# Patient Record
Sex: FEMALE | Race: BLACK OR AFRICAN AMERICAN | Employment: FULL TIME | URBAN - METROPOLITAN AREA
[De-identification: names, ages, dates, MRNs, and addresses within clinical notes are randomized per-mention and may not be internally consistent; named-entity substitution may affect disease eponyms.]

---

## 2018-09-10 ENCOUNTER — OFFICE VISIT (OUTPATIENT)
Dept: FAMILY MEDICINE CLINIC | Age: 63
End: 2018-09-10
Payer: COMMERCIAL

## 2018-09-10 VITALS
HEIGHT: 66 IN | DIASTOLIC BLOOD PRESSURE: 75 MMHG | TEMPERATURE: 97.4 F | HEART RATE: 76 BPM | BODY MASS INDEX: 28.77 KG/M2 | SYSTOLIC BLOOD PRESSURE: 133 MMHG | WEIGHT: 179 LBS

## 2018-09-10 DIAGNOSIS — Z12.31 SCREENING MAMMOGRAM, ENCOUNTER FOR: ICD-10-CM

## 2018-09-10 DIAGNOSIS — Z00.00 HEALTHCARE MAINTENANCE: ICD-10-CM

## 2018-09-10 DIAGNOSIS — M79.671 ACUTE FOOT PAIN, RIGHT: Primary | ICD-10-CM

## 2018-09-10 LAB — HBA1C MFR BLD: 6 %

## 2018-09-10 PROCEDURE — 83036 HEMOGLOBIN GLYCOSYLATED A1C: CPT | Performed by: FAMILY MEDICINE

## 2018-09-10 PROCEDURE — 99213 OFFICE O/P EST LOW 20 MIN: CPT | Performed by: FAMILY MEDICINE

## 2018-09-10 ASSESSMENT — ENCOUNTER SYMPTOMS
CHEST TIGHTNESS: 0
SHORTNESS OF BREATH: 0
ABDOMINAL PAIN: 0
SORE THROAT: 0

## 2018-09-10 ASSESSMENT — PATIENT HEALTH QUESTIONNAIRE - PHQ9
SUM OF ALL RESPONSES TO PHQ9 QUESTIONS 1 & 2: 0
2. FEELING DOWN, DEPRESSED OR HOPELESS: 0
1. LITTLE INTEREST OR PLEASURE IN DOING THINGS: 0
SUM OF ALL RESPONSES TO PHQ QUESTIONS 1-9: 0
SUM OF ALL RESPONSES TO PHQ QUESTIONS 1-9: 0

## 2018-09-10 NOTE — PATIENT INSTRUCTIONS
Thank you for letting us take care of you today. We hope all your questions were addressed. If a question was overlooked or something else comes to mind after you return home, please contact a member of your Care Team listed below. Please make sure you have a routine office visit set up to follow-up on [address]. Your Care Team at [place] [address] is Team #2  Sandra Sanchez DO (Faculty)  Mitzi Wilkins MD (Resident)  Rose Dubois MD (Resident)  Cyn Carlisle MD (Resident)  Herbert Edmonds MD (Resident)  Lg Lyons MD (Resident)  FAITH Coleman., Dony Martino, [address] ([facility])  Sabrina Ricks RN, ([address] )  Mj Bright, Ph.D., (Behavioral Services)  [facility], [address] [place] [address] (Clinical Pharmacist)     Office phone number: [phone]    If you need to get in right away due to illness, please be advised we have \"Same Day\" appointments available Monday-Friday. Please call us at [phone] option #3 to schedule your \"Same Day\" appointment.

## 2018-09-15 ENCOUNTER — HOSPITAL ENCOUNTER (OUTPATIENT)
Dept: MAMMOGRAPHY | Age: 63
Discharge: HOME | End: 2018-09-17
Payer: COMMERCIAL

## 2018-09-15 DIAGNOSIS — Z12.31 SCREENING MAMMOGRAM, ENCOUNTER FOR: ICD-10-CM

## 2018-09-15 DIAGNOSIS — Z00.00 HEALTHCARE MAINTENANCE: ICD-10-CM

## 2018-09-15 PROCEDURE — 77067 SCR MAMMO BI INCL CAD: CPT

## 2018-09-20 ENCOUNTER — TELEPHONE (OUTPATIENT)
Dept: FAMILY MEDICINE CLINIC | Age: 63
End: 2018-09-20

## 2018-09-20 NOTE — TELEPHONE ENCOUNTER
----- Message from Steven Sidhu DO sent at 9/18/2018 11:27 AM EDT -----  Results reviewed are normal.    Please advise patient.

## 2019-01-28 ENCOUNTER — OFFICE VISIT (OUTPATIENT)
Dept: FAMILY MEDICINE CLINIC | Age: 64
End: 2019-01-28
Payer: COMMERCIAL

## 2019-01-28 VITALS
HEART RATE: 83 BPM | WEIGHT: 186.8 LBS | BODY MASS INDEX: 30.02 KG/M2 | TEMPERATURE: 97.4 F | DIASTOLIC BLOOD PRESSURE: 74 MMHG | SYSTOLIC BLOOD PRESSURE: 130 MMHG | HEIGHT: 66 IN

## 2019-01-28 DIAGNOSIS — M75.41 IMPINGEMENT SYNDROME OF RIGHT SHOULDER: Primary | ICD-10-CM

## 2019-01-28 DIAGNOSIS — R07.81 RIB PAIN ON RIGHT SIDE: ICD-10-CM

## 2019-01-28 PROCEDURE — 99213 OFFICE O/P EST LOW 20 MIN: CPT | Performed by: FAMILY MEDICINE

## 2019-01-28 RX ORDER — METHYLPREDNISOLONE 4 MG/1
TABLET ORAL
Qty: 1 KIT | Refills: 0 | Status: SHIPPED | OUTPATIENT
Start: 2019-01-28 | End: 2019-02-03

## 2019-01-28 ASSESSMENT — ENCOUNTER SYMPTOMS
CHEST TIGHTNESS: 0
COUGH: 0
ABDOMINAL PAIN: 0
SHORTNESS OF BREATH: 0

## 2019-01-30 ENCOUNTER — HOSPITAL ENCOUNTER (OUTPATIENT)
Age: 64
Discharge: HOME | End: 2019-02-01
Payer: COMMERCIAL

## 2019-01-30 ENCOUNTER — HOSPITAL ENCOUNTER (OUTPATIENT)
Dept: GENERAL RADIOLOGY | Age: 64
Discharge: HOME | End: 2019-02-01
Payer: COMMERCIAL

## 2019-01-30 DIAGNOSIS — R07.81 RIB PAIN ON RIGHT SIDE: ICD-10-CM

## 2019-01-30 PROCEDURE — 71101 X-RAY EXAM UNILAT RIBS/CHEST: CPT

## 2019-07-05 ENCOUNTER — OFFICE VISIT (OUTPATIENT)
Dept: PRIMARY CARE CLINIC | Age: 64
End: 2019-07-05
Payer: COMMERCIAL

## 2019-07-05 VITALS
DIASTOLIC BLOOD PRESSURE: 72 MMHG | WEIGHT: 187 LBS | SYSTOLIC BLOOD PRESSURE: 127 MMHG | HEART RATE: 67 BPM | TEMPERATURE: 98.2 F | BODY MASS INDEX: 30.05 KG/M2 | RESPIRATION RATE: 20 BRPM | OXYGEN SATURATION: 100 % | HEIGHT: 66 IN

## 2019-07-05 DIAGNOSIS — R23.8 PAPULE OF SKIN: Primary | ICD-10-CM

## 2019-07-05 PROCEDURE — 99202 OFFICE O/P NEW SF 15 MIN: CPT | Performed by: NURSE PRACTITIONER

## 2019-07-05 RX ORDER — AMOXICILLIN AND CLAVULANATE POTASSIUM 875; 125 MG/1; MG/1
1 TABLET, FILM COATED ORAL 2 TIMES DAILY
Qty: 14 TABLET | Refills: 0 | Status: SHIPPED | OUTPATIENT
Start: 2019-07-05 | End: 2019-07-12

## 2019-07-05 RX ORDER — GABAPENTIN 300 MG/1
CAPSULE ORAL
Refills: 0 | COMMUNITY
Start: 2019-05-15 | End: 2022-06-08 | Stop reason: SDUPTHER

## 2019-07-05 ASSESSMENT — ENCOUNTER SYMPTOMS
ABDOMINAL PAIN: 0
SINUS PAIN: 0
VOMITING: 0
COUGH: 0
DIARRHEA: 0
NAUSEA: 0
SORE THROAT: 0
SHORTNESS OF BREATH: 0

## 2019-07-05 ASSESSMENT — PATIENT HEALTH QUESTIONNAIRE - PHQ9
1. LITTLE INTEREST OR PLEASURE IN DOING THINGS: 0
SUM OF ALL RESPONSES TO PHQ QUESTIONS 1-9: 0
SUM OF ALL RESPONSES TO PHQ QUESTIONS 1-9: 0
SUM OF ALL RESPONSES TO PHQ9 QUESTIONS 1 & 2: 0
2. FEELING DOWN, DEPRESSED OR HOPELESS: 0

## 2019-07-16 ENCOUNTER — EMPLOYEE WELLNESS (OUTPATIENT)
Dept: OTHER | Age: 64
End: 2019-07-16

## 2019-07-16 LAB
CHOLESTEROL/HDL RATIO: 2.6
CHOLESTEROL: 206 MG/DL
GLUCOSE BLD-MCNC: 97 MG/DL (ref 70–99)
HDLC SERPL-MCNC: 80 MG/DL
LDL CHOLESTEROL: 111 MG/DL (ref 0–130)
PATIENT FASTING?: YES
TRIGL SERPL-MCNC: 74 MG/DL
VLDLC SERPL CALC-MCNC: ABNORMAL MG/DL (ref 1–30)

## 2019-07-22 VITALS — WEIGHT: 186 LBS | BODY MASS INDEX: 30.04 KG/M2

## 2019-08-08 ENCOUNTER — TELEPHONE (OUTPATIENT)
Dept: FAMILY MEDICINE CLINIC | Age: 64
End: 2019-08-08

## 2019-08-08 ENCOUNTER — OFFICE VISIT (OUTPATIENT)
Dept: PRIMARY CARE CLINIC | Age: 64
End: 2019-08-08
Payer: COMMERCIAL

## 2019-08-08 VITALS
TEMPERATURE: 98.4 F | WEIGHT: 190.4 LBS | RESPIRATION RATE: 20 BRPM | SYSTOLIC BLOOD PRESSURE: 135 MMHG | BODY MASS INDEX: 30.6 KG/M2 | OXYGEN SATURATION: 100 % | DIASTOLIC BLOOD PRESSURE: 73 MMHG | HEART RATE: 76 BPM | HEIGHT: 66 IN

## 2019-08-08 DIAGNOSIS — J01.90 ACUTE NON-RECURRENT SINUSITIS, UNSPECIFIED LOCATION: Primary | ICD-10-CM

## 2019-08-08 PROCEDURE — 99202 OFFICE O/P NEW SF 15 MIN: CPT | Performed by: INTERNAL MEDICINE

## 2019-08-08 RX ORDER — AZITHROMYCIN 250 MG/1
TABLET, FILM COATED ORAL
Qty: 1 PACKET | Refills: 0 | Status: SHIPPED | OUTPATIENT
Start: 2019-08-08 | End: 2020-07-06 | Stop reason: ALTCHOICE

## 2019-08-08 ASSESSMENT — ENCOUNTER SYMPTOMS
COUGH: 1
SORE THROAT: 1

## 2019-08-08 NOTE — PROGRESS NOTES
800 MG tablet Take 1 tablet by mouth every 8 hours as needed for Pain 90 tablet 2    ranitidine (ZANTAC) 300 MG capsule Take 300 mg by mouth every evening.  cetirizine (ZYRTEC) 10 MG tablet Take 10 mg by mouth daily.  Multiple Vitamin (MULTI-VITAMIN DAILY PO) Take  by mouth.  Mometasone Furoate (NASONEX NA) by Nasal route.  albuterol (PROVENTIL HFA;VENTOLIN HFA) 108 (90 BASE) MCG/ACT inhaler Inhale 2 puffs into the lungs every 6 hours as needed for Wheezing.  LUMIGAN 0.01 % SOLN Place 1 drop into both eyes nightly. No current facility-administered medications for this visit. Allergies   Allergen Reactions    Codeine     Eggs Or Egg-Derived Products [Eggs Or Egg-Derived Products] Diarrhea       Health Maintenance   Topic Date Due    Hepatitis C screen  1955    HIV screen  09/25/1970    DTaP/Tdap/Td vaccine (1 - Tdap) 09/25/1974    Shingles Vaccine (1 of 2) 09/25/2005    Cervical cancer screen  09/05/2015    Flu vaccine (1) 09/01/2019    A1C test (Diabetic or Prediabetic)  09/10/2019    Breast cancer screen  09/15/2019    Colon cancer screen colonoscopy  04/08/2023    Lipid screen  07/16/2024    Pneumococcal 0-64 years Vaccine  Aged Out       Controlled Substances Monitoring:      HPI:     Pharyngitis   This is a new problem. The current episode started yesterday. Associated symptoms include coughing (sputum) and a sore throat. Associated symptoms comments: Right ear worse than left. . The symptoms are aggravated by coughing. She has tried nothing for the symptoms. The treatment provided no relief. ROS:     Review of Systems   HENT: Positive for sore throat. Respiratory: Positive for cough (sputum). All other systems reviewed and are negative. Objective:     Physical Exam   Constitutional: She appears well-developed and well-nourished. HENT:   Head: Normocephalic and atraumatic.    Right Ear: External ear normal.   Left Ear: External ear normal. Mouth/Throat: Posterior oropharyngeal edema (PND drainage) present. Neurological: She is alert. Skin: Skin is warm and dry. Psychiatric: She has a normal mood and affect. Her behavior is normal.   Vitals reviewed.

## 2019-08-08 NOTE — PROGRESS NOTES
Visit Information    Have you changed or started any medications since your last visit including any over-the-counter medicines, vitamins, or herbal medicines? no   Have you stopped taking any of your medications? Is so, why? -  no  Are you having any side effects from any of your medications? - no    Have you seen any other physician or provider since your last visit?  no   Have you had any other diagnostic tests since your last visit?  no   Have you been seen in the emergency room and/or had an admission in a hospital since we last saw you?  no   Have you had your routine dental cleaning in the past 6 months?  no     Do you have an active MyChart account? If no, what is the barrier?   Yes    Patient Care Team:  Taylor Durand,  as PCP - [address] [place] [address],  as PCP - [facility] Provider    Medical History Review  Past Medical, Family, and Social History reviewed and does not contribute to the patient presenting condition    Health Maintenance   Topic Date Due    Hepatitis C screen  1955    HIV screen  09/25/1970    DTaP/Tdap/Td vaccine (1 - Tdap) 09/25/1974    Shingles Vaccine (1 of 2) 09/25/2005    Cervical cancer screen  09/05/2015    Flu vaccine (1) 09/01/2019    A1C test (Diabetic or Prediabetic)  09/10/2019    Breast cancer screen  09/15/2019    Colon cancer screen colonoscopy  04/08/2023    Lipid screen  07/16/2024    Pneumococcal 0-64 years Vaccine  Aged Out

## 2020-03-27 ENCOUNTER — OFFICE VISIT (OUTPATIENT)
Dept: FAMILY MEDICINE CLINIC | Age: 65
End: 2020-03-27
Payer: COMMERCIAL

## 2020-03-27 VITALS
DIASTOLIC BLOOD PRESSURE: 77 MMHG | WEIGHT: 186.2 LBS | BODY MASS INDEX: 29.92 KG/M2 | TEMPERATURE: 98.9 F | HEIGHT: 66 IN | HEART RATE: 80 BPM | SYSTOLIC BLOOD PRESSURE: 123 MMHG

## 2020-03-27 PROCEDURE — 99213 OFFICE O/P EST LOW 20 MIN: CPT | Performed by: FAMILY MEDICINE

## 2020-03-27 ASSESSMENT — ENCOUNTER SYMPTOMS
SHORTNESS OF BREATH: 0
SINUS PRESSURE: 1
COUGH: 0
ABDOMINAL PAIN: 0

## 2020-03-27 ASSESSMENT — PATIENT HEALTH QUESTIONNAIRE - PHQ9
SUM OF ALL RESPONSES TO PHQ QUESTIONS 1-9: 0
2. FEELING DOWN, DEPRESSED OR HOPELESS: 0
SUM OF ALL RESPONSES TO PHQ QUESTIONS 1-9: 0
1. LITTLE INTEREST OR PLEASURE IN DOING THINGS: 0
SUM OF ALL RESPONSES TO PHQ9 QUESTIONS 1 & 2: 0

## 2020-03-27 NOTE — PROGRESS NOTES
through 04-. RTW 04-. Rib study - right lower chest wall - patient high fear factor at this time. Isaac 0-2 weeks if not better.         Renay Mcintosh, DO

## 2020-03-27 NOTE — PATIENT INSTRUCTIONS
Thank you for letting us take care of you today. We hope all your questions were addressed. If a question was overlooked or something else comes to mind after you return home, please contact a member of your Care Team listed below. Please make sure you have a routine office visit set up to follow-up on [address]. Your Care Team at [place] [address] is Team #2  Sheryl Urena DO (Faculty)  Kimberly Live MD (Faculty)  Shay Roberto MD (Resident)  Radha Beach MD (Resident)  Paz Roberts MD (Resident)  Nurys Ray MD (Resident)  Brianna Martell, FAITH Grullon ,DARELL GARCIA, [facility] office)  [facility])  Chenchobentley Jitendra ([facility])  Neema Bean, [facility] ([address] [facility])  Simón Man [facility] (Clinical Pharmacist)     Office phone number: [phone]    If you need to get in right away due to illness, please be advised we have \"Same Day\" appointments available Monday-Friday. Please call us at [phone] option #3 to schedule your \"Same Day\" appointment.

## 2020-03-27 NOTE — LETTER
[facility]  [facility] [address]  Phone: [phone]  Fax: [phone]    Amy Rogers        March 27, 2020     Patient: Francoise Liang   YOB: 1955   Date of Visit: 3/27/2020       To Whom It May Concern: It is my medical opinion that Kassandra Braxton should remain out of work until 04-. If you have any questions or concerns, please don't hesitate to call.     Sincerely,        Braxton Gaming, DO

## 2020-03-30 ENCOUNTER — HOSPITAL ENCOUNTER (OUTPATIENT)
Dept: GENERAL RADIOLOGY | Age: 65
Discharge: HOME | End: 2020-04-01
Payer: COMMERCIAL

## 2020-03-30 ENCOUNTER — HOSPITAL ENCOUNTER (OUTPATIENT)
Age: 65
Discharge: HOME | End: 2020-04-01
Payer: COMMERCIAL

## 2020-03-30 PROCEDURE — 71101 X-RAY EXAM UNILAT RIBS/CHEST: CPT

## 2020-03-31 ENCOUNTER — TELEPHONE (OUTPATIENT)
Dept: FAMILY MEDICINE CLINIC | Age: 65
End: 2020-03-31

## 2020-03-31 NOTE — TELEPHONE ENCOUNTER
----- Message from Ramesh Button, DO sent at 3/30/2020  3:44 PM EDT -----  Results reviewed are normal.    Please advise patient.

## 2020-04-06 ENCOUNTER — E-VISIT (OUTPATIENT)
Dept: FAMILY MEDICINE CLINIC | Age: 65
End: 2020-04-06

## 2020-04-06 ENCOUNTER — TELEPHONE (OUTPATIENT)
Dept: FAMILY MEDICINE CLINIC | Age: 65
End: 2020-04-06

## 2020-04-29 ENCOUNTER — TELEPHONE (OUTPATIENT)
Dept: FAMILY MEDICINE CLINIC | Age: 65
End: 2020-04-29

## 2020-05-01 ENCOUNTER — HOSPITAL ENCOUNTER (OUTPATIENT)
Age: 65
Discharge: HOME | End: 2020-05-01
Payer: COMMERCIAL

## 2020-05-01 LAB
BILIRUBIN URINE: NEGATIVE
COLOR: YELLOW
COMMENT UA: NORMAL
GLUCOSE URINE: NEGATIVE
KETONES, URINE: NEGATIVE
LEUKOCYTE ESTERASE, URINE: NEGATIVE
NITRITE, URINE: NEGATIVE
PH UA: 7.5 (ref 5–8)
PROTEIN UA: NEGATIVE
SPECIFIC GRAVITY UA: 1.01 (ref 1–1.03)
TURBIDITY: CLEAR
URINE HGB: NEGATIVE
UROBILINOGEN, URINE: NORMAL

## 2020-05-01 PROCEDURE — 81003 URINALYSIS AUTO W/O SCOPE: CPT

## 2020-05-12 ENCOUNTER — OFFICE VISIT (OUTPATIENT)
Dept: OBGYN CLINIC | Age: 65
End: 2020-05-12
Payer: COMMERCIAL

## 2020-05-12 ENCOUNTER — HOSPITAL ENCOUNTER (OUTPATIENT)
Age: 65
Setting detail: SPECIMEN
Discharge: HOME | End: 2020-05-12
Payer: COMMERCIAL

## 2020-05-12 VITALS
TEMPERATURE: 98.1 F | BODY MASS INDEX: 31.41 KG/M2 | DIASTOLIC BLOOD PRESSURE: 78 MMHG | HEART RATE: 72 BPM | WEIGHT: 184 LBS | HEIGHT: 64 IN | SYSTOLIC BLOOD PRESSURE: 122 MMHG

## 2020-05-12 PROCEDURE — 99386 PREV VISIT NEW AGE 40-64: CPT | Performed by: OBSTETRICS & GYNECOLOGY

## 2020-05-12 PROCEDURE — G0145 SCR C/V CYTO,THINLAYER,RESCR: HCPCS

## 2020-05-12 PROCEDURE — 87624 HPV HI-RISK TYP POOLED RSLT: CPT

## 2020-05-12 NOTE — PROGRESS NOTES
Discharge  Musculoskeletal ROS: No Arthralgia, Arthritis,Gout,Osteoporosis or Rheumatism  Neurological ROS: No CVA, Migraines, Epilepsy, Seizure Hx, or Limb Weakness  Dermatological ROS: No Rash, Itching, Hives, Mole Changes or Cancer                                                                                                                                                                                                                                  PHYSICAL Exam:     Constitutional:  Vitals:    05/12/20 1116   BP: 122/78   Site: Right Upper Arm   Position: Sitting   Cuff Size: Medium Adult   Pulse: 72   Temp: 98.1 °F (36.7 °C)   Weight: 184 lb (83.5 kg)   Height: 5' 4\" (1.626 m)         General Appearance: This  is a well Developed, well Nourished, well groomed female. Her BMI was reviewed. Nutritional decision making was discussed. Skin:  There was a Normal Inspection of the skin without rashes or lesions. There were no rashes. (Papular, Maculopapular, Hives, Pustular, Macular)     There were no lesions (Ulcers, Erythema, Abn. Appearing Nevi)            Lymphatic:  No Lymph Nodes were Palpable in the neck , axilla or groin. # Of Lymph Nodes; Location ; Character [Normal]  [Shotty] [Tender] [Enlarged]     Neck and EENT:  The neck was supple. There were no masses   The thyroid was not enlarged and had no masses. Perrla, EOMI B/L, TMI B/L No Abnormalities. Throat inspected-No exudates or Masses, Nares Patent No Masses        Respiratory: The lungs were auscultated and found to be clear. There were no rales, rhonchi or wheezes. There was a good respiratory effort. Cardiovascular: The heart was in a regular rate and rhythm. . No S3 or S4. There was no murmur appreciated. Location, grade, and radiation are not applicable. Extremities: The patients extremities were without calf tenderness, edema, or varicosities. There was full range of motion in all four extremities.  Pulses in all four extremities were appreciated and are 2/4. Abdomen: The abdomen was soft and non-tender. There were good bowel sounds in all quadrants and there was no guarding, rebound or rigidity. On evaluation there was no evidence of hepatosplenomegaly and there was no costal vertebral shavon tenderness bilaterally. No hernias were appreciated. Abdominal Scars: midline c/w prior surgery    Psych: The patient had a normal Orientation to: Time, Place, Person, and Situation  There is no Mood / Affect changes    Breast:  (Chest)  normal appearance, no masses or tenderness. Tenderness 10 oclock  Self breast exams were reviewed in detail. Literature was given. Pelvic Exam:  Vulva and vagina appear normal. Bimanual exam reveals normal cuff without masses. Non-tender to palpation    Rectal Exam:  exam declined by patient          Musculosk:  Normal Gait and station was noted. Digits were evaluated without abnormal findings. Range of motion, stability and strength were evaluated and found to be appropriate for the patients age. OMM Structural Component:  The patient did not complain of a Chief complaint requiring OMM. Chief Complaint:none    Structural Exam: Agreeable      Persistent right sided pain similar to when she had her mass removed. Right groin tender to deep palpation            ASSESSMENT:      59 y.o. Annual   Diagnosis Orders   1. Encounter for screening mammogram for malignant neoplasm of breast  [facility] DIGITAL SCREEN W CAD BILATERAL   2. Family history of breast cancer in first degree relative  [facility] DIGITAL SCREEN W CAD BILATERAL   3. Pelvic pain in female  US Pelvis Complete   4. Well female exam with routine gynecological exam  PAP SMEAR   5.  Screening for HPV (human papillomavirus)  PAP SMEAR          Chief Complaint   Patient presents with   Adela Alfredo Doctor    Annual Exam          Past Medical History:   Diagnosis Date    Diverticulosis 2009    Diverticulosis     Fibroids     Glaucoma

## 2020-05-14 LAB
HPV SAMPLE: NORMAL
HPV, GENOTYPE 16: NOT DETECTED
HPV, GENOTYPE 18: NOT DETECTED
HPV, HIGH RISK OTHER: NOT DETECTED
HPV, INTERPRETATION: NORMAL
SPECIMEN DESCRIPTION: NORMAL

## 2020-05-15 LAB — CYTOLOGY REPORT: NORMAL

## 2020-05-20 ENCOUNTER — OFFICE VISIT (OUTPATIENT)
Dept: OBGYN CLINIC | Age: 65
End: 2020-05-20
Payer: COMMERCIAL

## 2020-05-20 PROCEDURE — 76830 TRANSVAGINAL US NON-OB: CPT | Performed by: OBSTETRICS & GYNECOLOGY

## 2020-05-28 ENCOUNTER — TELEPHONE (OUTPATIENT)
Dept: FAMILY MEDICINE CLINIC | Age: 65
End: 2020-05-28

## 2020-05-28 NOTE — TELEPHONE ENCOUNTER
Patient called because she was seen in March for side pain. She states at the time she was told if the pain and urine frequency doesn't go away to call back. She is still having the same issues. She also has questions about foot nerve block.

## 2020-06-03 ENCOUNTER — VIRTUAL VISIT (OUTPATIENT)
Dept: FAMILY MEDICINE CLINIC | Age: 65
End: 2020-06-03
Payer: COMMERCIAL

## 2020-06-03 VITALS — WEIGHT: 183 LBS | HEIGHT: 64 IN | BODY MASS INDEX: 31.24 KG/M2

## 2020-06-03 PROCEDURE — 99213 OFFICE O/P EST LOW 20 MIN: CPT | Performed by: FAMILY MEDICINE

## 2020-06-03 ASSESSMENT — PATIENT HEALTH QUESTIONNAIRE - PHQ9
SUM OF ALL RESPONSES TO PHQ QUESTIONS 1-9: 0
SUM OF ALL RESPONSES TO PHQ9 QUESTIONS 1 & 2: 0
1. LITTLE INTEREST OR PLEASURE IN DOING THINGS: 0
SUM OF ALL RESPONSES TO PHQ QUESTIONS 1-9: 0
2. FEELING DOWN, DEPRESSED OR HOPELESS: 0

## 2020-06-09 ENCOUNTER — HOSPITAL ENCOUNTER (OUTPATIENT)
Dept: WOMENS IMAGING | Age: 65
Discharge: HOME | End: 2020-06-11
Payer: COMMERCIAL

## 2020-06-09 PROCEDURE — 77063 BREAST TOMOSYNTHESIS BI: CPT

## 2020-06-11 ENCOUNTER — TELEPHONE (OUTPATIENT)
Dept: FAMILY MEDICINE CLINIC | Age: 65
End: 2020-06-11

## 2020-06-13 ENCOUNTER — APPOINTMENT (OUTPATIENT)
Dept: CT IMAGING | Age: 65
End: 2020-06-13
Payer: COMMERCIAL

## 2020-06-13 ENCOUNTER — HOSPITAL ENCOUNTER (OUTPATIENT)
Dept: CT IMAGING | Age: 65
Discharge: HOME | End: 2020-06-15
Payer: COMMERCIAL

## 2020-06-13 PROCEDURE — 74176 CT ABD & PELVIS W/O CONTRAST: CPT

## 2020-06-15 ENCOUNTER — TELEPHONE (OUTPATIENT)
Dept: OBGYN CLINIC | Age: 65
End: 2020-06-15

## 2020-06-17 ENCOUNTER — TELEPHONE (OUTPATIENT)
Dept: FAMILY MEDICINE CLINIC | Age: 65
End: 2020-06-17

## 2020-06-18 ENCOUNTER — TELEPHONE (OUTPATIENT)
Dept: FAMILY MEDICINE CLINIC | Age: 65
End: 2020-06-18

## 2020-06-18 ENCOUNTER — HOSPITAL ENCOUNTER (EMERGENCY)
Age: 65
Discharge: HOME | End: 2020-06-18
Attending: EMERGENCY MEDICINE
Payer: COMMERCIAL

## 2020-06-18 VITALS
SYSTOLIC BLOOD PRESSURE: 148 MMHG | RESPIRATION RATE: 18 BRPM | OXYGEN SATURATION: 99 % | HEART RATE: 77 BPM | TEMPERATURE: 98.2 F | DIASTOLIC BLOOD PRESSURE: 89 MMHG

## 2020-06-18 PROCEDURE — 6370000000 HC RX 637 (ALT 250 FOR IP): Performed by: STUDENT IN AN ORGANIZED HEALTH CARE EDUCATION/TRAINING PROGRAM

## 2020-06-18 PROCEDURE — 99284 EMERGENCY DEPT VISIT MOD MDM: CPT

## 2020-06-18 PROCEDURE — 93971 EXTREMITY STUDY: CPT

## 2020-06-18 RX ORDER — KETOROLAC TROMETHAMINE 10 MG/1
10 TABLET, FILM COATED ORAL EVERY 6 HOURS PRN
Qty: 20 TABLET | Refills: 0 | Status: SHIPPED | OUTPATIENT
Start: 2020-06-18 | End: 2020-07-06

## 2020-06-18 RX ORDER — IBUPROFEN 400 MG/1
400 TABLET ORAL ONCE
Status: COMPLETED | OUTPATIENT
Start: 2020-06-18 | End: 2020-06-18

## 2020-06-18 RX ADMIN — IBUPROFEN 400 MG: 400 TABLET, FILM COATED ORAL at 18:32

## 2020-06-18 ASSESSMENT — PAIN SCALES - GENERAL
PAINLEVEL_OUTOF10: 5
PAINLEVEL_OUTOF10: 5

## 2020-06-18 ASSESSMENT — PAIN DESCRIPTION - LOCATION: LOCATION: GROIN

## 2020-06-18 ASSESSMENT — ENCOUNTER SYMPTOMS
ABDOMINAL PAIN: 0
VOMITING: 0
COUGH: 0
BACK PAIN: 0
NAUSEA: 0
RHINORRHEA: 0
SHORTNESS OF BREATH: 0

## 2020-06-18 ASSESSMENT — PAIN DESCRIPTION - PAIN TYPE: TYPE: ACUTE PAIN;CHRONIC PAIN

## 2020-06-18 ASSESSMENT — PAIN DESCRIPTION - ORIENTATION: ORIENTATION: RIGHT

## 2020-06-18 NOTE — ED NOTES
Pt resting on stretcher. NAD noted. RR even and nonlabored. Call light within reach. Will continue to monitor.        Devan Stein RN  06/18/20 1925

## 2020-06-18 NOTE — ED PROVIDER NOTES
MEDICATIONS:  Discharge Medication List as of 6/18/2020  8:30 PM      START taking these medications    Details   ketorolac (TORADOL) 10 MG tablet Take 1 tablet by mouth every 6 hours as needed for Pain, Disp-20 tablet, R-0Print             Irvin Finnegan MD  Emergency Medicine Resident    (Please note that portions of this note were completed with a voicerecognition program.  Efforts were made to edit the dictations but occasionally words are mis-transcribed.)        Irvin Finnegan MD  06/19/20 Deb Galaviz MD  06/19/20 0008

## 2020-06-18 NOTE — TELEPHONE ENCOUNTER
Patient called again, wanting to know if she should be seen at ED. Complains of being in so much pain and not being able to hardly walk.

## 2020-06-18 NOTE — TELEPHONE ENCOUNTER
----- Message from Sana Walter DO sent at 6/18/2020  3:01 PM EDT -----  Please call [facility] - advise her that the CT findings don't show a kidney stone or any acute reason for her pain. If she still has symptoms I would like an office visit with her - it is still unclear where the pain is coming from.     Kj Graham, '

## 2020-06-18 NOTE — ED NOTES
Pt sent to ED by PCP for abnormal CT. Pt stated she has calcified blood clot in right leg. Pt stated she has had groin pain on right leg for a few months. Pt reports pain started after having surgery on right foot. Pt stated pain is increased with movement. Pt able to walk on leg but has pain. Pt has pain 5/10 at this time. Pt has take ibuprofen and tylenol for pain. Pt denies any numbness or tingling in leg.           Alfredo Varner RN  06/18/20 2027

## 2020-06-22 ENCOUNTER — OFFICE VISIT (OUTPATIENT)
Dept: FAMILY MEDICINE CLINIC | Age: 65
End: 2020-06-22
Payer: COMMERCIAL

## 2020-06-22 VITALS
BODY MASS INDEX: 30.71 KG/M2 | WEIGHT: 179 LBS | DIASTOLIC BLOOD PRESSURE: 69 MMHG | HEART RATE: 81 BPM | TEMPERATURE: 98.3 F | SYSTOLIC BLOOD PRESSURE: 117 MMHG

## 2020-06-22 PROBLEM — N39.41 URGE INCONTINENCE: Status: ACTIVE | Noted: 2020-06-22

## 2020-06-22 PROBLEM — R35.0 FREQUENCY OF MICTURITION: Status: ACTIVE | Noted: 2020-06-22

## 2020-06-22 PROBLEM — R31.29 MICROHEMATURIA: Status: ACTIVE | Noted: 2020-06-22

## 2020-06-22 PROBLEM — R35.1 NOCTURIA: Status: ACTIVE | Noted: 2020-06-22

## 2020-06-22 PROCEDURE — 99213 OFFICE O/P EST LOW 20 MIN: CPT | Performed by: STUDENT IN AN ORGANIZED HEALTH CARE EDUCATION/TRAINING PROGRAM

## 2020-06-22 ASSESSMENT — PATIENT HEALTH QUESTIONNAIRE - PHQ9
SUM OF ALL RESPONSES TO PHQ9 QUESTIONS 1 & 2: 0
1. LITTLE INTEREST OR PLEASURE IN DOING THINGS: 0
SUM OF ALL RESPONSES TO PHQ QUESTIONS 1-9: 0
2. FEELING DOWN, DEPRESSED OR HOPELESS: 0
SUM OF ALL RESPONSES TO PHQ QUESTIONS 1-9: 0

## 2020-06-22 ASSESSMENT — ENCOUNTER SYMPTOMS
VOMITING: 0
COUGH: 0
DIARRHEA: 0
CHEST TIGHTNESS: 0
SHORTNESS OF BREATH: 0
ABDOMINAL PAIN: 0
CONSTIPATION: 0
SORE THROAT: 0
NAUSEA: 0

## 2020-06-22 NOTE — PROGRESS NOTES
Visit Information    Have you changed or started any medications since your last visit including any over-the-counter medicines, vitamins, or herbal medicines? no   Have you stopped taking any of your medications? Is so, why? -  no  Are you having any side effects from any of your medications? - no    Have you seen any other physician or provider since your last visit?  no   Have you had any other diagnostic tests since your last visit?  no   Have you been seen in the emergency room and/or had an admission in a hospital since we last saw you?  no   Have you had your routine dental cleaning in the past 6 months?  no     Do you have an active MyChart account? If no, what is the barrier?   No:     Patient Care Team:  Bhavya Reynoso DO as PCP - [address] [place] [address],  as PCP - [facility] Provider    Medical History Review  Past Medical, Family, and Social History reviewed and does not contribute to the patient presenting condition    Health Maintenance   Topic Date Due    Hepatitis C screen  1955    HIV screen  09/25/1970    DTaP/Tdap/Td vaccine (1 - Tdap) 09/25/1974    Shingles Vaccine (1 of 2) 09/25/2005    A1C test (Diabetic or Prediabetic)  09/10/2019    Flu vaccine (Season Ended) 09/01/2020    Breast cancer screen  06/09/2021    Colon cancer screen colonoscopy  04/08/2023    Lipid screen  07/16/2024    Hepatitis A vaccine  Aged Out    Hepatitis B vaccine  Aged Out    Hib vaccine  Aged Out    Meningococcal (ACWY) vaccine  Aged Out    Pneumococcal 0-64 years Vaccine  Aged Out

## 2020-06-22 NOTE — PROGRESS NOTES
Subjective:    Meyer Claude is a 59 y.o. female with  has a past medical history of Constipation, Diverticulitis, Diverticulosis, Diverticulosis, Fibroids, GERD (gastroesophageal reflux disease), Glaucoma, Hay fever, Hiatal hernia, Irritable bowel disease, Kidney infection, Lumbar stenosis, and MVP (mitral valve prolapse). Family History   Problem Relation Age of Onset    Breast Cancer Sister     Diabetes Sister     Diabetes Mother     Hypertension Mother     Tuberculosis Mother     Obesity Mother     Heart Disease Father     Arthritis Father     Diabetes Sister     Diabetes Sister     Colon Cancer Maternal Grandmother     Stroke Other     Kidney Disease Other        Presented tothe office today for:  Chief Complaint   Patient presents with    Groin Pain     patient states she is still having pain in her right side down to her groin area       HPI    Patient presents today complaining of right groin pain x1 year. Patient states that she had surgery to her foot and developed right groin pain and later progressing to intense pain requiring CT abdomen which was remarkable for left phlebolith. Patient denies having any shortness of breath or coughing up blood. Review of Systems   Constitutional: Negative for chills, fatigue, fever and unexpected weight change. HENT: Negative for congestion, mouth sores and sore throat. Eyes: Negative for visual disturbance. Respiratory: Negative for cough, chest tightness and shortness of breath. Cardiovascular: Negative for chest pain and leg swelling. Gastrointestinal: Negative for abdominal pain, constipation, diarrhea, nausea and vomiting. Genitourinary: Negative for difficulty urinating. Musculoskeletal: Negative for joint swelling. Right groin pain   Skin: Negative for rash. Neurological: Negative for dizziness, weakness and headaches.        Objective:    /69 (Site: Left Upper Arm, Position: Sitting, Cuff Size: Large

## 2020-06-22 NOTE — PROGRESS NOTES
I have reviewed and discussed key elements of [address] [place] [address] with the resident including plan of care and follow up and agree with the care autumn plan. Patient with groin discomfort. Imaging reveals phlebolith, inguinal. Will refer to vascular. BP elevated. Will recheck at  3 month  follow up.

## 2020-06-24 ENCOUNTER — TELEPHONE (OUTPATIENT)
Dept: FAMILY MEDICINE CLINIC | Age: 65
End: 2020-06-24

## 2020-06-24 NOTE — TELEPHONE ENCOUNTER
Received a call from Vascular in regards to the referral placed for patient. Was told referral needs to be just for vascular not Neuro-Endovascular. On the new referral, could you put the referral to Dr. Emery Anderson @ [facility]. Patient has appointment scheduled there on 08/03/2020, but referral needs fixed.

## 2020-07-01 NOTE — TELEPHONE ENCOUNTER
Please do a referral to vascular for andrew downing.   Electronically signed by Tommie Sanchez MD on 7/1/2020 at 3:45 PM

## 2020-07-16 ENCOUNTER — TELEPHONE (OUTPATIENT)
Dept: FAMILY MEDICINE CLINIC | Age: 65
End: 2020-07-16

## 2020-07-20 ENCOUNTER — TELEPHONE (OUTPATIENT)
Dept: FAMILY MEDICINE CLINIC | Age: 65
End: 2020-07-20

## 2020-07-21 ENCOUNTER — OFFICE VISIT (OUTPATIENT)
Dept: FAMILY MEDICINE CLINIC | Age: 65
End: 2020-07-21
Payer: COMMERCIAL

## 2020-07-21 VITALS
WEIGHT: 172 LBS | SYSTOLIC BLOOD PRESSURE: 132 MMHG | HEART RATE: 76 BPM | HEIGHT: 64 IN | TEMPERATURE: 97 F | BODY MASS INDEX: 29.37 KG/M2 | DIASTOLIC BLOOD PRESSURE: 77 MMHG

## 2020-07-21 PROCEDURE — 99213 OFFICE O/P EST LOW 20 MIN: CPT | Performed by: STUDENT IN AN ORGANIZED HEALTH CARE EDUCATION/TRAINING PROGRAM

## 2020-07-21 RX ORDER — CYCLOBENZAPRINE HCL 10 MG
10 TABLET ORAL NIGHTLY PRN
Qty: 20 TABLET | Refills: 0 | Status: SHIPPED | OUTPATIENT
Start: 2020-07-21 | End: 2020-08-10

## 2020-07-21 RX ORDER — KETOROLAC TROMETHAMINE 10 MG/1
10 TABLET, FILM COATED ORAL EVERY 6 HOURS PRN
Qty: 20 TABLET | Refills: 0 | Status: SHIPPED | OUTPATIENT
Start: 2020-07-21 | End: 2021-02-05

## 2020-07-21 ASSESSMENT — ENCOUNTER SYMPTOMS
SHORTNESS OF BREATH: 0
WHEEZING: 0
CHEST TIGHTNESS: 0
CHOKING: 0
BACK PAIN: 1
GASTROINTESTINAL NEGATIVE: 1

## 2020-07-21 NOTE — PROGRESS NOTES
Visit Information    Have you changed or started any medications since your last visit including any over-the-counter medicines, vitamins, or herbal medicines? no   Have you stopped taking any of your medications? Is so, why? -  no  Are you having any side effects from any of your medications? - no    Have you seen any other physician or provider since your last visit?  no   Have you had any other diagnostic tests since your last visit? yes - [facility]   Have you been seen in the emergency room and/or had an admission in a hospital since we last saw you?  yes - [facility]   Have you had your routine dental cleaning in the past 6 months?  no     Do you have an active MyChart account? If no, what is the barrier?   Yes    Patient Care Team:  Ana García,  as PCP - [address] [place] [address],  as PCP - [facility] Provider    Medical History Review  Past Medical, Family, and Social History reviewed and does not contribute to the patient presenting condition    Health Maintenance   Topic Date Due    Hepatitis C screen  1955    HIV screen  09/25/1970    DTaP/Tdap/Td vaccine (1 - Tdap) 09/25/1974    Shingles Vaccine (1 of 2) 09/25/2005    A1C test (Diabetic or Prediabetic)  09/10/2019    Flu vaccine (1) 09/01/2020    Breast cancer screen  06/09/2021    Colon cancer screen colonoscopy  04/08/2023    Lipid screen  07/16/2024    Hepatitis A vaccine  Aged Out    Hepatitis B vaccine  Aged Out    Hib vaccine  Aged Out    Meningococcal (ACWY) vaccine  Aged Out    Pneumococcal 0-64 years Vaccine  Aged Out

## 2020-07-21 NOTE — PROGRESS NOTES
I have reviewed and discussed key elements of [address] [place] [address] with the resident including plan of care and follow up and agree with the care autumn plan.
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
## 2020-07-21 NOTE — LETTER
[facility]  [address]  [address] [place] [address]  Phone: [phone]  Fax: [phone]    Mt Marte MD        July 21, 2020     Patient: Néstor Soares   YOB: 1955   Date of Visit: 7/21/2020       To Whom it May Concern:    Florencia Fernando was seen in my clinic on 7/21/2020. Please excuse her from work for 2 weeks due to underlying condition. She may return to work on  August 5, 2020. If you have any questions or concerns, please don't hesitate to call.     Sincerely,         Mt Marte MD

## 2020-07-30 ENCOUNTER — OFFICE VISIT (OUTPATIENT)
Dept: FAMILY MEDICINE CLINIC | Age: 65
End: 2020-07-30
Payer: COMMERCIAL

## 2020-07-30 VITALS
TEMPERATURE: 97.5 F | BODY MASS INDEX: 29.53 KG/M2 | SYSTOLIC BLOOD PRESSURE: 128 MMHG | DIASTOLIC BLOOD PRESSURE: 70 MMHG | WEIGHT: 173 LBS | HEIGHT: 64 IN | HEART RATE: 68 BPM

## 2020-07-30 PROCEDURE — 99213 OFFICE O/P EST LOW 20 MIN: CPT | Performed by: FAMILY MEDICINE

## 2020-07-30 PROCEDURE — 99211 OFF/OP EST MAY X REQ PHY/QHP: CPT | Performed by: FAMILY MEDICINE

## 2020-07-30 NOTE — PATIENT INSTRUCTIONS
Thank you for letting us take care of you today. We hope all your questions were addressed. If a question was overlooked or something else comes to mind after you return home, please contact a member of your Care Team listed below. Your Care Team at [place] [address] is Team #2  Adan Navarro DO (Faculty)  Joshua Fitzgerald (Faculty)  Arabella Barreto MD (Resident)  Lam Morrison MD (Resident)  Jitendra Mar MD (Resident)  Adan Zambrano MD (Resident)  Rosio Yeung MD (Resident)  FAITH Panchal. ,MISA GARCIA [facility] ([address] office)  Pat Degroot, [address] [facility] [address] (Clinical Practice Manager)  Timothy Lyle [facility] (Clinical Pharmacist)     Office phone number: [phone]    If you need to get in right away due to illness, please be advised we have \"Same Day\" appointments available Monday-Friday. Please call us at [phone] option #3 to schedule your \"Same Day\" appointment.
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
## 2020-07-30 NOTE — PROGRESS NOTES
Visit Information    Have you changed or started any medications since your last visit including any over-the-counter medicines, vitamins, or herbal medicines? no   Have you stopped taking any of your medications? Is so, why? -  no  Are you having any side effects from any of your medications? - no    Have you seen any other physician or provider since your last visit?  no   Have you had any other diagnostic tests since your last visit?  no   Have you been seen in the emergency room and/or had an admission in a hospital since we last saw you?  no   Have you had your routine dental cleaning in the past 6 months?  no     Do you have an active MyChart account? If no, what is the barrier?   Yes    Patient Care Team:  Nikita Islas, DO as PCP - [address] [place] [address], DO as PCP - [facility] Provider    Medical History Review  Past Medical, Family, and Social History reviewed and does not contribute to the patient presenting condition    Health Maintenance   Topic Date Due    Hepatitis C screen  1955    HIV screen  09/25/1970    DTaP/Tdap/Td vaccine (1 - Tdap) 09/25/1974    Shingles Vaccine (1 of 2) 09/25/2005    A1C test (Diabetic or Prediabetic)  09/10/2019    Flu vaccine (1) 09/01/2020    Breast cancer screen  06/09/2021    Colon cancer screen colonoscopy  04/08/2023    Lipid screen  07/16/2024    Hepatitis A vaccine  Aged Out    Hepatitis B vaccine  Aged Out    Hib vaccine  Aged Out    Meningococcal (ACWY) vaccine  Aged Out    Pneumococcal 0-64 years Vaccine  Aged Out

## 2020-08-03 ENCOUNTER — INITIAL CONSULT (OUTPATIENT)
Dept: VASCULAR SURGERY | Age: 65
End: 2020-08-03
Payer: COMMERCIAL

## 2020-08-03 VITALS
HEART RATE: 69 BPM | BODY MASS INDEX: 29.19 KG/M2 | WEIGHT: 171 LBS | SYSTOLIC BLOOD PRESSURE: 120 MMHG | TEMPERATURE: 98.8 F | HEIGHT: 64 IN | DIASTOLIC BLOOD PRESSURE: 72 MMHG | RESPIRATION RATE: 16 BRPM | OXYGEN SATURATION: 100 %

## 2020-08-03 PROCEDURE — 99244 OFF/OP CNSLTJ NEW/EST MOD 40: CPT | Performed by: SURGERY

## 2020-08-03 NOTE — PROGRESS NOTES
Division of Vascular Surgery        New Consult      Physician Requesting Consult:  Bisi Camacho MD    Reason for Consult:   Calcified gonadal vein    Chief Complaint:      Pelvic/groin pain    History of Present Illness:      Nitesh Benson is a 59 y.o. woman who presents with chronic pelvic/groin pain, worse with ambulation or prolong standing. Pain is sharp and excruciating, to the point that it makes it difficult to walk. She can point directly to the pain in her groin area. Not worsened with sex. Has been going on for about 6 months, the last two months the pain has been quite severe. She denies symptoms suggestive of lower extremity claudication or ischemic rest pain. She does not have any open wounds or sores on her feet. The pain started after she underwent foot and ankle surgery after breaking it when she slipped on black ice in the winter. Since then there was concern for DVT and duplex did not reveal any thrombus. She has undergone several CT scans, and most recent one revealed \"phlebolith\" in her gonadal vein. She denies any prior DVT/PE, but does have a family history of DVT? PE (sister with multiple DVT/PE, uncle  of a fatal PE). She has undergone multiple pelvic surgeries for an ovarian mass over 10 years ago. Had to have a second surgery after her left ovary and fallopian tube was removed due to recurrence on other side which was causing compression symptoms to her colon and her sciatic nerve which lead to intermittent numbness in her right leg.     Medical History:     Past Medical History:   Diagnosis Date    Constipation     Diverticulitis     Diverticulosis 2009    Diverticulosis     Fibroids     GERD (gastroesophageal reflux disease)     Glaucoma 2011    Hay fever     Hiatal hernia     Irritable bowel disease     Kidney infection     Lumbar stenosis     MVP (mitral valve prolapse)        Surgical History:     Past Surgical History:   Procedure Laterality Date    COLONOSCOPY      FOOT SURGERY Left     HYSTERECTOMY      RSO,LS, previously had tubal with LO    LYMPH NODE BIOPSY Right 11/17/2010    axilla    OVARY SURGERY      mass and ovary removed    ABDIEL AND BSO  04/02/2009    Extended abdominal hyst w/rt salpingo-ooph, lt salpingectomy. lysis of extensive small and large intestinal adhesions. right and left ureterolysis. lysis of pelvic adhesions with retroperitoneal exploration. procurement peritoneal cytology.  TONSILLECTOMY AND ADENOIDECTOMY  1973    TUMOR REMOVAL      left foot       Family History:     Family History   Problem Relation Age of Onset    Breast Cancer Sister     Diabetes Sister     Diabetes Mother    Roderick Olp Hypertension Mother     Tuberculosis Mother     Obesity Mother     Heart Disease Father     Arthritis Father     Diabetes Sister     Diabetes Sister     Colon Cancer Maternal Grandmother     Stroke Other     Kidney Disease Other        Allergies:       Cat hair extract; Codeine; and Eggs or egg-derived products [eggs or egg-derived products]    Medications:      Current Outpatient Medications   Medication Sig Dispense Refill    ketorolac (TORADOL) 10 MG tablet Take 1 tablet by mouth every 6 hours as needed for Pain 20 tablet 0    cyclobenzaprine (FLEXERIL) 10 MG tablet Take 1 tablet by mouth nightly as needed for Muscle spasms 20 tablet 0    gabapentin (NEURONTIN) 300 MG capsule TAKE 1 OR 2 CAPSULES BY MOUTH AT BEDTIME  0    EPINEPHrine (EPIPEN 2-ANSON) 0.3 MG/0.3ML SOAJ injection Inject 0.3 mLs into the muscle once for 1 dose Use as directed for allergic reaction 0.3 mL 0    ibuprofen (ADVIL;MOTRIN) 800 MG tablet Take 1 tablet by mouth every 8 hours as needed for Pain 90 tablet 2    ranitidine (ZANTAC) 300 MG capsule Take 300 mg by mouth every evening.  cetirizine (ZYRTEC) 10 MG tablet Take 10 mg by mouth daily.  Multiple Vitamin (MULTI-VITAMIN DAILY PO) Take  by mouth.       Mometasone Furoate (NASONEX NA) by Nasal route.  albuterol (PROVENTIL HFA;VENTOLIN HFA) 108 (90 BASE) MCG/ACT inhaler Inhale 2 puffs into the lungs every 6 hours as needed for Wheezing.  LUMIGAN 0.01 % SOLN Place 1 drop into both eyes nightly.  MYRBETRIQ 50 MG TB24 Take 50 mg by mouth daily (Patient not taking: Reported on 8/3/2020) 30 tablet 11    mirabegron (MYRBETRIQ) 50 MG TB24 Take 50 mg by mouth daily       No current facility-administered medications for this visit. Social History:     Tobacco:    reports that she has never smoked. She has never used smokeless tobacco.  Alcohol:      reports previous alcohol use. Drug Use:  reports no history of drug use. 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 HENT: Negative for congestion. Eyes: Negative for visual disturbance. Respiratory: Negative for chest tightness and shortness of breath. Cardiovascular: Negative for chest pain and leg swelling. Gastrointestinal: Negative for abdominal pain. Endocrine: Negative. Genitourinary: Positive for pelvic pain. Musculoskeletal: Positive for arthralgias. Skin: Negative for color change and wound. Allergic/Immunologic: Negative. Neurological: Negative for facial asymmetry, speech difficulty, weakness and numbness. Hematological: Negative. Psychiatric/Behavioral: Negative. Physical Exam:     Vitals:  /72 (Site: Right Upper Arm, Position: Sitting, Cuff Size: Medium Adult)   Pulse 69   Temp 98.8 °F (37.1 °C) (Temporal)   Resp 16   Ht 5' 4\" (1.626 m)   Wt 171 lb (77.6 kg)   LMP 04/05/2009   SpO2 100%   BMI 29.35 kg/m²     Physical Exam  Constitutional:       Appearance: She is well-developed and well-groomed. Eyes:      Extraocular Movements: Extraocular movements intact.       Conjunctiva/sclera: Conjunctivae normal.   Neck:      Musculoskeletal: Full passive range of motion without pain. Vascular: No carotid bruit. Cardiovascular:      Rate and Rhythm: Normal rate and regular rhythm. Pulses:           Dorsalis pedis pulses are 1+ on the right side and 1+ on the left side. Posterior tibial pulses are 2+ on the right side and 2+ on the left side. Pulmonary:      Effort: Pulmonary effort is normal. No respiratory distress. Abdominal:      Palpations: Abdomen is soft. Tenderness: There is no abdominal tenderness. Hernia: There is no hernia in the right inguinal area. Genitourinary:     Comments: Tenderness over right groin crease over pubic bone. No venous congestion or varicosities noted near her groin  Musculoskeletal:      Right lower leg: She exhibits no tenderness and no swelling. Left lower leg: She exhibits no tenderness and no swelling. Right foot: Normal capillary refill. No tenderness or swelling. Left foot: Normal capillary refill. No tenderness or swelling. Feet:      Right foot:      Skin integrity: No ulcer or skin breakdown. Left foot:      Skin integrity: No ulcer or skin breakdown. Lymphadenopathy:      Lower Body: No right inguinal adenopathy. Skin:     General: Skin is warm. Capillary Refill: Capillary refill takes less than 2 seconds. Neurological:      Mental Status: She is alert and oriented to person, place, and time. GCS: GCS eye subscore is 4. GCS verbal subscore is 5. GCS motor subscore is 6. Sensory: Sensation is intact. Motor: Motor function is intact. Psychiatric:         Mood and Affect: Mood normal.         Speech: Speech normal.         Behavior: Behavior normal.         Thought Content:  Thought content normal.       Imaging/Labs:     \"Phlebolith\" is likely a calcified short segment of her right gonadal vein  No evidence of venous congestion  Similar finding seen on CT from 2016 but not CT from 2014        Assessment and Plan:     Calcified gonadal vein  · Unlikely source of her groin/pelvic pain, since she has had this radiographic finding dating back to 2016  · Also no pelvic venous congestion noted on imaging to suggest pelvic congestion syndrome as the source of her pain  · Calcified short segment of her gonadal vein could be due to her previous pelvic surgeries in which the small vein has developed scarring and ultimately calcification. Does not need any specific treatment for this vein. · Given history of multiple pelvic surgeries with pinpoint pain over her pubis can consider diagnosis of osteitis pubis  · NSAIDs have helped a little bit of her pain which is fist line treatment  · Can consider steroid injection or systemic steroids  · She will reach out to her orthopedic surgeon who has performed previous steroid injections for her hip for bursitis to see if this will help    Electronically signed by Ninfa Simmons MD on 8/3/20 at 2:01 PM EDT      [id number] [facility]  Office: [phone]  Cell: [phone]  Email: [email or web address]

## 2020-08-04 ENCOUNTER — PATIENT MESSAGE (OUTPATIENT)
Dept: FAMILY MEDICINE CLINIC | Age: 65
End: 2020-08-04

## 2020-08-04 ASSESSMENT — ENCOUNTER SYMPTOMS
SHORTNESS OF BREATH: 0
CHEST TIGHTNESS: 0
ABDOMINAL PAIN: 0
ALLERGIC/IMMUNOLOGIC NEGATIVE: 1
COLOR CHANGE: 0

## 2020-08-04 NOTE — TELEPHONE ENCOUNTER
From: Néstor Soares  To: Moses Puntam DO  Sent: 8/4/2020 2:07 PM EDT  Subject: Visit Follow-Up Question    appointment with physical therapy at [facility] location need an order for therapy for lower back and neck please.

## 2020-08-05 ENCOUNTER — VIRTUAL VISIT (OUTPATIENT)
Dept: OBGYN CLINIC | Age: 65
End: 2020-08-05
Payer: COMMERCIAL

## 2020-08-05 PROCEDURE — 99214 OFFICE O/P EST MOD 30 MIN: CPT | Performed by: OBSTETRICS & GYNECOLOGY

## 2020-08-05 NOTE — PROGRESS NOTES
[employer]  2020    Mal Laureano (:  1955) has requested an audio/video evaluation for the following concern(s):    1. Pelvic pain in female          TELEHEALTH EVALUATION -- Audio/Visual (During LFVCV-63 public health emergency)      Mal Laureano is a 59 y.o. female       She was here to follow-up regarding her labs and diagnostics ordered at her last visit for the diagnosis of:    ICD-10-CM    1. Pelvic pain in female  R10.2        She has any specific chief complaint today. Her bowels are regular and she is voiding without difficulty. Pt states her pain is still present in same place her groin area on right side worse with ambulation and changing positions. Pt was seen by vascular surgeon for possible clot workup negative. Pt is also seeing urologist. Pt had a sono/ CT scan which for negative for gynecologic pathology. Pt has had multiple surgeries and had extensive adhesions as documented in op report by Dr. Jennifer Fabian for pelvic mass. Pts pathology report was benign. Pt uterus/ cervix / bilateral tubes and ovaries removed. Pt states pain similar to prior to her surgery for pelvic mass on right. Pain most likely related to scar tissue. Recommended to pt to follow up with general surgeon for colonoscopy as well as evaluation for possible need for surgery for adhesive disease or further imaging studies.        Past Medical History:   Diagnosis Date    Constipation     Diverticulitis     Diverticulosis 2009    Diverticulosis     Fibroids     GERD (gastroesophageal reflux disease)     Glaucoma     Hay fever     Hiatal hernia     Irritable bowel disease     Kidney infection     Lumbar stenosis     MVP (mitral valve prolapse)          Past Surgical History:   Procedure Laterality Date    COLONOSCOPY      FOOT SURGERY Left     HYSTERECTOMY      RSO,LS, previously had tubal with LO    LYMPH NODE BIOPSY Right 2010    axilla    OVARY SURGERY      mass and ovary removed    ABDIEL AND BSO  04/02/2009    Extended abdominal hyst w/rt salpingo-ooph, lt salpingectomy. lysis of extensive small and large intestinal adhesions. right and left ureterolysis. lysis of pelvic adhesions with retroperitoneal exploration. procurement peritoneal cytology.  TONSILLECTOMY AND ADENOIDECTOMY  1973    TUMOR REMOVAL      left foot         Family History   Problem Relation Age of Onset    Breast Cancer Sister     Diabetes Sister     Diabetes Mother     Hypertension Mother     Tuberculosis Mother     Obesity Mother     Heart Disease Father     Arthritis Father     Diabetes Sister     Diabetes Sister     Colon Cancer Maternal Grandmother     Stroke Other     Kidney Disease Other          Social History     Tobacco Use    Smoking status: Never Smoker    Smokeless tobacco: Never Used   Substance Use Topics    Alcohol use: Not Currently     Comment: socially    Drug use: No         MEDICATIONS:  Current Outpatient Medications   Medication Sig Dispense Refill    ketorolac (TORADOL) 10 MG tablet Take 1 tablet by mouth every 6 hours as needed for Pain 20 tablet 0    cyclobenzaprine (FLEXERIL) 10 MG tablet Take 1 tablet by mouth nightly as needed for Muscle spasms 20 tablet 0    MYRBETRIQ 50 MG TB24 Take 50 mg by mouth daily (Patient not taking: Reported on 8/3/2020) 30 tablet 11    mirabegron (MYRBETRIQ) 50 MG TB24 Take 50 mg by mouth daily      gabapentin (NEURONTIN) 300 MG capsule TAKE 1 OR 2 CAPSULES BY MOUTH AT BEDTIME  0    EPINEPHrine (EPIPEN 2-ANSON) 0.3 MG/0.3ML SOAJ injection Inject 0.3 mLs into the muscle once for 1 dose Use as directed for allergic reaction 0.3 mL 0    ibuprofen (ADVIL;MOTRIN) 800 MG tablet Take 1 tablet by mouth every 8 hours as needed for Pain 90 tablet 2    ranitidine (ZANTAC) 300 MG capsule Take 300 mg by mouth every evening.  cetirizine (ZYRTEC) 10 MG tablet Take 10 mg by mouth daily.       Multiple Vitamin (MULTI-VITAMIN DAILY PO) Take  by mouth.      Mometasone Furoate (NASONEX NA) by Nasal route.  albuterol (PROVENTIL HFA;VENTOLIN HFA) 108 (90 BASE) MCG/ACT inhaler Inhale 2 puffs into the lungs every 6 hours as needed for Wheezing.  LUMIGAN 0.01 % SOLN Place 1 drop into both eyes nightly. No current facility-administered medications for this visit. ALLERGIES:  Allergies as of 08/05/2020 - Review Complete 08/05/2020   Allergen Reaction Noted    Cat hair extract Anaphylaxis and Swelling 08/03/2020    Codeine  05/09/2014    Eggs or egg-derived products [eggs or egg-derived products] Diarrhea 01/10/2014         Last menstrual period 04/05/2009, not currently breastfeeding. PE is limited due to the virtual visit    Abdomen: Soft non-tender; good bowel sounds. No guarding, rebound or rigidity. No CVA tenderness bilaterally reported when questioned. (Viewed Virtually)    Extremities: No calf tenderness, DTR 2/4, and No edema bilaterally as inspected by video and palpation by the patient (Viewed Virtually)    Pelvic: (Virtual Visit-Not Completed)    Diagnostics:  No results found. Lab Results:  Results for orders placed or performed in visit on 07/06/20   POCT Urinalysis No Micro (Auto)   Result Value Ref Range    Color, UA yellow     Clarity, UA clear     Glucose, UA POC negative     Bilirubin, UA      Ketones, UA      Spec Grav, UA      Blood, UA POC trace-lysed     pH, UA      Protein, UA POC negative     Urobilinogen, UA      Leukocytes, UA negative     Nitrite, UA negative            Assessment:   Diagnosis Orders   1.  Pelvic pain in female       Chief Complaint   Patient presents with    Follow-up         Patient Active Problem List    Diagnosis Date Noted    Frequency of micturition 06/22/2020    Nocturia 06/22/2020    Microhematuria 06/22/2020    Urge incontinence 06/22/2020    Lumbar stenosis     Diverticulosis     Fibroids     MVP (mitral valve prolapse)     Acute bronchitis 01/10/2014    Allergic

## 2020-08-07 ENCOUNTER — HOSPITAL ENCOUNTER (OUTPATIENT)
Dept: PHYSICAL THERAPY | Facility: CLINIC | Age: 65
Setting detail: THERAPIES SERIES
Discharge: HOME | End: 2020-08-07
Payer: COMMERCIAL

## 2020-08-07 PROCEDURE — 97161 PT EVAL LOW COMPLEX 20 MIN: CPT

## 2020-08-07 PROCEDURE — 97140 MANUAL THERAPY 1/> REGIONS: CPT

## 2020-08-07 PROCEDURE — 97110 THERAPEUTIC EXERCISES: CPT
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
## 2020-08-10 ENCOUNTER — HOSPITAL ENCOUNTER (OUTPATIENT)
Dept: PHYSICAL THERAPY | Facility: CLINIC | Age: 65
Setting detail: THERAPIES SERIES
Discharge: HOME | End: 2020-08-10
Payer: COMMERCIAL

## 2020-08-10 PROCEDURE — 97113 AQUATIC THERAPY/EXERCISES: CPT

## 2020-08-10 NOTE — FLOWSHEET NOTE
[] [facility]       Physical Therapy M[facility]       [place] [address]        [address]       Phone: [phone]       Fax: [phone] [] [facility]       Promotion at [address] [facility]       Phone: [phone]       Fax: [phone] [x] Janeen. [address] [place] [address] Health Promotion  [address] [facility]   Phone: [phone]   Fax:  [phone]     Physical Therapy Daily  Aquatic Treatment Note    Date:  8/10/2020  Patient Name:  Nitesh Benson    :  1955  MRN: [id number]  Physician: Dr. Tripathi Loss: Alondra Farnsworth (unlimited visit)  Medical Diagnosis: Cervical and Lumbar pain, arm contusion                   Rehab Codes: M54.2, M54.5, S40.029A  Onset Date: 20               Next 's appt.: TBA    Visit# / total visits:   Cancels/No Shows: 0    Subjective:    Pain:  [x] Yes  [] No Location: LB    Pain Rating: (0-10 scale) 7/10  Pain altered Tx:  [x] No  [] Yes  Action:  Comments: Pt mentioned that her pain in middle of her LB, also standing for a while makes her pain radiate up her back and she needs to sit when that happens.      Objective:     KEY  B = Belt G = Gloves N = Noodle   C = Cuffs K = Kickboard P = Paddles   CC = Cervical Collar L = Laps T = Theratube   DB = Dumbells M = Minutes W = Weights     Exercises/Activities/  Warm-up/Amb 8/10  Dynamic Exercises 8/10    Forward 3L  March     Sideways 3L  Squat     Backwards 3L  Retro HS curls        Retro SLR     Stretches   Braiding     Gastroc/Soleus   Heel to Toe amb     Hamstring   Toe amb     Hip flexor   Heel amb     Piriformis        SKTC        Pec Stretch        Post Deltoid   Static Exercises UE        Shoulder flex/ext     Static Exercises LE   Shoulder abd/add     Heel/toe raises 15  Shoulder H.  abd/add     Marches 15  Shoulder IR/ER     Mini-squats 15  Rowing     4-way hip  15  Arm Circles     Hamstring curls 15  UT shrugs/rolls     Hip Circles/Fig 8   Scap squeezes     Ankle ROM   Diagonals 1/2     Lunges    Elbow flex/ext        Pron/Sup     Functional Exercise   Wrist AROM     Step        Wall Push-ups   Deep H20/     SLS   Bike 2m    Breast Stroke on Noodle   Hip abd/add 2m    Noodle Twist   Hip flex/ext     Noodle Push down   Hip IR/ER     Kickboard push/pull   Knee flex/ext        Push/pull on [facility] 5m    Other:    Specific Instructions for next treatment:    Treatment Charges: Mins Units   []  Modalities     []  Ther Exercise     []  Manual Therapy     []  Ther Activities     [x]  Aquatics 30 2   []  Other       Assessment: [x] Progressing toward goals. [] No change. [x] Other: Initiated aquatics today with focus on decreasing pain and increasing strengthen and core stability. Pt having low tolerance to movement so began with simple movements focusing in a pain free range. STG: (to be met in 10 treatments)  1. ? Pain: Pt to have decreased low back pain to 4/10  2. ? ROM:Pt to have improved lumbar extension to only 25% impaired  3. ? Strength: Pt to have BLE strength globally to 5/5  4. ? Function: Pt to report increased ability to walk without an increase in pain   5. Patient to be independent with home exercise program as demonstrated by performance with correct form without cues.     LTG: (to be met in 20 treatments)  1. Pt to have decreased low back pain to 1-2/10  2. Pt to have full painfree AROM lumbar spine        Patient goals: To have no back pain     Pt. Education:  [x] Yes  [] No  [x] Reviewed Prior HEP/Ed  Method of Education: [x] Verbal  [] Demo  [] Written  Comprehension of Education:  [x] Verbalizes understanding. [] Demonstrates understanding. [] Needs review. [] Demonstrates/verbalizes HEP/Ed previously given. Plan: [x] Continue per plan of care.    [] Other:      Time In:8:30am            Time Out: 9:04am    Electronically signed by:  John Amin PTA

## 2020-08-14 ENCOUNTER — TELEPHONE (OUTPATIENT)
Dept: FAMILY MEDICINE CLINIC | Age: 65
End: 2020-08-14

## 2020-08-14 ENCOUNTER — HOSPITAL ENCOUNTER (OUTPATIENT)
Dept: PHYSICAL THERAPY | Facility: CLINIC | Age: 65
Setting detail: THERAPIES SERIES
Discharge: HOME | End: 2020-08-14
Payer: COMMERCIAL

## 2020-08-14 PROCEDURE — 97113 AQUATIC THERAPY/EXERCISES: CPT

## 2020-08-14 RX ORDER — METHOCARBAMOL 500 MG/1
500 TABLET, FILM COATED ORAL 4 TIMES DAILY
Qty: 40 TABLET | Refills: 0 | Status: SHIPPED | OUTPATIENT
Start: 2020-08-14 | End: 2020-10-30 | Stop reason: SDUPTHER
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
## 2020-08-17 ENCOUNTER — HOSPITAL ENCOUNTER (OUTPATIENT)
Dept: PHYSICAL THERAPY | Facility: CLINIC | Age: 65
Setting detail: THERAPIES SERIES
Discharge: HOME | End: 2020-08-17
Payer: COMMERCIAL

## 2020-08-17 PROCEDURE — 97113 AQUATIC THERAPY/EXERCISES: CPT

## 2020-08-17 NOTE — FLOWSHEET NOTE
[] PR[facility] Out[facility]       Physical Therapy MOB2       [place] [address]        [address]       Phone: [phone]       Fax: [phone] [] [facility]       Promotion at [address] [facility]       Phone: [phone]       Fax: [phone] [x] Janeen. [facility] Promotion  [address] [facility]   Phone: [phone]   Fax:  [phone]     Physical Therapy Daily  Aquatic Treatment Note    Date:  2020  Patient Name:  Nazia Hernandez    :  1955  MRN: [id number]  Physician: Dr. Guadalupe Kori: Jeet Romo (unlimited visit)  Medical Diagnosis: Cervical and Lumbar pain, arm contusion                   Rehab Codes: M54.2, M54.5, S40.029A  Onset Date: 20               Next 's appt.: TBA    Visit# / total visits:   Cancels/No Shows: 0    Subjective:    Pain:  [x] Yes  [] No Location: LB    Pain Rating: (0-10 scale) 8/10  Pain altered Tx:  [x] No  [] Yes  Action:  Comments: Pt mentioned having a lot of pain over the weekend with house hold chores. Pt stated that her doctor has ordered her some more pain meds.        Objective:     KEY  B = Belt G = Gloves N = Noodle   C = Cuffs K = Kickboard P = Paddles   CC = Cervical Collar L = Laps T = Theratube   DB = Dumbells M = Minutes W = Weights     Exercises/Activities/  Warm-up/Amb  Dynamic Exercises    Forward 3L 3L March     Sideways 3L 3L Squat     Backwards 3L 3L Retro HS curls        Retro SLR     Stretches   Braiding     Gastroc/Soleus   Heel to Toe amb     Hamstring   Toe amb     Hip flexor   Heel amb     Piriformis        SKTC        Pec Stretch        Post Deltoid   Static Exercises UE        Shoulder flex/ext     Static Exercises LE   Shoulder abd/add     Heel/toe raises 10 10 Shoulder H.  abd/add     Marches 10 10 Shoulder IR/ER     Mini-squats 10 10 Rowing     4-way hip  10 10 Arm Circles Hamstring curls 10 10 UT shrugs/rolls     Hip Circles/Fig 8   Scap squeezes     Ankle ROM   Diagonals 1/2     Lunges    Elbow flex/ext        Pron/Sup     Functional Exercise   Wrist AROM     Step        Wall Push-ups   Deep H20/     SLS   Bike Held     Breast Stroke on Noodle   Hip abd/add Held     Noodle Twist   Hip flex/ext     Noodle Push down   Hip IR/ER     Kickboard push/pull   Knee flex/ext        Push/pull on [employer] 5m   Other:    Specific Instructions for next treatment:    Treatment Charges: Mins Units   []  Modalities     []  Ther Exercise     []  Manual Therapy     []  Ther Activities     [x]  Aquatics 30 2   []  Other       Assessment: [x] Progressing toward goals. [] No change. [x] Other: Pt continues to have increased symptoms with movement. Unable to progress d/t increased symptoms. Focused on slow and controlled movements along with making sure pt is keeping core mm tight with proper posturing. STG: (to be met in 10 treatments)  1. ? Pain: Pt to have decreased low back pain to 4/10  2. ? ROM:Pt to have improved lumbar extension to only 25% impaired  3. ? Strength: Pt to have BLE strength globally to 5/5  4. ? Function: Pt to report increased ability to walk without an increase in pain   5. Patient to be independent with home exercise program as demonstrated by performance with correct form without cues.     LTG: (to be met in 20 treatments)  1. Pt to have decreased low back pain to 1-2/10  2. Pt to have full painfree AROM lumbar spine        Patient goals: To have no back pain     Pt. Education:  [x] Yes  [] No  [x] Reviewed Prior HEP/Ed  Method of Education: [x] Verbal  [] Demo  [] Written  Comprehension of Education:  [x] Verbalizes understanding. [] Demonstrates understanding. [] Needs review. [] Demonstrates/verbalizes HEP/Ed previously given. Plan: [x] Continue per plan of care.    [] Other:      Time In:8:30am            Time Out: 9:23am    Electronically signed by:  Reji Boyle, PTA

## 2020-08-19 ENCOUNTER — HOSPITAL ENCOUNTER (OUTPATIENT)
Dept: PHYSICAL THERAPY | Facility: CLINIC | Age: 65
Setting detail: THERAPIES SERIES
Discharge: HOME | End: 2020-08-19
Payer: COMMERCIAL

## 2020-08-19 PROCEDURE — 97113 AQUATIC THERAPY/EXERCISES: CPT

## 2020-08-19 NOTE — FLOWSHEET NOTE
[] [facility]       Physical Therapy MOB2       [facility] [address]        [address]       Phone: [phone]       Fax: [phone] [] [facility]       Promotion at [address] [facility]       Phone: [phone]       Fax: [phone] [x] Janeen. [facility]5 [facility] Promotion  [address]   Phone: [phone]   Fax:  [phone]     Physical Therapy Daily  Aquatic Treatment Note    Date:  2020  Patient Name:  Naun Lyons    :  1955  MRN: [id number]  Physician: Dr. Dawson Stage: Devan Goldstein (unlimited visit)  Medical Diagnosis: Cervical and Lumbar pain, arm contusion                   Rehab Codes: M54.2, M54.5, S40.029A  Onset Date: 20               Next 's appt.: TBA    Visit# / total visits:   Cancels/No Shows: 0    Subjective:    Pain:  [x] Yes  [] No Location: LB    Pain Rating: (0-10 scale) 6/10  Pain altered Tx:  [x] No  [] Yes  Action:  Comments: Pt mentioned that she is feeling a little better this morning but a little achy.       Objective:     KEY  B = Belt G = Gloves N = Noodle   C = Cuffs K = Kickboard P = Paddles   CC = Cervical Collar L = Laps T = Theratube   DB = Dumbells M = Minutes W = Weights     Exercises/Activities/  Warm-up/Amb  Dynamic Exercises    Forward 3L 4L March     Sideways 3L 4L Squat     Backwards 3L 4L Retro HS curls        Retro SLR     Stretches   Braiding     Gastroc/Soleus   Heel to Toe amb     Hamstring   Toe amb     Hip flexor   Heel amb     Piriformis        SKTC        Pec Stretch        Post Deltoid   Static Exercises UE        Shoulder flex/ext     Static Exercises LE   Shoulder abd/add     Heel/toe raises 10 10 Shoulder H.  abd/add     Marches 10 10 Shoulder IR/ER     Mini-squats 10 10 Rowing     4-way hip  10 10 Arm Circles     Hamstring curls 10 10 UT shrugs/rolls     Hip Circles/Fig 8

## 2020-08-21 ENCOUNTER — APPOINTMENT (OUTPATIENT)
Dept: PHYSICAL THERAPY | Facility: CLINIC | Age: 65
End: 2020-08-21
Payer: COMMERCIAL

## 2020-08-24 ENCOUNTER — HOSPITAL ENCOUNTER (OUTPATIENT)
Dept: PHYSICAL THERAPY | Facility: CLINIC | Age: 65
Setting detail: THERAPIES SERIES
Discharge: HOME | End: 2020-08-24
Payer: COMMERCIAL

## 2020-08-24 PROCEDURE — 97140 MANUAL THERAPY 1/> REGIONS: CPT

## 2020-08-24 PROCEDURE — 97113 AQUATIC THERAPY/EXERCISES: CPT

## 2020-08-24 PROCEDURE — 97110 THERAPEUTIC EXERCISES: CPT

## 2020-08-24 NOTE — FLOWSHEET NOTE
[] [facility] Outpt       Physical Therapy MOB2       [place] [address]        [address]       Phone: [phone]       Fax: [phone] [] [facility]       Promotion at [address] [facility]       Phone: [phone]       Fax: [phone] [x] Janeen. [address] [facility] Promotion  [address] [facility]   Phone: [phone]   Fax:  [phone]     Physical Therapy Daily  Aquatic Treatment Note    Date:  2020  Patient Name:  Freedom Potter    :  1955  MRN: [id number]  Physician: Dr. Shelley Rosas: Danyelle Lubin (unlimited visit)  Medical Diagnosis: Cervical and Lumbar pain, arm contusion                   Rehab Codes: M54.2, M54.5, S40.029A  Onset Date: 20               Next 's appt.: TBA    Visit# / total visits:   Cancels/No Shows: 0    Subjective:    Pain:  [x] Yes  [] No Location: LB    Pain Rating: (0-10 scale) 6/10  Pain altered Tx:  [x] No  [] Yes  Action:  Comments: Pt mentioned that she is late today d/t falling back asleep after her alarm went off.      Objective:     KEY  B = Belt G = Gloves N = Noodle   C = Cuffs K = Kickboard P = Paddles   CC = Cervical Collar L = Laps T = Theratube   DB = Dumbells M = Minutes W = Weights     Exercises/Activities/  Warm-up/Amb  Dynamic Exercises    Forward 4L 4L March  4L   Sideways 4L 4L Squat     Backwards 4L 4L Retro HS curls        Retro SLR     Stretches   Braiding     Gastroc/Soleus   Heel to Toe amb     Hamstring   Toe amb     Hip flexor   Heel amb     Piriformis        SKTC        Pec Stretch        Post Deltoid   Static Exercises UE        Shoulder flex/ext     Static Exercises LE   Shoulder abd/add     Heel/toe raises 10   Shoulder H.  abd/add     Marches 10  Shoulder IR/ER     Mini-squats 10  Rowing     4-way hip  10  Arm Circles     Hamstring curls 10  UT shrugs/rolls     Hip Circles/Fig 8 Scap squeezes     Ankle ROM   Diagonals 1/2     Lunges    Elbow flex/ext        Pron/Sup     Functional Exercise   Wrist AROM     Step        Wall Push-ups   Deep H20/     SLS   Bike 3m    Breast Stroke on Noodle   Hip abd/add     Noodle Twist   Hip flex/ext     Noodle Push down   Hip IR/ER     Kickboard push/pull   Knee flex/ext        Push/pull on [facility] 5m    Other:    Specific Instructions for next treatment:    Treatment Charges: Mins Units   []  Modalities     []  Ther Exercise     []  Manual Therapy     []  Ther Activities     [x]  Aquatics 15 1   []  Other       Assessment: [x] Progressing toward goals. [] No change. [x] Other: Pt unable to complete full program today d/t availability on land for re-evaluation of cervical area. Pt is going to perform land therapy to address upper cervical and lower back. STG: (to be met in 10 treatments)  1. ? Pain: Pt to have decreased low back pain to 4/10  2. ? ROM:Pt to have improved lumbar extension to only 25% impaired  3. ? Strength: Pt to have BLE strength globally to 5/5  4. ? Function: Pt to report increased ability to walk without an increase in pain   5. Patient to be independent with home exercise program as demonstrated by performance with correct form without cues.     LTG: (to be met in 20 treatments)  1. Pt to have decreased low back pain to 1-2/10  2. Pt to have full painfree AROM lumbar spine        Patient goals: To have no back pain     Pt. Education:  [x] Yes  [] No  [x] Reviewed Prior HEP/Ed  Method of Education: [x] Verbal  [] Demo  [] Written  Comprehension of Education:  [x] Verbalizes understanding. [] Demonstrates understanding. [] Needs review. [] Demonstrates/verbalizes HEP/Ed previously given. Plan: [x] Continue per plan of care.    [] Other:      Time In:8:48am            Time Out: 9:11am    Electronically signed by:  Tammi Mays PTA

## 2020-08-24 NOTE — FLOWSHEET NOTE
[] [facility]) - [facility] &  Therapy  [address]  [phone]  F: [phone] [] [address]  [place] [address]   [address]  P: [phone]  F: [phone] [] [address] &  Therapy  [address] [facility]  P: [phone]  F: [phone] [] [address]  [facility]   [address]   [place]: [phone]  F: [phone]      Physical Therapy Daily Treatment Note/ Progress Note    Date:  2020  Patient Name:  Romero Hood    :  1955  MRN: [id number]  Physician: Dr. Radha Rios: Medical [place] (unlimited visit)  Medical Diagnosis: Cervical and Lumbar pain, arm contusion                   Rehab Codes: M54.2, M54.5, S40.029A  Onset Date: 20               Next 's appt.: TBA     Visit# / total visits:                     Cancels/No Shows: 0    Cancels/No Shows: 0/0    Subjective:    Pain:  [] Yes  [] No Location: Low back L sided cervical pain Pain Rating: (0-10 scale) 5/10  Pain altered Tx:  [] No  [] Yes  Action:  Comments: Pt arrives from pool appt d/t having continued neck pain that pt would like addressed today. Pt states that low back pain is \"slightly\" improved, however would like to transition to full land therapy to better address back pain and to begin addressing neck pain.      Objective:    Exercises:  Exercise Reps/ Time Weight/ Level Comments   SCIFIT   next         4 way hip   next   Seated lumbar flexion ball stretch   Next    Supine piriformis stretch   Next          Seated UT stretch 3x30\"     Seated Leavtor stretch 3x30\"     Cervical AROM x           Other: Manual: MFR to L UT and Levator supine with bolster (attempt in R side lying d/t supine causing an increase in low back pain)  Hypervolt to L UT- attempt next visit   Somatic Dysfunction: HEP/Ed  Method of Education: [x] Verbal  [x] Demo  [x] Written  Comprehension of Education:  [x] Verbalizes understanding. [x] Demonstrates understanding. [x] Needs review. [] Demonstrates/verbalizes HEP/Ed previously given. Plan: [x] Continue current frequency toward long and short term goals.           Time In:0910          Time Out: 0945    Electronically signed by:  Román Aburto PT

## 2020-08-26 ENCOUNTER — HOSPITAL ENCOUNTER (OUTPATIENT)
Dept: PHYSICAL THERAPY | Facility: CLINIC | Age: 65
Setting detail: THERAPIES SERIES
Discharge: HOME | End: 2020-08-26
Payer: COMMERCIAL

## 2020-08-26 PROCEDURE — 97140 MANUAL THERAPY 1/> REGIONS: CPT

## 2020-08-26 PROCEDURE — 97110 THERAPEUTIC EXERCISES: CPT

## 2020-08-26 NOTE — FLOWSHEET NOTE
10 nc   [x]  Ther Exercise 20 1   [x]  Manual Therapy 15 1   []  Ther Activities     []  Aquatics     []  Vasocompression     []  Other     Total Treatment time 40 2       Assessment: [x] Progressing toward goals. [] No change. [x] Other: Pt with fair tolerance to ex completed per chart above. Increased time needed for progression through treatment. Cueing with stretching ex to keep in comfortable range. Pt noting increased LB soreness with prolonged time in supine position. Exteremly tender throughout L sided cervical musculature and down into thoracic paraspinals with pt only able to tolerate very light pressure with MFR. Provided pt with MHP to decrease pain symptoms with fair pt response. [x] Patient would continue to benefit from skilled physical therapy services in order to: Patient would benefit from skilled physical therapy services in order to: Decrease low back pain, increase BLE strength and increase low back ROM, increase cervical ROM. STG: (to be met in 10 treatments)  1. ? Pain: Pt to have decreased low back pain to 4/10  2. ? ROM:Pt to have improved lumbar extension to only 25% impaired  3. ? Strength: Pt to have BLE strength globally to 5/5  4. ? Function: Pt to report increased ability to walk without an increase in pain   5. Patient to be independent with home exercise program as demonstrated by performance with correct form without cues.     LTG: (to be met in 20 treatments)  1. Pt to have decreased low back pain to 1-2/10  2. Pt to have full painfree AROM lumbar spine  3. Pt to have full painfree AROM cervical spine (added 8/24)        Patient goals: To have no back pain     Pt. Education:  [x] Yes  [] No  [] Reviewed Prior HEP/Ed  Method of Education: [x] Verbal  [x] Demo  [x] Written  Comprehension of Education:  [x] Verbalizes understanding. [x] Demonstrates understanding. [x] Needs review. [] Demonstrates/verbalizes HEP/Ed previously given.      Plan: [x] Continue current frequency toward long and short term goals.           Time In: 4:15pm          Time Out: 5:00pm    Electronically signed by:  Rocky Matute PTA

## 2020-08-28 ENCOUNTER — APPOINTMENT (OUTPATIENT)
Dept: PHYSICAL THERAPY | Facility: CLINIC | Age: 65
End: 2020-08-28
Payer: COMMERCIAL

## 2020-08-31 ENCOUNTER — APPOINTMENT (OUTPATIENT)
Dept: PHYSICAL THERAPY | Facility: CLINIC | Age: 65
End: 2020-08-31
Payer: COMMERCIAL

## 2020-09-02 ENCOUNTER — HOSPITAL ENCOUNTER (OUTPATIENT)
Dept: PHYSICAL THERAPY | Facility: CLINIC | Age: 65
Setting detail: THERAPIES SERIES
Discharge: HOME | End: 2020-09-02
Payer: COMMERCIAL

## 2020-09-02 PROCEDURE — 97140 MANUAL THERAPY 1/> REGIONS: CPT

## 2020-09-02 PROCEDURE — 97110 THERAPEUTIC EXERCISES: CPT

## 2020-09-02 NOTE — FLOWSHEET NOTE
treatments: Resume aquatic therapy for low back 1x/wk, continue with land therapy for neck 1x/wk. Continue scapular retraining on land for postural stabilization. Treatment Charges: Mins Units   []  Modalities  nc   [x]  Ther Exercise 20 1   [x]  Manual Therapy 15 1   []  Ther Activities     []  Aquatics     []  Vasocompression     []  Other     Total Treatment time 35 2       Assessment: [x] Progressing toward goals. Pt with significant improvement in neck pain s/p manual and stretch. Will began to add in addtional scapular retraining for postural support at next visit. Pt states that land therapy has been helping with neck pain, however would like to return to the pool for low back. Plan is now one day of land therapy and one day of aquatic therapy per week. [] No change. [] Other:   [x] Patient would continue to benefit from skilled physical therapy services in order to: Patient would benefit from skilled physical therapy services in order to: Decrease low back pain, increase BLE strength and increase low back ROM, increase cervical ROM. STG: (to be met in 10 treatments)  1. ? Pain: Pt to have decreased low back pain to 4/10  2. ? ROM:Pt to have improved lumbar extension to only 25% impaired  3. ? Strength: Pt to have BLE strength globally to 5/5  4. ? Function: Pt to report increased ability to walk without an increase in pain   5. Patient to be independent with home exercise program as demonstrated by performance with correct form without cues.     LTG: (to be met in 20 treatments)  1. Pt to have decreased low back pain to 1-2/10  2. Pt to have full painfree AROM lumbar spine  3. Pt to have full painfree AROM cervical spine (added 8/24)        Patient goals: To have no back pain     Pt. Education:  [x] Yes  [] No  [] Reviewed Prior HEP/Ed  Method of Education: [x] Verbal  [x] Demo  [x] Written  Comprehension of Education:  [x] Verbalizes understanding.   [x] Demonstrates understanding. [x] Needs review. [] Demonstrates/verbalizes HEP/Ed previously given. Plan: [x] Continue current frequency toward long and short term goals.           Time In: 1005          Time Out: 1045    Electronically signed by:  Gillian Tijerina PT

## 2020-09-04 ENCOUNTER — HOSPITAL ENCOUNTER (OUTPATIENT)
Dept: PHYSICAL THERAPY | Facility: CLINIC | Age: 65
Setting detail: THERAPIES SERIES
Discharge: HOME | End: 2020-09-04
Payer: COMMERCIAL

## 2020-09-04 PROCEDURE — 97113 AQUATIC THERAPY/EXERCISES: CPT

## 2020-09-04 NOTE — FLOWSHEET NOTE
[] [facility] Out[facility]       Physical Therapy MOB2       [facility] [address]        [address]       Phone: [phone]       Fax: [phone] [] [facility]       Promotion at [address]       Phone: [phone]       Fax: [phone] [x] Janeen. [address] [place] [address] Health Promotion  [address] [place] [address]   Phone: [phone]   Fax:  [phone]     Physical Therapy Daily  Aquatic Treatment Note    Date:  2020  Patient Name:  Edgar Ray    :  1955  MRN: [id number]  Physician: Dr. Marbin Correa: Marichuy Meeks (unlimited visit)  Medical Diagnosis: Cervical and Lumbar pain, arm contusion                   Rehab Codes: M54.2, M54.5, S40.029A  Onset Date: 20               Next 's appt.: TBA    Visit# / total visits:   Cancels/No Shows: 0    Subjective:    Pain:  [x] Yes  [] No Location: LB    Pain Rating: (0-10 scale) 7/10  Pain altered Tx:  [x] No  [] Yes  Action:  Comments: Pt reports pain narda LB today and R forearm. Pt arrives 15 min late and states she is used to coming in the morning so left late.     Objective:     KEY  B = Belt G = Gloves N = Noodle   C = Cuffs K = Kickboard P = Paddles   CC = Cervical Collar L = Laps T = Theratube   DB = Dumbells M = Minutes W = Weights     Exercises/Activities/  Warm-up/Amb  9/4 Dynamic Exercises    Forward 4L 4L 4L March  4L 4L   Sideways 4L 4L 4L Squat      Backwards 4L 4L 4L Retro HS curls          Retro SLR      Stretches    Braiding      Gastroc/Soleus    Heel to Toe amb      Hamstring    Toe amb      Hip flexor    Heel amb      Piriformis          SKTC          Pec Stretch          Post Deltoid    Static Exercises UE          Shoulder flex/ext      Static Exercises LE    Shoulder abd/add      Heel/toe raises 10   10 Shoulder H.  abd/add      Marches 10  10 Shoulder IR/ER      Mini-squats 10  10 Rowing

## 2020-09-09 ENCOUNTER — HOSPITAL ENCOUNTER (OUTPATIENT)
Dept: PHYSICAL THERAPY | Facility: CLINIC | Age: 65
Setting detail: THERAPIES SERIES
Discharge: HOME | End: 2020-09-09
Payer: COMMERCIAL

## 2020-09-09 PROCEDURE — 97110 THERAPEUTIC EXERCISES: CPT

## 2020-09-09 NOTE — FLOWSHEET NOTE
[] [facility]  [facility] &  Therapy  [address]  [phone]  F: [phone] [] [address]  [facility]   [address]  P: [phone]  F: [phone] [] [address] &  Therapy  [address]  P: [phone]  F: [phone] [] [address]  [facility]   [address]   [place]: [phone]  F: [phone]      Physical Therapy Daily Treatment Note    Date:  2020  Patient Name:  Aníbal Luz    :  1955  MRN: [id number]  Physician: Dr. Alice Muñoz: Medical [place] (unlimited visit)  Medical Diagnosis: Cervical and Lumbar pain, arm contusion                   Rehab Codes: M54.2, M54.5, S40.029A  Onset Date: 20               Next 's appt.: TBA     Visit# / total visits:                     Cancels/No Shows: 0    Cancels/No Shows: 0/0    Subjective:    Pain:  [x] Yes  [] No Location: Low back L sided cervical pain Pain Rating: (0-10 scale) 7/10- low back, 7/10- neck  Pain altered Tx:  [x] No  [] Yes  Action:  Comments: Pt states she is very achy today both LB and neck. Pt was sore after use of Hypervolt last land session and requests not to have that again.     Objective:    Exercises:  MHP: large,cervical - 15' start of session  Exercise Reps/ Time Weight/ Level Comments   SCIFIT 6' L1.0                Seated lumbar flexion ball stretch 10x3\"  Not today   LTR 10x5     Supine piriformis stretch 3x10\"           Seated UT stretch 3x30\"     Seated Leavtor stretch 3x30\"     Cervical AROM x     Doorway pec stretch 3x30\"     Seated scapular retraction/post sh rolls 2x10 each     Standing scap depression 5\"x10     Other:   Manual: MFR and hypervolt to bilat UT and Levator, thoracic paraspinals - 10' pt in sitting/Held 20             Specific Verbalizes understanding. [x] Demonstrates understanding. [x] Needs review. [] Demonstrates/verbalizes HEP/Ed previously given. Plan: [x] Continue current frequency toward long and short term goals.           Time In: 3:10pm          Time Out: 4:10pm    Electronically signed by:  Mendy Garcia PTA

## 2020-09-14 ENCOUNTER — HOSPITAL ENCOUNTER (OUTPATIENT)
Dept: PHYSICAL THERAPY | Facility: CLINIC | Age: 65
Setting detail: THERAPIES SERIES
Discharge: HOME | End: 2020-09-14
Payer: COMMERCIAL

## 2020-09-14 ENCOUNTER — TELEPHONE (OUTPATIENT)
Dept: FAMILY MEDICINE CLINIC | Age: 65
End: 2020-09-14

## 2020-09-14 PROCEDURE — 97113 AQUATIC THERAPY/EXERCISES: CPT

## 2020-09-14 NOTE — FLOWSHEET NOTE
[] VI [facility]       Physical Therapy MOB2       [address]        [address]       Phone: [phone]       Fax: [phone] [] [facility]       Promotion at [address] [facility]       Phone: [phone]       Fax: [phone] [x] Janeen. [facility] Health Promotion  [address]   Phone: [phone]   Fax:  [phone]     Physical Therapy Daily  Aquatic Treatment Note    Date:  2020  Patient Name:  Aníbal Luz    :  1955  MRN: [id number]  Physician: Dr. Sullivan Party: Maik Purvis (unlimited visit)  Medical Diagnosis: Cervical and Lumbar pain, arm contusion                   Rehab Codes: M54.2, M54.5, S40.029A  Onset Date: 20               Next 's appt.: 2020    Visit# / total visits:   Cancels/No Shows: 0    Subjective:    Pain:  [x] Yes  [] No Location: LB    Pain Rating: (0-10 scale) 6/10  Pain altered Tx:  [x] No  [] Yes  Action:  Comments: Pt mentioned that she is feeling a little better but still having pain.      Objective:     KEY  B = Belt G = Gloves N = Noodle   C = Cuffs K = Kickboard P = Paddles   CC = Cervical Collar L = Laps T = Theratube   DB = Dumbells M = Minutes W = Weights     Exercises/Activities/  Warm-up/Amb  9 Dynamic Exercises    Forward 4L 7L March  7L   Sideways 4L 7L Squat     Backwards 4L 7L Retro HS curls        Retro SLR     Stretches   Braiding     Gastroc/Soleus   Heel to Toe amb     Hamstring   Toe amb     Hip flexor   Heel amb     Piriformis        SKTC        Pec Stretch        Post Deltoid   Static Exercises UE        Shoulder flex/ext     Static Exercises LE   Shoulder abd/add     Heel/toe raises 10  14 Shoulder H.  abd/add     Marches 10 10 Shoulder IR/ER     Mini-squats 10 10 Rowing     4-way hip  10 10 Arm Circles     Hamstring curls 10 10 UT shrugs/rolls     Hip Circles/Fig 8 Scap squeezes     Ankle ROM   Diagonals 1/2     Lunges    Elbow flex/ext        Pron/Sup     Functional Exercise   Wrist AROM     Step        Wall Push-ups   Deep H20/     SLS   Bike 3m 3m   Breast Stroke on Noodle   Hip abd/add     Noodle Twist   Hip flex/ext     Noodle Push down   Hip IR/ER     Kickboard push/pull   Knee flex/ext        Push/pull on [employer] 5m   Other:    Specific Instructions for next treatment:    Treatment Charges: Mins Units   []  Modalities     []  Ther Exercise     []  Manual Therapy     []  Ther Activities     [x]  Aquatics 30 2   []  Other       Assessment: [x] Progressing toward goals. [] No change. [x] Other: Able to increase pt dynamic laps today d/t low symptoms with laps. Pt able to increase heel raises to 14 reps today but began increased symptoms as she moved on in program. Able to perform 10 reps of all static exercises and complete deep water exercises. STG: (to be met in 10 treatments)  1. ? Pain: Pt to have decreased low back pain to 4/10  2. ? ROM:Pt to have improved lumbar extension to only 25% impaired  3. ? Strength: Pt to have BLE strength globally to 5/5  4. ? Function: Pt to report increased ability to walk without an increase in pain   5. Patient to be independent with home exercise program as demonstrated by performance with correct form without cues.     LTG: (to be met in 20 treatments)  1. Pt to have decreased low back pain to 1-2/10  2. Pt to have full painfree AROM lumbar spine        Patient goals: To have no back pain     Pt. Education:  [x] Yes  [] No  [x] Reviewed Prior HEP/Ed  Method of Education: [x] Verbal  [] Demo  [] Written  Comprehension of Education:  [x] Verbalizes understanding. [] Demonstrates understanding. [] Needs review. [] Demonstrates/verbalizes HEP/Ed previously given. Plan: [x] Continue per plan of care. [] Other:      Time In: 10:30am            Time Out: 11:30am    Electronically signed by:  Nayla Hardy.

## 2020-09-16 ENCOUNTER — HOSPITAL ENCOUNTER (OUTPATIENT)
Dept: PHYSICAL THERAPY | Facility: CLINIC | Age: 65
Setting detail: THERAPIES SERIES
Discharge: HOME | End: 2020-09-16
Payer: COMMERCIAL

## 2020-09-16 PROCEDURE — 97110 THERAPEUTIC EXERCISES: CPT

## 2020-09-16 NOTE — FLOWSHEET NOTE
[] [address]  [address]  [phone]  F: [phone] [] [address]  [facility]   [address]  P: [phone]  F: [phone] [x] [address]  Therapy  [address]  P: [phone]  F: [phone] [] [address]  [facility]   [address]   [employer]: [phone]  F: [phone]      Physical Therapy Daily Treatment Note    Date:  2020  Patient Name:  Bony Callahan    :  1955  MRN: [id number]  Physician: Dr. Dawit Muñoz: Medical [place] (unlimited visit)  Medical Diagnosis: Cervical and Lumbar pain, arm contusion                   Rehab Codes: M54.2, M54.5, S40.029A  Onset Date: 20               Next 's appt.: 20     Visit# / total visits: (corrected visit number)                  Cancels/No Shows: 0    Cancels/No Shows: 0/0    Subjective:    Pain:  [x] Yes  [] No Location: Low back L sided cervical pain Pain Rating: (0-10 scale) 7/10- low back, 7/10- neck  Pain altered Tx:  [x] No  [] Yes  Action:  Comments: Pt states she is very achy today both LB and neck. Pt feels she is a bit better than when she first started PT, however cont  with pain and difficulty sleeping and getting comfortable. Pt also with difficulty with ADLs.    Objective:    Exercises:  MHP: large,cervical - 15' start of session  Exercise Reps/ Time Weight/ Level Comments   SCIFIT/Nu step 6' L1.0                Seated lumbar flexion ball stretch 10x3\"  Not today   LTR 10x5     Supine piriformis stretch 3x10\"           Seated UT stretch 3x30\"     Seated Leavtor stretch 3x30\"     Cervical AROM x     Doorway pec stretch 3x30\"     Seated scapular retraction/post sh rolls 2x10 each     Standing scap depression 5\"x10     Other:   Manual: MFR and hypervolt to bilat UT and Levator, thoracic paraspinals - 10' pt in sitting/Held 9/16/20             Specific Instructions for subsequent treatments: Resume aquatic therapy for low back 1x/wk, continue with land therapy for neck 1x/wk. Continue scapular retraining on land for postural stabilization. Treatment Charges: Mins Units   [x]  Modalities HP 15 nc   [x]  Ther Exercise 30 2   []  Manual Therapy     []  Ther Activities     []  Aquatics     []  Vasocompression     []  Other     Total Treatment time 45 2       Assessment: [x] Progressing toward goals. Initiated tx with HP d/t pt pain followed by Nu step and stretching. Pt notes soreness narda L UT with stretching but can feel muscles loosening up. VC for proper tech and cont to keep shoulders down during ex. Pt to see her dr tomorrow for follow up due to cont pain. Will cont to monitor sx.     [] No change. [] Other:   [x] Patient would continue to benefit from skilled physical therapy services in order to: Patient would benefit from skilled physical therapy services in order to: Decrease low back pain, increase BLE strength and increase low back ROM, increase cervical ROM. STG: (to be met in 10 treatments)  1. ? Pain: Pt to have decreased low back pain to 4/10  2. ? ROM:Pt to have improved lumbar extension to only 25% impaired  3. ? Strength: Pt to have BLE strength globally to 5/5  4. ? Function: Pt to report increased ability to walk without an increase in pain   5. Patient to be independent with home exercise program as demonstrated by performance with correct form without cues.     LTG: (to be met in 20 treatments)  1. Pt to have decreased low back pain to 1-2/10  2. Pt to have full painfree AROM lumbar spine  3. Pt to have full painfree AROM cervical spine (added 8/24)        Patient goals: To have no back pain     Pt.  Education:  [x] Yes  [] No  [] Reviewed Prior HEP/Ed  Method of Education: [x] Verbal  [x] Demo  [x] Written  Comprehension of Education:  [x] Verbalizes understanding. [x] Demonstrates understanding. [x] Needs review. [] Demonstrates/verbalizes HEP/Ed previously given. Plan: [x] Continue current frequency toward long and short term goals.           Time In: 10:05am          Time Out: 11:05 am    Electronically signed by:  Samantha Rodríguez PTA

## 2020-09-17 ENCOUNTER — OFFICE VISIT (OUTPATIENT)
Dept: FAMILY MEDICINE CLINIC | Age: 65
End: 2020-09-17
Payer: COMMERCIAL

## 2020-09-17 VITALS
TEMPERATURE: 97.3 F | DIASTOLIC BLOOD PRESSURE: 71 MMHG | BODY MASS INDEX: 28.92 KG/M2 | WEIGHT: 169.4 LBS | SYSTOLIC BLOOD PRESSURE: 122 MMHG | HEIGHT: 64 IN | HEART RATE: 64 BPM

## 2020-09-17 LAB — HBA1C MFR BLD: 6.1 %

## 2020-09-17 PROCEDURE — 99213 OFFICE O/P EST LOW 20 MIN: CPT | Performed by: FAMILY MEDICINE

## 2020-09-17 PROCEDURE — 99211 OFF/OP EST MAY X REQ PHY/QHP: CPT | Performed by: FAMILY MEDICINE

## 2020-09-17 PROCEDURE — 83036 HEMOGLOBIN GLYCOSYLATED A1C: CPT | Performed by: FAMILY MEDICINE

## 2020-09-17 NOTE — LETTER
[address]  [address]  [address] [place] [address]  Phone: [phone]  Fax: [phone]    Viktor Starr DO        September 17, 2020      Please accept this as a time-off extension from work (unable to return to regular work) from today through 12-. Reason - medical condition - non-work related.       Sincerely,        Viktor Starr DO

## 2020-09-17 NOTE — PROGRESS NOTES
Subjective:      Patient ID: Tiara Nichols is a 59 y.o. female. HPI     Off work leave - discussed extension of time off through end of year (12-). Lower back  Neck and shoulders  No N/T to hands or feet  Sleep quality - terrible  Mood - up and down  prolonged standing a real burden. Showers - help  Heating pad - helps    Therapy - one weekly  Aquatics (therapy) - one wekly      Review of Systems    Negative for:    Headache  Dizziness  Visual Disturbance  Hearing Changes  Nasal / sinus Symptoms  Mouth / tooth symptom, pain  Throat pain  Difficulty swallowing  Chest discomfort  Cough  SOB  N/V/D/C  Pelvic area discomfort  Bladder / voiding discomfort  Bowel complaints    Numbness/tingling/abnormal sensations   Edema / Leg swelling  Dizziness  Fatigue  Bleeding   Skin    Pertinent Pos: See HPI  MS complaints   Neck pain    Anxious, mood fluctuations    Objective:   Physical Exam     Diffuse posterior thorax regional spasm  Reduced ROM  Guaarding    HRRR  LCTAB  Anxious, irritable      Assessment:       Diagnosis Orders   1. Muscle spasm of back   - not resolved   2. Cervical spine pain  - not resolved   3. Lumbar spine pain  - not resolved   4. Prediabetes  POCT glycosylated hemoglobin (Hb A1C)           Plan:      MARINA - 4 w        Proposed extended FMLA Leave today through - 12-    Continue physio-therapy - plan as previous  Patient interest in DC care - agree with soft tissue Tx plans    FMLA forms need update and extension - patient states that HR said change the end date on the previously submitted form.     Do Scales, DO

## 2020-09-21 ENCOUNTER — HOSPITAL ENCOUNTER (OUTPATIENT)
Dept: PHYSICAL THERAPY | Facility: CLINIC | Age: 65
Setting detail: THERAPIES SERIES
Discharge: HOME | End: 2020-09-21
Payer: COMMERCIAL

## 2020-09-21 PROCEDURE — 97113 AQUATIC THERAPY/EXERCISES: CPT

## 2020-09-21 NOTE — FLOWSHEET NOTE
[] [facility]       Physical Therapy MOB2       [address]        [address]       Phone: [phone]       Fax: [phone] [] [facility]       Promotion at [address] [facility]       Phone: [phone]       Fax: [phone] [x] Janeen. [facility]5 [facility] Health Promotion  [address] [facility]   Phone: [phone]   Fax:  [phone]     Physical Therapy Daily  Aquatic Treatment Note    Date:  2020  Patient Name:  Dontrell Virgen    :  1955  MRN: [id number]  Physician: Dr. Tierney Place: [address] [place] [address] (unlimited visit)  Medical Diagnosis: Cervical and Lumbar pain, arm contusion                   Rehab Codes: M54.2, M54.5, S40.029A  Onset Date: 20               Next 's appt.: 10/28/2020    Visit# / total visits:   Cancels/No Shows: 0    Subjective:    Pain:  [x] Yes  [] No Location: LB    Pain Rating: (0-10 scale) 6/10  Pain altered Tx:  [x] No  [] Yes  Action:  Comments: Pt mentioned that her doctor wants her to see a chiropractor, massage therapist, and to continue PT.      Objective:     KEY  B = Belt G = Gloves N = Noodle   C = Cuffs K = Kickboard P = Paddles   CC = Cervical Collar L = Laps T = Theratube   DB = Dumbells M = Minutes W = Weights     Exercises/Activities/  Warm-up/Amb  Dynamic Exercises    Forward 4L 4L March 4L 4L   Sideways 4L 4L Squat     Backwards 4L 4L Retro HS curls        Retro SLR     Stretches   Braiding     Gastroc/Soleus   Heel to Toe amb     Hamstring   Toe amb     Hip flexor   Heel amb     Piriformis        SKTC        Pec Stretch        Post Deltoid   Static Exercises UE        Shoulder flex/ext     Static Exercises LE   Shoulder abd/add     Heel/toe raises 14 15 Shoulder H.  abd/add     Marches 10 15 Shoulder IR/ER     Mini-squats 10 15 Rowing     4-way hip  10 15 Arm Circles     Hamstring curls 10 15 UT shrugs/rolls     Hip Circles/Fig 8   Scap squeezes     Ankle ROM   Diagonals 1/2     Lunges    Elbow flex/ext        Pron/Sup     Functional Exercise   Wrist AROM     Step        Wall Push-ups   Deep H20/     SLS   Bike 3m 3m   Breast Stroke on Noodle   Hip abd/add     Noodle Twist   Hip flex/ext     Noodle Push down   Hip IR/ER     Kickboard push/pull   Knee flex/ext        Push/pull on [employer] 5m   Other:    Specific Instructions for next treatment:    Treatment Charges: Mins Units   []  Modalities     []  Ther Exercise     []  Manual Therapy     []  Ther Activities     [x]  Aquatics 30 2   []  Other       Assessment: [x] Progressing toward goals. [] No change. [x] Other: Able to increase static exercises today to x15. Pt started to experience symptoms towards end of session but able to complete all. Finished in deep water and scheduled for next week. STG: (to be met in 10 treatments)  1. ? Pain: Pt to have decreased low back pain to 4/10  2. ? ROM:Pt to have improved lumbar extension to only 25% impaired  3. ? Strength: Pt to have BLE strength globally to 5/5  4. ? Function: Pt to report increased ability to walk without an increase in pain   5. Patient to be independent with home exercise program as demonstrated by performance with correct form without cues.     LTG: (to be met in 20 treatments)  1. Pt to have decreased low back pain to 1-2/10  2. Pt to have full painfree AROM lumbar spine        Patient goals: To have no back pain     Pt. Education:  [x] Yes  [] No  [x] Reviewed Prior HEP/Ed  Method of Education: [x] Verbal  [] Demo  [] Written  Comprehension of Education:  [x] Verbalizes understanding. [] Demonstrates understanding. [] Needs review. [] Demonstrates/verbalizes HEP/Ed previously given. Plan: [x] Continue per plan of care.    [] Other:      Time In: 10:00am            Time Out: 10:50am    Electronically signed by:  Herson Wells PTA

## 2020-09-23 ENCOUNTER — HOSPITAL ENCOUNTER (OUTPATIENT)
Dept: PHYSICAL THERAPY | Facility: CLINIC | Age: 65
Setting detail: THERAPIES SERIES
Discharge: HOME | End: 2020-09-23
Payer: COMMERCIAL

## 2020-09-23 PROCEDURE — 97140 MANUAL THERAPY 1/> REGIONS: CPT

## 2020-09-23 PROCEDURE — 97110 THERAPEUTIC EXERCISES: CPT

## 2020-09-28 ENCOUNTER — HOSPITAL ENCOUNTER (OUTPATIENT)
Dept: PHYSICAL THERAPY | Facility: CLINIC | Age: 65
Setting detail: THERAPIES SERIES
Discharge: HOME | End: 2020-09-28
Payer: COMMERCIAL

## 2020-09-28 PROCEDURE — 97113 AQUATIC THERAPY/EXERCISES: CPT

## 2020-09-28 NOTE — FLOWSHEET NOTE
[] [facility]       Physical Therapy MOB2       [address]        [address]       Phone: [phone]       Fax: [phone] [] [facility]       Promotion at [address] [facility]       Phone: [phone]       Fax: [phone] [x] Joselito [address] [place] [address] Health Promotion  [address] [facility]   Phone: [phone]   Fax:  [phone]     Physical Therapy Daily  Aquatic Treatment Note    Date:  2020  Patient Name:  Néstor Soares    :  1955  MRN: [id number]  Physician: Dr. Justin Brandt: Colleen Carson (unlimited visit)  Medical Diagnosis: Cervical and Lumbar pain, arm contusion                   Rehab Codes: M54.2, M54.5, S40.029A  Onset Date: 20               Next 's appt.: 10/28/2020    Visit# / total visits: 15/20  Cancels/No Shows: 0    Subjective:    Pain:  [x] Yes  [] No Location: LB    Pain Rating: (0-10 scale) 3-4/10  Pain altered Tx:  [x] No  [] Yes  Action:  Comments: Pt stated that today is the best day that she has had in a long time.    Objective:     KEY  B = Belt G = Gloves N = Noodle   C = Cuffs K = Kickboard P = Paddles   CC = Cervical Collar L = Laps T = Theratube   DB = Dumbells M = Minutes W = Weights     Exercises/Activities/  Warm-up/Amb  Dynamic Exercises    Forward 4L 4L March 4L 4L   Sideways 4L 4L Squat     Backwards 4L 4L Retro HS curls  4L      Retro SLR     Stretches   Braiding     Gastroc/Soleus   Heel to Toe amb     Hamstring   Toe amb     Hip flexor   Heel amb     Piriformis        SKTC        Pec Stretch        Post Deltoid   Static Exercises UE        Shoulder flex/ext     Static Exercises LE   Shoulder abd/add     Heel/toe raises 15 15 Shoulder H.  abd/add     Marches 15 15 Shoulder IR/ER     Mini-squats 15 15 Rowing     4-way hip  15 15 Arm Circles     Hamstring curls 15 15 UT shrugs/rolls     Hip Circles/Fig 8 Scap squeezes     Ankle ROM   Diagonals 1/2     Lunges    Elbow flex/ext        Pron/Sup     Functional Exercise   Wrist AROM     Step        Wall Push-ups   Deep H20/     SLS   Bike 3m 3m   Breast Stroke on Noodle   Hip abd/add     Noodle Twist   Hip flex/ext     Noodle Push down   Hip IR/ER     Kickboard push/pull   Knee flex/ext        Push/pull on [employer] 5m   Other:    Specific Instructions for next treatment:    Treatment Charges: Mins Units   []  Modalities     []  Ther Exercise     []  Manual Therapy     []  Ther Activities     [x]  Aquatics 30 2   []  Other       Assessment: [x] Progressing toward goals. [] No change. [x] Other: Continued with exercises per log with the addition of dynamic HS curls. Pt requiring Max verbal cueing for exercise recall and mod demo for exercise technique. Unable to add any more progressions in d/t time period of performance of POC. Will continue to monitor symptoms and add to program as possible. STG: (to be met in 10 treatments)  1. ? Pain: Pt to have decreased low back pain to 4/10  2. ? ROM:Pt to have improved lumbar extension to only 25% impaired  3. ? Strength: Pt to have BLE strength globally to 5/5  4. ? Function: Pt to report increased ability to walk without an increase in pain   5. Patient to be independent with home exercise program as demonstrated by performance with correct form without cues.     LTG: (to be met in 20 treatments)  1. Pt to have decreased low back pain to 1-2/10  2. Pt to have full painfree AROM lumbar spine        Patient goals: To have no back pain     Pt. Education:  [x] Yes  [] No  [x] Reviewed Prior HEP/Ed  Method of Education: [x] Verbal  [] Demo  [] Written  Comprehension of Education:  [x] Verbalizes understanding. [] Demonstrates understanding. [] Needs review. [] Demonstrates/verbalizes HEP/Ed previously given. Plan: [x] Continue per plan of care.    [] Other:      Time In: 8:30am            Time Out: 9:28am    Electronically signed by:  Arsalan Mock PTA

## 2020-09-30 ENCOUNTER — TELEPHONE (OUTPATIENT)
Dept: FAMILY MEDICINE CLINIC | Age: 65
End: 2020-09-30

## 2020-09-30 ENCOUNTER — HOSPITAL ENCOUNTER (OUTPATIENT)
Dept: PHYSICAL THERAPY | Facility: CLINIC | Age: 65
Setting detail: THERAPIES SERIES
Discharge: HOME | End: 2020-09-30
Payer: COMMERCIAL

## 2020-09-30 ENCOUNTER — HOSPITAL ENCOUNTER (OUTPATIENT)
Dept: MRI IMAGING | Age: 65
Discharge: HOME | End: 2020-10-02
Payer: COMMERCIAL

## 2020-09-30 ENCOUNTER — HOSPITAL ENCOUNTER (OUTPATIENT)
Dept: ULTRASOUND IMAGING | Age: 65
Discharge: HOME | End: 2020-10-02
Payer: COMMERCIAL

## 2020-09-30 LAB
BUN BLDV-MCNC: 18 MG/DL (ref 8–23)
CREAT SERPL-MCNC: 0.75 MG/DL (ref 0.5–0.9)
GFR AFRICAN AMERICAN: >60 ML/MIN
GFR NON-AFRICAN AMERICAN: >60 ML/MIN
GFR SERPL CREATININE-BSD FRML MDRD: NORMAL ML/MIN/{1.73_M2}
GFR SERPL CREATININE-BSD FRML MDRD: NORMAL ML/MIN/{1.73_M2}

## 2020-09-30 PROCEDURE — 36415 COLL VENOUS BLD VENIPUNCTURE: CPT

## 2020-09-30 PROCEDURE — A9579 GAD-BASE MR CONTRAST NOS,1ML: HCPCS | Performed by: SURGERY

## 2020-09-30 PROCEDURE — 72197 MRI PELVIS W/O & W/DYE: CPT

## 2020-09-30 PROCEDURE — 6360000004 HC RX CONTRAST MEDICATION: Performed by: SURGERY

## 2020-09-30 PROCEDURE — 76857 US EXAM PELVIC LIMITED: CPT

## 2020-09-30 PROCEDURE — 82565 ASSAY OF CREATININE: CPT

## 2020-09-30 PROCEDURE — 84520 ASSAY OF UREA NITROGEN: CPT

## 2020-09-30 PROCEDURE — 97110 THERAPEUTIC EXERCISES: CPT

## 2020-09-30 PROCEDURE — 2580000003 HC RX 258: Performed by: SURGERY

## 2020-09-30 PROCEDURE — 97140 MANUAL THERAPY 1/> REGIONS: CPT

## 2020-09-30 RX ORDER — SODIUM CHLORIDE 0.9 % (FLUSH) 0.9 %
10 SYRINGE (ML) INJECTION ONCE
Status: COMPLETED | OUTPATIENT
Start: 2020-09-30 | End: 2020-09-30

## 2020-09-30 RX ADMIN — Medication 10 ML: at 07:31

## 2020-09-30 RX ADMIN — GADOTERIDOL 15 ML: 279.3 INJECTION, SOLUTION INTRAVENOUS at 07:30

## 2020-09-30 NOTE — TELEPHONE ENCOUNTER
Patient called, megan BACON updated spoke to Dr. Patricia Mcdonald on last visit, Need done ASAP, personally reprinted form and gave to Dr. Patricia Mcdonald.

## 2020-09-30 NOTE — FLOWSHEET NOTE
[] [facility]) - [facility] &  Therapy  [address]  [phone]  F: [phone] [] [address]  [place] [address]   [address]  P: [phone]  F: [phone] [x] [address]  Therapy  [address] [facility] [address]  P: [phone]  F: [phone] [] [address]  [facility]   [address]   [place]: [phone]  F: [phone]      Physical Therapy Daily Treatment Note    Date:  2020  Patient Name:  Tarun Mart    :  1955  MRN: [id number]  Physician: Dr. Sharon Mon: Medical [place] (unlimited visit)  Medical Diagnosis: Cervical and Lumbar pain, arm contusion                   Rehab Codes: M54.2, M54.5, S40.029A  Onset Date: 20               Next 's appt.: 20     Visit# / total visits: (corrected visit number)                  Cancels/No Shows: 0    Cancels/No Shows: 0/0    Subjective:    Pain:  [x] Yes  [] No Location: Low back R sided cervical pain Pain Rating: (0-10 scale) 6/10  Pain altered Tx:  [x] No  [] Yes  Action:  Comments: Pt arrived stating that L sided neck pain is gone, currently having R sided neck pain.      Exercises:  MHP: Aric Robles 15' start of session- not today   Exercise Reps/ Time Weight/ Level Comments   SCIFIT/Nu step 6' L1.0 Stopped at 5' d/t increased Low back pain                Seated lumbar flexion ball stretch 10x3\"     LTR 10x5     Supine piriformis stretch 3x10\"           Seated Mid back stretch 3x10\" hold      Seated UT stretch 3x30\"     Seated Leavtor stretch 3x30\"     Cervical AROM x  HEP   Doorway pec stretch 3x30\"     Seated scapular retraction/post sh rolls 2x10 each     Standing scap depression 5\"x10     Other: bolded only this date  Manual:   DI to R Levator- pt states to improved mobility and decreased pain post manual   IDN to R UT (two points), C3,C4 R lateral, paravetebral C3/C4, homeostatic point: dorsal scapular. Minor discomfort with needle insertion into dorsal scapular point. Specific Instructions for subsequent treatments: Continue with land for neck and aquatics with low back       Treatment Charges: Mins Units   []  Modalities HP     [x]  Ther Exercise 15 1   [x]  Manual Therapy 25 2   []  Ther Activities     []  Aquatics     []  Vasocompression     []  Other     Total Treatment time 40 3       Assessment: [x] Progressing toward goals. Began trial of IDN which pt tolerated well without any discomfort or adverse effects. Will monitor at next appt for any signs or symptoms of an allergic reaction. Continued with manual, listed above, after IDN with decreased tightness noted          [] No change. [] Other:   [x] Patient would continue to benefit from skilled physical therapy services in order to: Patient would benefit from skilled physical therapy services in order to: Decrease low back pain, increase BLE strength and increase low back ROM, increase cervical ROM. STG: (to be met in 10 treatments)  1. ? Pain: Pt to have decreased low back pain to 4/10   2. ? ROM:Pt to have improved lumbar extension to only 25% impaired  3. ? Strength: Pt to have BLE strength globally to 5/5  4. ? Function: Pt to report increased ability to walk without an increase in pain   5. Patient to be independent with home exercise program as demonstrated by performance with correct form without cues.     LTG: (to be met in 20 treatments)  1. Pt to have decreased low back pain to 1-2/10  2. Pt to have full painfree AROM lumbar spine  3. Pt to have full painfree AROM cervical spine (added 8/24)        Patient goals: To have no back pain     Pt. Education:  [x] Yes  [] No  [] Reviewed Prior HEP/Ed  Method of Education: [x] Verbal  [x] Demo  [x] Written  Comprehension of Education:  [x] Verbalizes understanding.   [x] Demonstrates understanding. [x] Needs review. [] Demonstrates/verbalizes HEP/Ed previously given. Plan: [x] Continue current frequency toward long and short term goals.           Time In: 1000          Time Out: 1050    Electronically signed by:  Sapphire Deluca PT

## 2020-10-05 ENCOUNTER — HOSPITAL ENCOUNTER (OUTPATIENT)
Dept: PHYSICAL THERAPY | Facility: CLINIC | Age: 65
Setting detail: THERAPIES SERIES
Discharge: HOME | End: 2020-10-05
Payer: COMMERCIAL

## 2020-10-05 PROCEDURE — 97113 AQUATIC THERAPY/EXERCISES: CPT

## 2020-10-05 NOTE — FLOWSHEET NOTE
[] [facility]       Physical Therapy MOB2       [facility]        [address]       Phone: [phone]       Fax: [phone] [] [facility]       Promotion at [address] [facility]       Phone: [phone]       Fax: [phone] [x] Janeen. [facility]5 [facility] Promotion  [address]   Phone: [phone]   Fax:  [phone]     Physical Therapy Daily  Aquatic Treatment Note    Date:  10/5/2020  Patient Name:  Caio Benavides    :  1955  MRN: [id number]  Physician: Dr. Hermosillo Dire: Kameron Hernandez (unlimited visit)  Medical Diagnosis: Cervical and Lumbar pain, arm contusion                   Rehab Codes: M54.2, M54.5, S40.029A  Onset Date: 20               Next 's appt.: 10/28/2020    Visit# / total visits:   Cancels/No Shows: 0    Subjective:    Pain:  [x] Yes  [] No Location: LB    Pain Rating: (0-10 scale) 6/10  Pain altered Tx:  [x] No  [] Yes  Action:  Comments: Pt mentioned that her pain levels were doing good for a couple of days but now her back is achy today. Pt also mentioned that the dry needling from last session helped but it was short lived.    Objective:     KEY  B = Belt G = Gloves N = Noodle   C = Cuffs K = Kickboard P = Paddles   CC = Cervical Collar L = Laps T = Theratube   DB = Dumbells M = Minutes W = Weights     Exercises/Activities/  Warm-up/Amb  10 Dynamic Exercises 9/28 10/5   Forward 4L 4L March 4L 4L   Sideways 4L 4L Squat     Backwards 4L 4L Retro HS curls 4L 4L      Retro SLR     Stretches   Braiding     Gastroc/Soleus   Heel to Toe amb     Hamstring   Toe amb     Hip flexor   Heel amb     Piriformis        SKTC        Pec Stretch        Post Deltoid   Static Exercises UE        Shoulder flex/ext  15   Static Exercises LE   Shoulder abd/add  15   Heel/toe raises 15 15 Shoulder H.  abd/add  15   Marches 15 15 Shoulder IR/ER     Mini-squats 15 15 Rowing     4-way hip  15 15 Arm Circles     Hamstring curls 15 15 UT shrugs/rolls     Hip Circles/Fig 8   Scap squeezes     Ankle ROM   Diagonals 1/2     Lunges    Elbow flex/ext        Pron/Sup     Functional Exercise   Wrist AROM     Step        Wall Push-ups   Deep H20/     SLS   Bike 3m 3m   Breast Stroke on Noodle   Hip abd/add     Noodle Twist   Hip flex/ext     Noodle Push down   Hip IR/ER     Kickboard push/pull   Knee flex/ext        Push/pull on [employer] 5m   Other:    Specific Instructions for next treatment:    Treatment Charges: Mins Units   []  Modalities     []  Ther Exercise     []  Manual Therapy     []  Ther Activities     [x]  Aquatics 30 2   []  Other       Assessment: [x] Progressing toward goals. [] No change. [x] Other: Pt required verbal and tactile cueing for exercise technique and recall. Modified movements to stay in pain free range. Able to add in UE exercises to program today with focus on core activation. Pt mentioned that the UE exercises felt good on her shoulders. STG: (to be met in 10 treatments)  1. ? Pain: Pt to have decreased low back pain to 4/10  2. ? ROM:Pt to have improved lumbar extension to only 25% impaired  3. ? Strength: Pt to have BLE strength globally to 5/5  4. ? Function: Pt to report increased ability to walk without an increase in pain   5. Patient to be independent with home exercise program as demonstrated by performance with correct form without cues.     LTG: (to be met in 20 treatments)  1. Pt to have decreased low back pain to 1-2/10  2. Pt to have full painfree AROM lumbar spine        Patient goals: To have no back pain     Pt. Education:  [x] Yes  [] No  [x] Reviewed Prior HEP/Ed  Method of Education: [x] Verbal  [] Demo  [] Written  Comprehension of Education:  [x] Verbalizes understanding. [] Demonstrates understanding. [] Needs review. [] Demonstrates/verbalizes HEP/Ed previously given.      Plan: [x] Continue per plan of care.    [] Other:      Time In: 9:10am            Time Out:  10:00am    Electronically signed by:  Klaudia Mott PTA

## 2020-10-06 NOTE — FLOWSHEET NOTE
[] [facility]) [facility] &  Therapy  [address]  [phone]  F: [phone] [] [address] [facility] [address]   [address]  P: [phone]  F: [phone] [] [place]  [address] [facility]  P: [phone]  F: [phone] [] [address]  [facility]   [address]   [place]: [phone]  F: [phone]     THERAPY RESPONSIBILITY OF CARE TRANSFER FORM       PATIENT NAME: Lindia Hatchet  MRN: [id number]   : 1955      TRANSFERRING FACILITY:    [x] Tatyana Rayo   [] [facility]   []  [facility]   [] [facility]   [] [facility]   [] Beat[facility] Pe[facility]   [] [facility]  [] [facility]   [] Other:       ACCEPTING FACILITY   [x] Tatyana Rayo   [] [facility]   []  [facility]   [] [facility]   [] [facility]   [] [facility]   [] [facility]  [] [facility]   [] Other:          REASON FOR TRANSFER: Primary therapist transferring facilities. Pt would like to continue with dry needling. TRANSFER OF CARE:    I am transferring the care of the above patient to: Tatyana Rayo, AVERY Hansen, PT  10/6/2020      ACCEPTANCE OF CARE:     I am accepting the care of the above patient.  Tatyana Rayo, AVERY

## 2020-10-08 ENCOUNTER — HOSPITAL ENCOUNTER (OUTPATIENT)
Dept: PHYSICAL THERAPY | Facility: CLINIC | Age: 65
Setting detail: THERAPIES SERIES
Discharge: HOME | End: 2020-10-08
Payer: COMMERCIAL

## 2020-10-08 PROCEDURE — 97140 MANUAL THERAPY 1/> REGIONS: CPT

## 2020-10-08 PROCEDURE — 97110 THERAPEUTIC EXERCISES: CPT

## 2020-10-08 NOTE — PROGRESS NOTES
Vasocompression     [x]  Other: Dry Needlign 5 0   Total Treatment time 45 3       Assessment: [x] Progressing toward goals. Pt reported significant improvement in pain post DN, especially in low back. Pt states \"this is the best my back has felt in a very long time\". Will continue with DN next visit, as well as adding in gentle low back stretching and core strengthening as tolerated. Discussed primary therapist transferring clinics, patient would like to stay with primary therapist at [facility] for neck pain for land and [facility] [place] for aquatic therapy for back. [] No change. [] Other:   [x] Patient would continue to benefit from skilled physical therapy services in order to: Patient would benefit from skilled physical therapy services in order to: Decrease low back pain, increase BLE strength and increase low back ROM, increase cervical ROM. STG: (to be met in 10 treatments)  1. ? Pain: Pt to have decreased low back pain to 4/10 Progressing  2. ? ROM:Pt to have improved lumbar extension to only 25% impaired MET  3. ? Strength: Pt to have BLE strength globally to 5/5- progressing, 4+/5  4. ? Function: Pt to report increased ability to walk without an increase in pain - MET  5. Patient to be independent with home exercise program as demonstrated by performance with correct form without cues. - MET     LTG: (to be met in 20 treatments)  1. Pt to have decreased low back pain to 1-2/10- Progressing, pt has some days with 2/10 pain, others with up to 8/10 low back pain  2. Pt to have full painfree AROM lumbar spine- Progressing, noted difficulty remains with lumbar extension  3.  Pt to have full painfree AROM cervical spine (added 8/24)        Patient goals: To have no back pain     Treatment Plan:  [x] Therapeutic Exercise   33593  [] Iontophoresis: 4 mg/mL Dexamethasone Sodium Phosphate  mAmin  23005   [] Therapeutic Activity  13654 [] Vasopneumatic cold with compression  87182    [] Gait Training   12354 [] Ultrasound   [id number]   [] Neuromuscular Re-education  [id number] [] Electrical Stimulation Unattended  10973   [x] Manual Therapy  74674 [] Electrical Stimulation Attended  [id number]   [x] Instruction in HEP  [] Lumbar/Cervical Traction  [id number]   [] Aquatic Therapy   [id number] [] Cold/hotpack    [] Massage   26077      [x] Dry Needling, 1 or 2 muscles  82389   [] Biofeedback, first 15 minutes   09789  [] Biofeedback, additional 15 minutes   76399 [x] Dry Needling, 3 or more muscles  29334       Patient Status:     [x] Continue per initial plan of care. [x] Additional visits necessary. Pt would like to continue physical therapy, especially integrative dry needling which is providing significant relief for the patient. [] Other:     Requested Frequency/Duration: 2 times per week for 10 treatments. Electronically signed by Tripp Cabrera PT on 10/8/2020 at 10:48 AM      If you have any questions or concerns, please don't hesitate to call. Thank you for your referral.    Physician Signature:________________________________Date:__________________  By signing above or cosigning this note, I have reviewed this plan of care and certify a need for medically necessary rehabilitation services.      *PLEASE SIGN ABOVE AND FAX BACK ALL PAGES*

## 2020-10-08 NOTE — FLOWSHEET NOTE
[] [facility]) - [facility] &  Therapy  [address]  [phone]  F: [phone] [] [address]  [facility] [place] [facility]\"" [address]   [address]  P: [phone]  F: [phone] [x] [address] &  Therapy  [address] [facility]  P: [phone]  F: [phone] [] [address]  [facility]   [address]   [place]: [phone]  F: [phone]      Physical Therapy Daily Treatment Note    Date:  10/8/2020  Patient Name:  Tarun Mart    :  1955  MRN: [id number]  Physician: Dr. Sharon Mon: [facility] [place] (unlimited visit)  Medical Diagnosis: Cervical and Lumbar pain, arm contusion                   Rehab Codes: M54.2, M54.5, S40.029A  Onset Date: 20               Next 's appt.: 20     Visit# / total visits: (corrected visit number)                  Cancels/No Shows: 0    Cancels/No Shows: 0/0    Subjective:    Pain:  [x] Yes  [] No Location: Low back R sided cervical pain Pain Rating: (0-10 scale) 6/10  Pain altered Tx:  [x] No  [] Yes  Action:  Comments: Pt arrived stating to feeling great after last visit. Pain did not return to neck until last night. Pt does state to having continuing low back pain.      Exercises:  MHP: Aric Barriga - 15' start of session- not today   Exercise Reps/ Time Weight/ Level Comments   SCIFIT/Nu step 6' L1.0 Stopped at 5' d/t increased Low back pain          PPT   Next    Seated lumbar flexion ball stretch 10x3\"     LTR 10x5  Return next   Supine piriformis stretch 3x10\"  Return next          Seated Mid back stretch 3x10\" hold      Seated UT stretch 3x30\"     Seated Leavtor stretch 3x30\"     Cervical AROM x  HEP   Doorway pec stretch 3x30\"     Seated scapular retraction/post sh rolls 2x10 each     Standing scap depression 5\"x10     Other: bolded only this date  Manual:   DI to R Levator- pt states to improved mobility and decreased pain post manual   IDN to R UT (three points), C3,C4 R lateral, paravetebral T3, homeostatic point: dorsal scapular. Minor discomfort with needle insertion into R lateral cervical C4 point. Paravetebral L3/L4, Homeostatic point superior gluteal.            Specific Instructions for subsequent treatments: Continue with land for neck and aquatics with low back, add in some gentle low back stretching during next land visit      Treatment Charges: Mins Units   []  Modalities HP     [x]  Ther Exercise 15 1   [x]  Manual Therapy 25 2   []  Ther Activities     []  Aquatics     []  Vasocompression     [x]  Other: Dry Needlign 5 0   Total Treatment time 45 3     Cervical AROM: WFL, except for R rotation (limited by 25% with pain)    Assessment: [x] Progressing toward goals. Pt reported significant improvement in pain post DN, especially in low back. Pt states \"this is the best my back has felt in a very long time\". Will continue with DN next visit, as well as adding in gentle low back stretching and core strengthening as tolerated. Discussed primary therapist transferring clinics, patient would like to stay with primary therapist at [facility] for neck pain for land and [place] for aquatic therapy for back. [] No change. [] Other:   [x] Patient would continue to benefit from skilled physical therapy services in order to: Patient would benefit from skilled physical therapy services in order to: Decrease low back pain, increase BLE strength and increase low back ROM, increase cervical ROM.      STG: (to be met in 10 treatments)  1. ? Pain: Pt to have decreased low back pain to 4/10 Progressing  2. ? ROM:Pt to have improved lumbar extension to only 25% impaired MET  3. ? Strength: Pt to have BLE strength globally to 5/5- progressing, 4+/5  4. ? Function: Pt to report increased ability to walk without an increase in pain - MET  5. Patient to be independent with home exercise program as demonstrated by performance with correct form without cues. - MET     LTG: (to be met in 20 treatments)  1. Pt to have decreased low back pain to 1-2/10- Progressing, pt has some days with 2/10 pain, others with up to 8/10 low back pain  2. Pt to have full painfree AROM lumbar spine- Progressing, noted difficulty remains with lumbar extension  3. Pt to have full painfree AROM cervical spine (added 8/24)        Patient goals: To have no back pain     Pt. Education:  [x] Yes  [] No  [] Reviewed Prior HEP/Ed  Method of Education: [x] Verbal  [x] Demo  [x] Written  Comprehension of Education:  [x] Verbalizes understanding. [x] Demonstrates understanding. [x] Needs review. [] Demonstrates/verbalizes HEP/Ed previously given. Plan: [x] Continue current frequency toward long and short term goals.           Time In: 1000          Time Out: 1050    Electronically signed by:  Tricia June, PT

## 2020-10-14 ENCOUNTER — HOSPITAL ENCOUNTER (OUTPATIENT)
Dept: PHYSICAL THERAPY | Facility: CLINIC | Age: 65
Setting detail: THERAPIES SERIES
Discharge: HOME | End: 2020-10-14
Payer: COMMERCIAL

## 2020-10-14 PROCEDURE — 97140 MANUAL THERAPY 1/> REGIONS: CPT

## 2020-10-14 PROCEDURE — 97110 THERAPEUTIC EXERCISES: CPT

## 2020-10-14 NOTE — FLOWSHEET NOTE
[] [facility] [facility]  [address]  [phone]  F: [phone] [] [address]  [facility] 36   [address]  P: [phone]  F: [phone] [x] [address]  Therapy  [address]  P: [phone]  F: [phone] [] [address]  [facility]   [address]   [place]: [phone]  F: [phone]      Physical Therapy Daily Treatment Note    Date:  10/14/2020  Patient Name:  Don Johnson    :  1955  MRN: [id number]  Physician: Dr. Maritza Antunez: [facility] [place] (unlimited visit)  Medical Diagnosis: Cervical and Lumbar pain, arm contusion                   Rehab Codes: M54.2, M54.5, S40.029A  Onset Date: 20               Next 's appt.: 20     Visit# / total visits: (corrected visit number)                  Cancels/No Shows: 0    Cancels/No Shows: 0/0    Subjective:    Pain:  [x] Yes  [] No Location: R scapular area, low back Pain Rating: (0-10 scale) 5/10  Pain altered Tx:  [x] No  [] Yes  Action:  Comments:  Pt arrives stating to noting a continued improvement in back, only noting an increase in pain yesterday.      Exercises:  EMMANUELP: Sheeba Lisandro - 15' start of session- not today   Exercise Reps/ Time Weight/ Level Comments   SCIFIT/Nu step 6' L1.0 Stopped at 5' d/t increased Low back pain          PPT   Next    Seated lumbar flexion ball stretch 10x3\"     LTR 10x5  Return next   Supine piriformis stretch 3x10\"  Return next          Seated Mid back stretch 3x10\" hold      Seated UT stretch 3x30\"     Seated Leavtor stretch 3x30\"     Cervical AROM x  HEP   Doorway pec stretch 3x30\"     Seated scapular retraction/post sh rolls 2x10 each     Standing scap depression 5\"x10     Other: bolded only this date  Manual:   Hypervolt to DUANE thoracic paraspinals, R piriformis  DI to R piriformis   IDN to R UT (three points), C3,C4 R lateral, paravetebral T3, homeostatic point: dorsal scapular. Minor discomfort with needle insertion into R lateral cervical C4 point. Paravetebral L3/L4, Homeostatic point superior gluteal. Mid scapular border, T5/T6 paravetebral           Specific Instructions for subsequent treatments: Continue with land for neck and aquatics with low back, add in some gentle low back stretching during next land visit      Treatment Charges: Mins Units   []  Modalities HP     [x]  Ther Exercise 15 1   [x]  Manual Therapy 25 2   []  Ther Activities     []  Aquatics     []  Vasocompression     [x]  Other: Dry Needlign 5 0   Total Treatment time 45 3     Cervical AROM: WFL, except for R rotation (limited by 25% with pain)    Assessment: [x] Progressing toward goals. Continued with DN to cervical and lumbar regions, added in thoracic needling d/t pain noted with palpation. Decreased pain post DN and manual as noted above. Re introduced upper back and piriformis stretching d/t significant tightness noted; instructed pt to continue at home which pt voiced understanding. Discussed primary therapist transferring clinics, patient would like to stay with primary therapist at [address] [place] [address] for neck pain for land and [place] for aquatic therapy for back. [] No change. [] Other:   [x] Patient would continue to benefit from skilled physical therapy services in order to: Patient would benefit from skilled physical therapy services in order to: Decrease low back pain, increase BLE strength and increase low back ROM, increase cervical ROM.      STG: (to be met in 10 treatments)  1. ? Pain: Pt to have decreased low back pain to 4/10 Progressing  2. ? ROM:Pt to have improved lumbar extension to only 25% impaired MET  3. ? Strength: Pt to have BLE strength globally to 5/5- progressing, 4+/5  4. ? Function: Pt to report increased ability to

## 2020-10-16 ENCOUNTER — HOSPITAL ENCOUNTER (OUTPATIENT)
Dept: PHYSICAL THERAPY | Facility: CLINIC | Age: 65
Setting detail: THERAPIES SERIES
Discharge: HOME | End: 2020-10-16
Payer: COMMERCIAL

## 2020-10-16 PROCEDURE — 97113 AQUATIC THERAPY/EXERCISES: CPT

## 2020-10-16 NOTE — FLOWSHEET NOTE
[] [facility]       Physical Therapy MOB2       [facility] [address]        [address]       Phone: [phone]       Fax: [phone] [] [facility]       Promotion at [address] [facility]       Phone: [phone]       Fax: [phone] [x] Janeen. [address] [place] [address] Health Promotion  [address] [place] [address]   Phone: [phone]   Fax:  [phone]     Physical Therapy Daily  Aquatic Treatment Note    Date:  10/16/2020  Patient Name:  Rick Saenz    :  1955  MRN: [id number]  Physician: Dr. Gloria Leiva: [address] [place] [address] (unlimited visit)  Medical Diagnosis: Cervical and Lumbar pain, arm contusion                   Rehab Codes: M54.2, M54.5, S40.029A  Onset Date: 20               Next 's appt.: 10/28/2020    Visit# / total visits:   Cancels/No Shows: 0    Subjective:    Pain:  [x] Yes  [] No Location: LB    Pain Rating: (0-10 scale) 5/10  Pain altered Tx:  [x] No  [] Yes  Action:  Comments: Pt said she was feeling sore today but less sore than normal. Said she received dry needling yesterday and that spot is still pretty sore but she feels better.    Objective:     KEY  B = Belt G = Gloves N = Noodle   C = Cuffs K = Kickboard P = Paddles   CC = Cervical Collar L = Laps T = Theratube   DB = Dumbells M = Minutes W = Weights     Exercises/Activities/  Warm-up/Amb 10/5 10/16 Dynamic Exercises 10/5 10/16   Forward 4L 4L March 4L 4L   Sideways 4L 4L Squat     Backwards 4L 4L Retro HS curls 4L 4L      Retro SLR     Stretches   Braiding     Gastroc/Soleus   Heel to Toe amb     Hamstring   Toe amb     Hip flexor   Heel amb     Piriformis        SKTC        Pec Stretch        Post Deltoid   Static Exercises UE        Shoulder flex/ext 15 15   Static Exercises LE   Shoulder abd/add 15 15   Heel/toe raises 15 15 Shoulder H.  abd/add 15 15   March 15 15 Shoulder IR/ER     Mini-squats 15 15 Rowing     4-way hip  15 15 Arm Circles     Hamstring curls 15 15 UT shrugs/rolls     Hip Circles/Fig 8   Scap squeezes     Ankle ROM   Diagonals 1/2     Lunges    Elbow flex/ext        Pron/Sup     Functional Exercise   Wrist AROM     Step        Wall Push-ups   Deep H20/     SLS   Bike 3m 3m   Breast Stroke on Noodle   Hip abd/add     Noodle Twist   Hip flex/ext     Noodle Push down   Hip IR/ER     Kickboard push/pull   Knee flex/ext        Push/pull on [employer] 5m   Other:    Specific Instructions for next treatment:    Treatment Charges: Mins Units   []  Modalities     []  Ther Exercise     []  Manual Therapy     []  Ther Activities     [x]  Aquatics 30 2   []  Other       Assessment: [x] Progressing toward goals. [] No change. [x] Other: Pt required verbal cueing and demo for exercise recall and technique. Correction to dynamic retro HS curls was provided to avoid hip flexion during the curl and to just kick the foot back while keeping knees in line. Squats were being performed on the toes so cueing was provided to sit backwards and maintain the wt in the heels rather than coming up on the toes. Pt was able to perform exercises with proper technique once cued. STG: (to be met in 10 treatments)  1. ? Pain: Pt to have decreased low back pain to 4/10  2. ? ROM:Pt to have improved lumbar extension to only 25% impaired  3. ? Strength: Pt to have BLE strength globally to 5/5  4. ? Function: Pt to report increased ability to walk without an increase in pain   5. Patient to be independent with home exercise program as demonstrated by performance with correct form without cues.     LTG: (to be met in 20 treatments)  1. Pt to have decreased low back pain to 1-2/10  2. Pt to have full painfree AROM lumbar spine        Patient goals: To have no back pain     Pt.  Education:  [x] Yes  [] No  [x] Reviewed Prior HEP/Ed  Method of Education: [x] Verbal  [] Demo  [] Written  Comprehension of Education:  [x] Verbalizes understanding. [] Demonstrates understanding. [] Needs review. [] Demonstrates/verbalizes HEP/Ed previously given. Plan: [x] Continue per plan of care.    [] Other:      Time In: 9:59am            Time Out:  11:04am    Electronically signed by:  Rhona Camacho PTA

## 2020-10-21 ENCOUNTER — HOSPITAL ENCOUNTER (OUTPATIENT)
Dept: PHYSICAL THERAPY | Facility: CLINIC | Age: 65
Setting detail: THERAPIES SERIES
Discharge: HOME | End: 2020-10-21
Payer: COMMERCIAL

## 2020-10-21 PROCEDURE — 97110 THERAPEUTIC EXERCISES: CPT

## 2020-10-21 PROCEDURE — 97140 MANUAL THERAPY 1/> REGIONS: CPT

## 2020-10-21 NOTE — FLOWSHEET NOTE
[] [facility]  Outpatient Rehabilitation &  Therapy  [address]  [phone]  F: [phone] [] [address] [facility] [address]   [address]  P: [phone]  F: [phone] [x] [address] &  Therapy  [address] [facility]  P: [phone]  F: [phone] [] [address]  [facility]   [address]   [place]: [phone]  F: [phone]      Physical Therapy Daily Treatment Note    Date:  10/21/2020  Patient Name:  Eleni Hahn    :  1955  MRN: [id number]  Physician: Dr. Dwight Lea: Medical [place] (unlimited visit)  Medical Diagnosis: Cervical and Lumbar pain, arm contusion                   Rehab Codes: M54.2, M54.5, S40.029A  Onset Date: 20               Next 's appt.: 20     Visit# / total visits:                  Cancels/No Shows: 0    Cancels/No Shows: 0/0    Subjective:    Pain:  [x] Yes  [] No Location: R scapular area, low back Pain Rating: (0-10 scale) 2/10  Pain altered Tx:  [x] No  [] Yes  Action:  Comments:  Pt arrives stating to having continued improvement in pain     Exercises:  MHP: large,cervical - 15' start of session- not today   Exercise Reps/ Time Weight/ Level Comments   SCIFIT/Nu step 6' L1.0 Stopped at 5' d/t increased Low back pain          PPT   Next    Seated lumbar flexion ball stretch 10x3\"     LTR 10x5     Supine piriformis stretch 3x10\"  =         Seated Mid back stretch 3x10\" hold      Seated UT stretch 3x30\"     Seated Leavtor stretch 3x30\"     Cervical AROM x  HEP   Doorway pec stretch 3x30\"     Seated scapular retraction/post sh rolls 2x10 each     Standing scap depression 5\"x10     Other: bolded only this date    Manual:   DI to R piriformis   IDN to R UT (three points), C3,C4 R lateral, paravetebral T3,T4, T5, medial border of scapular bilateral.  homeostatic point: R spinal accessory. Minor discomfort with needle insertion into R lateral cervical C4 and medial border of scapular point. Paravetebral R L3/L4, Homeostatic point R superior gluteal.            Specific Instructions for subsequent treatments: Continue with land for neck and aquatics with low back, add in some gentle low back stretching during next land visit      Treatment Charges: Mins Units   []  Modalities HP     [x]  Ther Exercise 15 1   [x]  Manual Therapy 25 2   []  Ther Activities     []  Aquatics     []  Vasocompression     [x]  Other: Dry Needlign 5 0   Total Treatment time 45 3         Assessment: [x] Progressing toward goals. Pt with continued improvement in pain, stating to only having thoracic pain and minor R sided cervical pain. Will continue with manual and dry needling to decrease patient's      Discussed primary therapist transferring clinics, patient would like to stay with primary therapist at [facility] for neck pain for land and [facility] for aquatic therapy for back. [] No change. [] Other:   [x] Patient would continue to benefit from skilled physical therapy services in order to: Patient would benefit from skilled physical therapy services in order to: Decrease low back pain, increase BLE strength and increase low back ROM, increase cervical ROM. STG: (to be met in 10 treatments)  1. ? Pain: Pt to have decreased low back pain to 4/10 Progressing  2. ? ROM:Pt to have improved lumbar extension to only 25% impaired MET  3. ? Strength: Pt to have BLE strength globally to 5/5- progressing, 4+/5  4. ? Function: Pt to report increased ability to walk without an increase in pain - MET  5. Patient to be independent with home exercise program as demonstrated by performance with correct form without cues. - MET     LTG: (to be met in 20 treatments)  1.  Pt to have decreased low back pain to 1-2/10- Progressing, pt has some days with 2/10 pain, others with up to 8/10 low back pain  2. Pt to have full painfree AROM lumbar spine- Progressing, noted difficulty remains with lumbar extension  3. Pt to have full painfree AROM cervical spine (added 8/24)        Patient goals: To have no back pain     Pt. Education:  [x] Yes  [] No  [] Reviewed Prior HEP/Ed  Method of Education: [x] Verbal  [x] Demo  [x] Written  Comprehension of Education:  [x] Verbalizes understanding. [x] Demonstrates understanding. [x] Needs review. [] Demonstrates/verbalizes HEP/Ed previously given. Plan: [x] Continue current frequency toward long and short term goals.           Time In: 1001          Time Out: 1050    Electronically signed by:  Edwin Adam PT

## 2020-10-23 ENCOUNTER — HOSPITAL ENCOUNTER (OUTPATIENT)
Dept: PHYSICAL THERAPY | Facility: CLINIC | Age: 65
Setting detail: THERAPIES SERIES
Discharge: HOME | End: 2020-10-23
Payer: COMMERCIAL

## 2020-10-23 PROCEDURE — 97113 AQUATIC THERAPY/EXERCISES: CPT

## 2020-10-23 NOTE — FLOWSHEET NOTE
[] [address] [facility] [address] Out[facility]       Physical Therapy [facility]       [facility] [address]        [address]       Phone: [phone]       Fax: [phone] [] [facility]       Promotion at [address] [facility]       Phone: [phone]       Fax: [phone] [x] Jane[place]. [address] [facility] Promotion  [address] [facility]   Phone: [phone]   Fax:  [phone]     Physical Therapy Daily  Aquatic Treatment Note    Date:  10/23/2020  Patient Name:  Eleni Hahn    :  1955  MRN: [id number]  Physician: Dr. Oc Urbina: [facility] (unlimited visit)  Medical Diagnosis: Cervical and Lumbar pain, arm contusion                   Rehab Codes: M54.2, M54.5, S40.029A  Onset Date: 20               Next 's appt.: 10/28/2020    Visit# / total visits:   Cancels/No Shows: 0    Subjective:    Pain:  [x] Yes  [] No Location: LB    Pain Rating: (0-10 scale) 5/10  Pain altered Tx:  [x] No  [] Yes  Action:  Comments: Pt says she is feeling really good today. She said she only had some mild soreness in the lower back but pointed to mid-back under the scapula and said most of her soreness is there, mentioned the dry needling has really helped her pain. Mentioned that her therapist at [facility] asked if she wanted to discontinue aquatherapy but refused as it helps relax her back.    Objective:     KEY  B = Belt G = Gloves N = Noodle   C = Cuffs K = Kickboard P = Paddles   CC = Cervical Collar L = Laps T = Theratube   DB = Dumbells M = Minutes W = Weights     Exercises/Activities/  Warm-up/Amb 10/16 10/23 Dynamic Exercises 10/16 10/23   Forward 4L 4L March 4L 4L   Sideways 4L 4L Squat     Backwards 4L 4L Retro HS curls 4L 4L      Retro SLR     Stretches   Braiding     Gastroc/Soleus   Heel to Toe amb     Hamstring   Toe amb     Hip flexor   Heel amb     Piriformis        SKTC        Pec Stretch        Post Deltoid   Static Exercises UE        Shoulder flex/ext 15 15   Static Exercises LE   Shoulder abd/add 15 15   Heel/toe raises 15 15 Shoulder H.  abd/add 15 15   Marches 15 15 Shoulder IR/ER     Mini-squats 15 15 Rowing     4-way hip  15 15 Arm Circles     Hamstring curls 15 15 UT shrugs/rolls     Hip Circles/Fig 8   Scap squeezes     Ankle ROM   Diagonals 1/2     Lunges    Elbow flex/ext        Pron/Sup     Functional Exercise   Wrist AROM     Step        Wall Push-ups   Deep H20/     SLS   Bike 3m 3m   Breast Stroke on Noodle   Hip abd/add     Noodle Twist   Hip flex/ext     Noodle Push down   Hip IR/ER     Kickboard push/pull   Knee flex/ext        Push/pull on [employer] 5m   Other:    Specific Instructions for next treatment:    Treatment Charges: Mins Units   []  Modalities     []  Ther Exercise     []  Manual Therapy     []  Ther Activities     [x]  Aquatics 30 2   []  Other       Assessment: [x] Progressing toward goals. [] No change. [x] Other: Pt needed verbal cueing for recall of all exercises. Cued on proper from while cueing the exercise and pt was able to perform with good technique. Cued pt to use less ROM while performing hip abduction as it was increasing symptoms in her back. Cueing was also needed during shoulder horz abd/add to relax the shoulders from a \"shrugged\" position and to squeeze just the shoulder blades together without arching the back when she abducts. Instructed pt on proper way to stretch UT and Levator Scapula to help with tightness in the shoulders. STG: (to be met in 10 treatments)  1. ? Pain: Pt to have decreased low back pain to 4/10  2. ? ROM:Pt to have improved lumbar extension to only 25% impaired  3. ? Strength: Pt to have BLE strength globally to 5/5  4. ? Function: Pt to report increased ability to walk without an increase in pain   5.  Patient to be independent with home exercise program as demonstrated by performance with correct form without cues.     LTG: (to be met in 20 treatments)  1. Pt to have decreased low back pain to 1-2/10  2. Pt to have full painfree AROM lumbar spine        Patient goals: To have no back pain     Pt. Education:  [x] Yes  [] No  [x] Reviewed Prior HEP/Ed  Method of Education: [x] Verbal  [] Demo  [] Written  Comprehension of Education:  [x] Verbalizes understanding. [] Demonstrates understanding. [] Needs review. [] Demonstrates/verbalizes HEP/Ed previously given. Plan: [x] Continue per plan of care.    [] Other:      Time In: 9:24am            Time Out:  10:19am    Electronically signed by:  Olivia Cruz PTA

## 2020-10-28 ENCOUNTER — TELEPHONE (OUTPATIENT)
Dept: FAMILY MEDICINE CLINIC | Age: 65
End: 2020-10-28

## 2020-10-28 NOTE — TELEPHONE ENCOUNTER
Patient called office with concern for covid and is asking for a covid test. Patient has a fever of 100.3 today, and a headache. Patient is concerned due to being home with  (who just had surgery) and her grandson. Patient has an appointment with PCP on 10/30/2020 @ the [facility], but writer changed it to a phone visit. Informed patient she needs to quarantine for 10-14 days. Please advise.

## 2020-10-29 ENCOUNTER — HOSPITAL ENCOUNTER (OUTPATIENT)
Dept: PHYSICAL THERAPY | Facility: CLINIC | Age: 65
Setting detail: THERAPIES SERIES
Discharge: HOME | End: 2020-10-29
Payer: COMMERCIAL

## 2020-10-29 ENCOUNTER — HOSPITAL ENCOUNTER (OUTPATIENT)
Age: 65
Discharge: HOME | End: 2020-10-29

## 2020-10-29 NOTE — FLOWSHEET NOTE
[] [facility] &  Therapy  [address]    [phone]  F: [phone]   [] [address]  [facility] 36   [address]  P: [phone]  F: [phone]  [] [address] &  Therapy  [address] [facility] [address]  P: [phone]  F: [phone] [] [address]  P: [phone]  F: [phone]  [] [address]  [facility]   [address]   [place]: [phone]  F: [phone]   [] [place] [address] [facility] [address]  [place]: [phone]   F: [phone]     Physical Therapy Cancel/No Show note    Date: 10/29/2020  Patient: Anibal Velasco  : 1955  MRN: [id number]    Cancels/No Shows to date:     For today's appointment patient:    []  Cancelled    [] Rescheduled appointment    [x] No-show     Reason given by patient:    []  Patient ill    []  Conflicting appointment    [] No transportation      [] Conflict with work    [] No reason given    [] Weather related    [] COVID-19    [x] Other:      Comments:  Attempted to call pt, no answer and voicemail was full.        [] Next appointment was confirmed    Electronically signed by: Aniya Bee PT

## 2020-10-29 NOTE — TELEPHONE ENCOUNTER
PC from pt, advise to go to flu clinic for testing and evaluation.  Writer gave pt information for [facility]

## 2020-10-30 ENCOUNTER — HOSPITAL ENCOUNTER (OUTPATIENT)
Dept: PHYSICAL THERAPY | Facility: CLINIC | Age: 65
Setting detail: THERAPIES SERIES
Discharge: HOME | End: 2020-10-30
Payer: COMMERCIAL

## 2020-10-30 ENCOUNTER — TELEPHONE (OUTPATIENT)
Dept: FAMILY MEDICINE CLINIC | Age: 65
End: 2020-10-30

## 2020-10-30 ENCOUNTER — VIRTUAL VISIT (OUTPATIENT)
Dept: FAMILY MEDICINE CLINIC | Age: 65
End: 2020-10-30
Payer: COMMERCIAL
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
## 2020-10-30 PROBLEM — Z20.822 COVID-19 VIRUS TEST RESULT UNKNOWN: Status: ACTIVE | Noted: 2020-10-30

## 2020-10-30 PROCEDURE — 99213 OFFICE O/P EST LOW 20 MIN: CPT | Performed by: FAMILY MEDICINE

## 2020-10-30 PROCEDURE — U0003 INFECTIOUS AGENT DETECTION BY NUCLEIC ACID (DNA OR RNA); SEVERE ACUTE RESPIRATORY SYNDROME CORONAVIRUS 2 (SARS-COV-2) (CORONAVIRUS DISEASE [COVID-19]), AMPLIFIED PROBE TECHNIQUE, MAKING USE OF HIGH THROUGHPUT TECHNOLOGIES AS DESCRIBED BY CMS-2020-01-R: HCPCS

## 2020-10-30 RX ORDER — METHOCARBAMOL 500 MG/1
500 TABLET, FILM COATED ORAL 4 TIMES DAILY
Qty: 40 TABLET | Refills: 0 | Status: SHIPPED | OUTPATIENT
Start: 2020-10-30 | End: 2020-11-09

## 2020-10-30 ASSESSMENT — ENCOUNTER SYMPTOMS
SHORTNESS OF BREATH: 0
SORE THROAT: 1
COUGH: 1
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
## 2020-10-30 NOTE — TELEPHONE ENCOUNTER
Pt still has fever, cancelled appt, had covid testing done 10/29/2020 results pended. Pt wants to know if she should stop taking tylenol and what is protocol on her coming in for appointment with out any fever with out the tylenol. Please advise. Thank you.      Pt wanted you to respond via my chart per her request.

## 2020-10-31 LAB — SARS-COV-2, NAA: DETECTED

## 2020-11-01 ENCOUNTER — TELEPHONE (OUTPATIENT)
Dept: PRIMARY CARE CLINIC | Age: 65
End: 2020-11-01

## 2020-11-02 ENCOUNTER — TELEPHONE (OUTPATIENT)
Dept: FAMILY MEDICINE CLINIC | Age: 65
End: 2020-11-02

## 2020-11-02 NOTE — TELEPHONE ENCOUNTER
PC from pt stating she recently tested pos for COVID and is now experiencing leg cramps-bilateral. States she originally had headache and fever prior to testing but didn't have the leg pains. States fever has since gone away. Unsure if leg cramps are covid related sx but wanted to check w MD    Please review and advise     Pt asking for clarification on what to do after now testing positive-- how long to quarantine, whether she needs another test to test negative, and if so where to get order from?     She is a [employer] employee

## 2020-11-04 RX ORDER — CEPHALEXIN 500 MG/1
500 CAPSULE ORAL 2 TIMES DAILY
Qty: 14 CAPSULE | Refills: 0 | Status: SHIPPED | OUTPATIENT
Start: 2020-11-04 | End: 2020-11-11

## 2020-11-04 NOTE — TELEPHONE ENCOUNTER
Patient calling because she has been having severe leg cramps for about three days and she would like something for them if possible. She also says she has a boil on her bottom that has burst and was wondering if she needs an antibiotic for it. Please advise.

## 2020-11-10 ENCOUNTER — TELEPHONE (OUTPATIENT)
Dept: FAMILY MEDICINE CLINIC | Age: 65
End: 2020-11-10

## 2020-11-11 NOTE — TELEPHONE ENCOUNTER
Please contact Rosa Maza and let her know that her letter has been completed as of this request and is on file. We can forward it to her or she can stop by and pick it up this afternoon before 5 o'clock.

## 2020-12-08 ENCOUNTER — TELEPHONE (OUTPATIENT)
Dept: FAMILY MEDICINE CLINIC | Age: 65
End: 2020-12-08

## 2020-12-14 ENCOUNTER — HOSPITAL ENCOUNTER (OUTPATIENT)
Dept: PHYSICAL THERAPY | Facility: CLINIC | Age: 65
Setting detail: THERAPIES SERIES
Discharge: HOME | End: 2020-12-14
Payer: COMMERCIAL

## 2020-12-14 PROCEDURE — 97140 MANUAL THERAPY 1/> REGIONS: CPT

## 2020-12-14 NOTE — FLOWSHEET NOTE
piriformis, upper thoracic/lower cervical musculature  DI to R piriformis   IDN to R UT (three points), C3/C4, C5/C6, C7,/T1 R paravetebrals. homeostatic point: R spinal accessory. R sided superior cluneal points Homeostatic point R superior gluteal.            Specific Instructions for subsequent treatments: Continue with Dry Needling       Treatment Charges: Mins Units   [x]  Modalities HP 10 0   [x]  Ther Exercise 5 0   [x]  Manual Therapy 40 3   []  Ther Activities     []  Aquatics     []  Vasocompression     [x]  Other: Dry Needlign 5 0   Total Treatment time 60 3         Assessment: [x] Progressing toward goals. Pt resuming therapy after a hiatus d/t having COVID. Pt returns with increased R sided cervical neck and back pain, significantly decreased post Dry Needling and manual this date            [] No change. [] Other:   [x] Patient would continue to benefit from skilled physical therapy services in order to: Patient would benefit from skilled physical therapy services in order to: Decrease low back pain, increase BLE strength and increase low back ROM, increase cervical ROM. STG: (to be met in 10 treatments)  1. ? Pain: Pt to have decreased low back pain to 4/10 Progressing  2. ? ROM:Pt to have improved lumbar extension to only 25% impaired MET  3. ? Strength: Pt to have BLE strength globally to 5/5- progressing, 4+/5  4. ? Function: Pt to report increased ability to walk without an increase in pain - MET  5. Patient to be independent with home exercise program as demonstrated by performance with correct form without cues. - MET     LTG: (to be met in 20 treatments)  1. Pt to have decreased low back pain to 1-2/10- Progressing, pt has some days with 2/10 pain, others with up to 8/10 low back pain  2. Pt to have full painfree AROM lumbar spine- Progressing, noted difficulty remains with lumbar extension  3.  Pt to have full painfree AROM cervical spine (added 8/24)        Patient goals: To have no back pain     Pt. Education:  [x] Yes  [] No  [] Reviewed Prior HEP/Ed  Method of Education: [x] Verbal  [x] Demo  [x] Written  Comprehension of Education:  [x] Verbalizes understanding. [x] Demonstrates understanding. [x] Needs review. [] Demonstrates/verbalizes HEP/Ed previously given. Plan: [x] Continue current frequency toward long and short term goals.           Time In: 1640          Time Out: 3003    Electronically signed by:  Jigna Moore, PT

## 2020-12-16 ENCOUNTER — HOSPITAL ENCOUNTER (OUTPATIENT)
Dept: PHYSICAL THERAPY | Facility: CLINIC | Age: 65
Setting detail: THERAPIES SERIES
Discharge: HOME | End: 2020-12-16
Payer: COMMERCIAL

## 2020-12-21 ENCOUNTER — HOSPITAL ENCOUNTER (OUTPATIENT)
Dept: PHYSICAL THERAPY | Facility: CLINIC | Age: 65
Setting detail: THERAPIES SERIES
Discharge: HOME | End: 2020-12-21
Payer: COMMERCIAL

## 2020-12-21 PROCEDURE — 97110 THERAPEUTIC EXERCISES: CPT

## 2020-12-21 PROCEDURE — 97140 MANUAL THERAPY 1/> REGIONS: CPT

## 2020-12-21 NOTE — FLOWSHEET NOTE
[] [address]  [address]  [phone]  F: [phone] [] [address]  [facility] 36   [address]  P: [phone]  F: [phone] [x] [address] &  Therapy  [address] [facility]  P: [phone]  F: [phone] [] [address]  [facility]   [address]   [place]: [phone]  F: [phone]      Physical Therapy Daily Treatment Note    Date:  2020  Patient Name:  Cali Telles    :  1955  MRN: [id number]  Physician: Dr. Jovan Velasquez: Medical [place] (unlimited visit)  Medical Diagnosis: Cervical and Lumbar pain, arm contusion                   Rehab Codes: M54.2, M54.5, S40.029A  Onset Date: 20               Next 's appt.: 20     Visit# / total visits:                  Cancels/No Shows: 0    Cancels/No Shows: 0/0    Subjective:    Pain:  [x] Yes  [] No Location: R sided neck, back  Pain Rating: (0-10 scale) 8/10  Pain altered Tx:  [x] No  [] Yes  Action:  Comments:  Pt arrives with increased R sided neck pain and R side lower back pain     Exercises:    Exercise Reps/ Time Weight/ Level Comments   SCIFIT/Nu step 6' L1.0 Stopped at 5' d/t increased Low back pain          PPT   Next    Seated lumbar flexion ball stretch 10x3\"     LTR 10x5     Supine piriformis stretch 3x10\"           Seated Mid back stretch 3x10\" hold      Seated UT stretch 3x30\"     Seated Leavtor stretch 3x30\"     Cervical AROM x  HEP   Doorway pec stretch 3x30\"     Seated scapular retraction/post sh rolls 2x10 each     Standing scap depression 5\"x10     Other: bolded only this date  Hot pack cervical seated 10'     Manual: Hypervolt to R piriformis, upper thoracic/lower cervical musculature  DI to R piriformis  Somatic dysfunctions: FRSR C4/C5, C5/C6- MET     IDN to R UT (three points), C3/C4, C5/C6 R paravetebrals. homeostatic point: R spinal accessory. R sided superior cluneal points Homeostatic point R superior gluteal.            Specific Instructions for subsequent treatments: Continue with Dry Needling and stretching       Treatment Charges: Mins Units   []  Modalities HP     [x]  Ther Exercise 5 0   [x]  Manual Therapy 30 2   []  Ther Activities     []  Aquatics     []  Vasocompression     [x]  Other: Dry Needlign 5 0   Total Treatment time 40 2         Assessment: [x] Progressing toward goals. Pt noting significant improvement in pain and stiffness post manual this date. Previously was having difficulty turning head to the right, could now turn towards the right without pain. Distributed updated HEP with stretches to continue at home, pt voiced understanding. [] No change. [] Other:   [x] Patient would continue to benefit from skilled physical therapy services in order to: Patient would benefit from skilled physical therapy services in order to: Decrease low back pain, increase BLE strength and increase low back ROM, increase cervical ROM. STG: (to be met in 10 treatments)  1. ? Pain: Pt to have decreased low back pain to 4/10 Progressing  2. ? ROM:Pt to have improved lumbar extension to only 25% impaired MET  3. ? Strength: Pt to have BLE strength globally to 5/5- progressing, 4+/5  4. ? Function: Pt to report increased ability to walk without an increase in pain - MET  5. Patient to be independent with home exercise program as demonstrated by performance with correct form without cues. - MET     LTG: (to be met in 20 treatments)  1. Pt to have decreased low back pain to 1-2/10- Progressing, pt has some days with 2/10 pain, others with up to 8/10 low back pain  2. Pt to have full painfree AROM lumbar spine- Progressing, noted difficulty remains with lumbar extension  3.  Pt to have full painfree AROM cervical spine (added 8/24)        Patient goals: To have no back pain     Pt. Education:  [x] Yes  [] No  [] Reviewed Prior HEP/Ed  Method of Education: [x] Verbal  [x] Demo  [x] Written  Comprehension of Education:  [x] Verbalizes understanding. [x] Demonstrates understanding. [x] Needs review. [] Demonstrates/verbalizes HEP/Ed previously given. Plan: [x] Continue current frequency toward long and short term goals.           Time In: 1700          Time Out: 8761    Electronically signed by:  Ruthann Tinajero PT

## 2020-12-23 ENCOUNTER — HOSPITAL ENCOUNTER (OUTPATIENT)
Dept: PHYSICAL THERAPY | Facility: CLINIC | Age: 65
Setting detail: THERAPIES SERIES
Discharge: HOME | End: 2020-12-23
Payer: COMMERCIAL

## 2020-12-23 PROCEDURE — 97140 MANUAL THERAPY 1/> REGIONS: CPT

## 2020-12-23 NOTE — FLOWSHEET NOTE
[] [address]  [address]  [phone]  F: [phone] [] [address]  [facility]   [address]  P: [phone]  F: [phone] [x] [address]  [address] [facility]  P: [phone]  F: [phone] [] [address]  [facility]   [address]   [place]: [phone]  F: [phone]      Physical Therapy Daily Treatment Note    Date:  2020  Patient Name:  Pasquale Presume    :  1955  MRN: [id number]  Physician: Dr. Walton Carry: Medical [place] (unlimited visit)  Medical Diagnosis: Cervical and Lumbar pain, arm contusion                   Rehab Codes: M54.2, M54.5, S40.029A  Onset Date: 20               Next 's appt.: 20     Visit# / total visits:                  Cancels/No Shows: 0    Cancels/No Shows: 0/0    Subjective:    Pain:  [x] Yes  [] No Location: R sided neck, low back  Pain Rating: (0-10 scale) 6/10  Pain altered Tx:  [x] No  [] Yes  Action:  Comments:  Pt arrives with improved R sided neck pain, however still present.  Also noting greatly improved low back pain     Exercises:    Exercise Reps/ Time Weight/ Level Comments   SCIFIT/Nu step 6' L1.0 Stopped at 5' d/t increased Low back pain          PPT   Next    Seated lumbar flexion ball stretch 10x3\"     LTR 10x5     Supine piriformis stretch 3x10\"           Seated Mid back stretch 3x10\" hold      Seated UT stretch 3x30\"     Seated Leavtor stretch 3x30\"     Cervical AROM x  HEP   Doorway pec stretch 3x30\"     Seated scapular retraction/post sh rolls 2x10 each     Standing scap depression 5\"x10     Other: bolded only this date  Hot pack cervical seated 10'     Manual: Hypervolt to R piriformis, upper thoracic/lower cervical musculature  DI to R piriformis  Somatic dysfunctions: FRSR C4/C5, C5/C6- MET- not today d/t not needed      IDN to R UT (three points), C3/C4, C5/C6 R paravetebrals. homeostatic point: R spinal accessory. R sided superior cluneal points Homeostatic point R superior gluteal.            Specific Instructions for subsequent treatments: Continue with Dry Needling and stretching       Treatment Charges: Mins Units   [x]  Modalities HP 10 0   [x]  Ther Exercise 5 0   [x]  Manual Therapy 30 2   []  Ther Activities     []  Aquatics     []  Vasocompression     [x]  Other: Dry Needling 5 0   Total Treatment time 50 2         Assessment: [x] Progressing toward goals. Pt with improvement in pain and stiffness post manual and stretching. Will continue next visit. [] No change. [] Other:   [x] Patient would continue to benefit from skilled physical therapy services in order to: Patient would benefit from skilled physical therapy services in order to: Decrease low back pain, increase BLE strength and increase low back ROM, increase cervical ROM. STG: (to be met in 10 treatments)  1. ? Pain: Pt to have decreased low back pain to 4/10 Progressing  2. ? ROM:Pt to have improved lumbar extension to only 25% impaired MET  3. ? Strength: Pt to have BLE strength globally to 5/5- progressing, 4+/5  4. ? Function: Pt to report increased ability to walk without an increase in pain - MET  5. Patient to be independent with home exercise program as demonstrated by performance with correct form without cues. - MET     LTG: (to be met in 20 treatments)  1. Pt to have decreased low back pain to 1-2/10- Progressing, pt has some days with 2/10 pain, others with up to 8/10 low back pain  2. Pt to have full painfree AROM lumbar spine- Progressing, noted difficulty remains with lumbar extension  3. Pt to have full painfree AROM cervical spine (added 8/24)        Patient goals: To have no back pain     Pt.  Education:  [x] Yes  [] No  [] Reviewed Prior HEP/Ed  Method of Education: [x] Verbal  [x] Demo  [x] Written  Comprehension of Education:  [x] Verbalizes understanding. [x] Demonstrates understanding. [x] Needs review. [] Demonstrates/verbalizes HEP/Ed previously given. Plan: [x] Continue current frequency toward long and short term goals.           Time In: 1700          Time Out: 1750    Electronically signed by:  Sherri Hansen, PT

## 2020-12-30 ENCOUNTER — HOSPITAL ENCOUNTER (OUTPATIENT)
Dept: PHYSICAL THERAPY | Facility: CLINIC | Age: 65
Setting detail: THERAPIES SERIES
Discharge: HOME | End: 2020-12-30
Payer: COMMERCIAL

## 2020-12-30 PROCEDURE — 97140 MANUAL THERAPY 1/> REGIONS: CPT

## 2020-12-30 NOTE — FLOWSHEET NOTE
[] [address]  [address]  [phone]  F: [phone] [] [address]  [facility] 36   [address]  P: [phone]  F: [phone] [x] [address]  [address] [facility]  P: [phone]  F: [phone] [] [address]  [facility]   [address]   [place]: [phone]  F: [phone]      Physical Therapy Daily Treatment Note    Date:  2020  Patient Name:  Lucille Avilez    :  1955  MRN: [id number]  Physician: Dr. Aleksandra Sylvester: Medical [place] (unlimited visit)  Medical Diagnosis: Cervical and Lumbar pain, arm contusion                   Rehab Codes: M54.2, M54.5, S40.029A  Onset Date: 20               Next 's appt.: 20     Visit# / total visits:                  Cancels/No Shows: 0    Cancels/No Shows: 0/0    Subjective:    Pain:  [x] Yes  [] No Location: R sided neck, low back  Pain Rating: (0-10 scale) 4/10  Pain altered Tx:  [x] No  [] Yes  Action:  Comments:  Pt arrives with R sided neck and low back pain, largest concern being low back pain from working long hours this week.  Pt does state to having noticeable increased neck ROM and decreased stiffness overall     Exercises:    Exercise Reps/ Time Weight/ Level Comments   SCIFIT/Nu step 6' L1.0 Stopped at 5' d/t increased Low back pain          PPT   Next    Seated lumbar flexion ball stretch 10x3\"     LTR 10x5     Supine piriformis stretch 3x10\"           Seated Mid back stretch 3x10\" hold      Seated UT stretch 3x30\"     Seated Leavtor stretch 3x30\"     Cervical AROM x  HEP   Doorway pec stretch 3x30\"     Seated scapular retraction/post sh rolls 2x10 each     Standing scap depression 5\"x10     Other: bolded only this date  Hot pack cervical seated 10' Manual: Hypervolt to R piriformis, upper thoracic/lower cervical musculature  DI to R piriformis- not today   Somatic dysfunctions: FRSR C4/C5, C5/C6- MET- not today d/t not needed      IDN to R UT (three points), C3/C4, C5/C6 R paravetebrals. homeostatic point: R spinal accessory. R sided superior cluneal points Homeostatic point R superior gluteal.            Specific Instructions for subsequent treatments: Continue with Dry Needling and stretching       Treatment Charges: Mins Units   []  Modalities      [x]  Ther Exercise 5 0   [x]  Manual Therapy 25 2   []  Ther Activities     []  Aquatics     []  Vasocompression     [x]  Other: Dry Needling 5 0   Total Treatment time 35 2         Assessment: [x] Progressing toward goals. Pt with improvement in pain and stiffness post manual and stretching. Will continue next visit. [] No change. [] Other:   [x] Patient would continue to benefit from skilled physical therapy services in order to: Patient would benefit from skilled physical therapy services in order to: Decrease low back pain, increase BLE strength and increase low back ROM, increase cervical ROM. STG: (to be met in 10 treatments)  1. ? Pain: Pt to have decreased low back pain to 4/10 Progressing  2. ? ROM:Pt to have improved lumbar extension to only 25% impaired MET  3. ? Strength: Pt to have BLE strength globally to 5/5- progressing, 4+/5  4. ? Function: Pt to report increased ability to walk without an increase in pain - MET  5. Patient to be independent with home exercise program as demonstrated by performance with correct form without cues. - MET     LTG: (to be met in 20 treatments)  1. Pt to have decreased low back pain to 1-2/10- Progressing, pt has some days with 2/10 pain, others with up to 8/10 low back pain  2. Pt to have full painfree AROM lumbar spine- Progressing, noted difficulty remains with lumbar extension  3.  Pt to have full painfree AROM cervical spine (added 8/24)        Patient goals: To have no back pain     Pt. Education:  [x] Yes  [] No  [] Reviewed Prior HEP/Ed  Method of Education: [x] Verbal  [x] Demo  [x] Written  Comprehension of Education:  [x] Verbalizes understanding. [x] Demonstrates understanding. [x] Needs review. [] Demonstrates/verbalizes HEP/Ed previously given. Plan: [x] Continue current frequency toward long and short term goals.           Time In: 1700          Time Out: 6365    Electronically signed by:  Tomasa Paul, PT

## 2021-01-04 ENCOUNTER — HOSPITAL ENCOUNTER (OUTPATIENT)
Dept: PHYSICAL THERAPY | Facility: CLINIC | Age: 66
Setting detail: THERAPIES SERIES
Discharge: HOME | End: 2021-01-04
Payer: COMMERCIAL

## 2021-01-04 ENCOUNTER — TELEPHONE (OUTPATIENT)
Dept: FAMILY MEDICINE CLINIC | Age: 66
End: 2021-01-04

## 2021-01-04 DIAGNOSIS — Z11.52 ENCOUNTER FOR SCREENING FOR COVID-19: Primary | ICD-10-CM

## 2021-01-04 NOTE — TELEPHONE ENCOUNTER
Patient had Covid back in October, and patient recently had exposure and was sent home today. Patient just had another Covid test done today at the [facility] ([place]). Patient had headaches and fever from first test, and is now experiencing headaches again. Patient is requesting an antibody test. Patient works in the lab.

## 2021-01-05 ENCOUNTER — HOSPITAL ENCOUNTER (OUTPATIENT)
Age: 66
Discharge: HOME | End: 2021-01-05
Payer: COMMERCIAL

## 2021-01-05 DIAGNOSIS — R07.89 RIGHT-SIDED CHEST WALL PAIN: ICD-10-CM

## 2021-01-05 DIAGNOSIS — Z11.52 ENCOUNTER FOR SCREENING FOR COVID-19: ICD-10-CM

## 2021-01-05 DIAGNOSIS — N28.9 RENAL INSUFFICIENCY: ICD-10-CM

## 2021-01-05 LAB
ANION GAP SERPL CALCULATED.3IONS-SCNC: 8 MMOL/L (ref 9–17)
BUN BLDV-MCNC: 22 MG/DL (ref 8–23)
BUN/CREAT BLD: ABNORMAL (ref 9–20)
CALCIUM SERPL-MCNC: 9.5 MG/DL (ref 8.6–10.4)
CHLORIDE BLD-SCNC: 110 MMOL/L (ref 98–107)
CO2: 25 MMOL/L (ref 20–31)
CREAT SERPL-MCNC: 0.74 MG/DL (ref 0.5–0.9)
GFR AFRICAN AMERICAN: >60 ML/MIN
GFR NON-AFRICAN AMERICAN: >60 ML/MIN
GFR SERPL CREATININE-BSD FRML MDRD: ABNORMAL ML/MIN/{1.73_M2}
GFR SERPL CREATININE-BSD FRML MDRD: ABNORMAL ML/MIN/{1.73_M2}
GLUCOSE BLD-MCNC: 90 MG/DL (ref 70–99)
POTASSIUM SERPL-SCNC: 4.4 MMOL/L (ref 3.7–5.3)
SARS-COV-2 ANTIBODY, TOTAL: POSITIVE
SODIUM BLD-SCNC: 143 MMOL/L (ref 135–144)

## 2021-01-05 PROCEDURE — 80048 BASIC METABOLIC PNL TOTAL CA: CPT

## 2021-01-05 PROCEDURE — 86769 SARS-COV-2 COVID-19 ANTIBODY: CPT

## 2021-01-06 ENCOUNTER — HOSPITAL ENCOUNTER (OUTPATIENT)
Dept: PHYSICAL THERAPY | Facility: CLINIC | Age: 66
Setting detail: THERAPIES SERIES
Discharge: HOME | End: 2021-01-06
Payer: COMMERCIAL

## 2021-01-06 PROCEDURE — 97110 THERAPEUTIC EXERCISES: CPT

## 2021-01-06 PROCEDURE — 97140 MANUAL THERAPY 1/> REGIONS: CPT

## 2021-01-06 NOTE — FLOWSHEET NOTE
lumbar, upper thoracic/lower cervical musculature  DI to R piriformis- not today   Somatic dysfunctions: FRSR C4/C5, C5/C6- MET- not today d/t not needed      IDN to R UT (three points), C3/C4, C5/C6 R paravetebrals, L3/L4, L4/L5, L5/S1, R piriformis  homeostatic point: R spinal accessory. R sided superior cluneal points Homeostatic point R superior gluteal.            Specific Instructions for subsequent treatments: Continue with Dry Needling and stretching       Treatment Charges: Mins Units   []  Modalities      [x]  Ther Exercise 10 1   [x]  Manual Therapy 25 2   []  Ther Activities     []  Aquatics     []  Vasocompression     [x]  Other: Dry Needling 5 0   Total Treatment time 40 3         Assessment: [x] Progressing toward goals. Pt noting continued improvement in pain since restarting therapy, only arrived with low back pain this date which was greatly decreased after manual. Will continue with manual as ntoed above. [] No change. [] Other:   [x] Patient would continue to benefit from skilled physical therapy services in order to: Patient would benefit from skilled physical therapy services in order to: Decrease low back pain, increase BLE strength and increase low back ROM, increase cervical ROM. STG: (to be met in 10 treatments)  1. ? Pain: Pt to have decreased low back pain to 4/10 Progressing  2. ? ROM:Pt to have improved lumbar extension to only 25% impaired MET  3. ? Strength: Pt to have BLE strength globally to 5/5- progressing, 4+/5  4. ? Function: Pt to report increased ability to walk without an increase in pain - MET  5. Patient to be independent with home exercise program as demonstrated by performance with correct form without cues. - MET     LTG: (to be met in 20 treatments)  1. Pt to have decreased low back pain to 1-2/10- Progressing, pt has some days with 2/10 pain, others with up to 8/10 low back pain  2.  Pt to have full painfree AROM lumbar spine- Progressing, noted difficulty remains with lumbar extension  3. Pt to have full painfree AROM cervical spine (added 8/24)        Patient goals: To have no back pain     Pt. Education:  [x] Yes  [] No  [] Reviewed Prior HEP/Ed  Method of Education: [x] Verbal  [x] Demo  [x] Written  Comprehension of Education:  [x] Verbalizes understanding. [x] Demonstrates understanding. [x] Needs review. [] Demonstrates/verbalizes HEP/Ed previously given. Plan: [x] Continue current frequency toward long and short term goals.           Time In: 1650          Time Out: 2012    Electronically signed by:  True Mcdonald, PT

## 2021-01-11 ENCOUNTER — HOSPITAL ENCOUNTER (OUTPATIENT)
Dept: PHYSICAL THERAPY | Facility: CLINIC | Age: 66
Setting detail: THERAPIES SERIES
Discharge: HOME | End: 2021-01-11
Payer: COMMERCIAL

## 2021-01-11 PROCEDURE — 97140 MANUAL THERAPY 1/> REGIONS: CPT

## 2021-01-11 PROCEDURE — 97110 THERAPEUTIC EXERCISES: CPT

## 2021-01-11 NOTE — FLOWSHEET NOTE
[] [facility]) - [facility] &  Therapy  [address]  [phone]  F: [phone] [] [address]  [facility]   [address]  P: [phone]  F: [phone] [x] [address] &  Therapy  [facility]  P: [phone]  F: [phone] [] [address]  [facility]   [address]   [place]: [phone]  F: [phone]      Physical Therapy Daily Treatment Note    Date:  2021  Patient Name:  Lidia Hickey    :  1955  MRN: [id number]  Physician: Dr. Amor Romp: Medical [place] (unlimited visit)  Medical Diagnosis: Cervical and Lumbar pain, arm contusion                   Rehab Codes: M54.2, M54.5, S40.029A  Onset Date: 20               Next 's appt.: 20     Visit# / total visits:                  Cancels/No Shows: 0    Cancels/No Shows: 2/    Subjective:    Pain:  [x] Yes  [] No Location: R sided low back  Pain Rating: (0-10 scale) 7/10  Pain altered Tx:  [x] No  [] Yes  Action:  Comments:  Pt arrives with increased R hip/low back pain, states it started yesterday and is unsure of cause. Also has some R sided neck tightness that pt states is due to work.      Exercises:    Exercise Reps/ Time Weight/ Level Comments   SCIFIT/Nu step 6' L1.0 Stopped at 5' d/t increased Low back pain          PPT   Next    Seated lumbar flexion ball stretch 10x3\"     LTR 10x5     Supine piriformis stretch 3x10\"           Seated Mid back stretch 3x10\" hold      Seated UT stretch 3x30\"     Seated Leavtor stretch 3x30\"     Cervical AROM x  HEP   Doorway pec stretch 3x30\"     Seated scapular retraction/post sh rolls 2x10 each     Standing scap depression 5\"x10     Other: bolded only this date  Hot pack cervical seated 10' - not today     Manual: Hypervolt to R piriformis, lumbar, upper thoracic/lower cervical musculature  DI to R piriformis- not today   Somatic dysfunctions: FRSR C4/C5, C5/C6- MET- not today d/t not needed      IDN to R UT (three points), C3/C4, C5/C6 R paravetebrals, L3/L4, L4/L5, L5/S1, R piriformis  homeostatic point: R spinal accessory. R sided superior cluneal points Homeostatic point R superior gluteal.            Specific Instructions for subsequent treatments: Continue with Dry Needling and stretching . Possible DC next visit, address goals       Treatment Charges: Mins Units   []  Modalities      [x]  Ther Exercise 10 1   [x]  Manual Therapy 25 2   []  Ther Activities     []  Aquatics     []  Vasocompression     [x]  Other: Dry Needling 5 0   Total Treatment time 40 3         Assessment: [x] Progressing toward goals. Pt continuing to note great improvement post manual with Dry Needling this date. Discussed decreasing to one time per week d/t pt's continued improvement with therapy, pt in agreement. Possible DC next visit. [] No change. [] Other:   [x] Patient would continue to benefit from skilled physical therapy services in order to: Patient would benefit from skilled physical therapy services in order to: Decrease low back pain, increase BLE strength and increase low back ROM, increase cervical ROM. STG: (to be met in 10 treatments)  1. ? Pain: Pt to have decreased low back pain to 4/10 Progressing, met at times  2. ? ROM:Pt to have improved lumbar extension to only 25% impaired MET  3. ? Strength: Pt to have BLE strength globally to 5/5- progressing, 4+/5  4. ? Function: Pt to report increased ability to walk without an increase in pain - MET  5. Patient to be independent with home exercise program as demonstrated by performance with correct form without cues. - MET     LTG: (to be met in 20 treatments)  1.  Pt to have decreased low back pain to 1-2/10- Progressing, pt has some days with 2/10 pain, others with up to 8/10 low back pain  2. Pt to have full painfree AROM lumbar spine- Progressing, noted difficulty remains with lumbar extension  3. Pt to have full painfree AROM cervical spine (added 8/24)        Patient goals: To have no back pain     Pt. Education:  [x] Yes  [] No  [] Reviewed Prior HEP/Ed  Method of Education: [x] Verbal  [x] Demo  [x] Written  Comprehension of Education:  [x] Verbalizes understanding. [x] Demonstrates understanding. [x] Needs review. [] Demonstrates/verbalizes HEP/Ed previously given. Plan: [x] Continue current frequency toward long and short term goals.           Time In: 1700          Time Out: 1740    Electronically signed by:  Starr Mcmahan, PT

## 2021-01-13 ENCOUNTER — HOSPITAL ENCOUNTER (OUTPATIENT)
Dept: PHYSICAL THERAPY | Facility: CLINIC | Age: 66
Setting detail: THERAPIES SERIES
Discharge: HOME | End: 2021-01-13
Payer: COMMERCIAL

## 2021-01-13 PROCEDURE — 97140 MANUAL THERAPY 1/> REGIONS: CPT

## 2021-01-13 NOTE — FLOWSHEET NOTE
with 2/10 pain, others with up to 8/10 low back pain  2. Pt to have full painfree AROM lumbar spine- Progressing, noted difficulty remains with lumbar extension  3. Pt to have full painfree AROM cervical spine (added 8/24)        Patient goals: To have no back pain     Pt. Education:  [x] Yes  [] No  [] Reviewed Prior HEP/Ed  Method of Education: [x] Verbal  [x] Demo  [x] Written  Comprehension of Education:  [x] Verbalizes understanding. [x] Demonstrates understanding. [x] Needs review. [] Demonstrates/verbalizes HEP/Ed previously given. Plan: [x] Continue current frequency toward long and short term goals.           Time In: 1725          Time Out: 1750    Electronically signed by:  Lawanda Carter PT

## 2021-01-20 ENCOUNTER — HOSPITAL ENCOUNTER (OUTPATIENT)
Dept: PHYSICAL THERAPY | Facility: CLINIC | Age: 66
Setting detail: THERAPIES SERIES
Discharge: HOME | End: 2021-01-20
Payer: COMMERCIAL

## 2021-01-20 NOTE — FLOWSHEET NOTE
[] [facility]) - [facility] &  Therapy  [address]    [phone]  F: [phone]   [] [address] [facility]   [address]  P: [phone]  F: [phone]  [] [address] &  Therapy  [address] [facility]  P: [phone]  F: [phone] [] [address]  P: [phone]  F: [phone]  [] [address]  [facility]   [address]   [place]: [phone]  F: [phone]   [] [address]  [place]: [phone]   F: [phone]     Physical Therapy Cancel/No Show note    Date: 2021  Patient: Kary Mays  : 1955  MRN: [id number]    Cancels/No Shows to date:     For today's appointment patient:    []  Cancelled    [] Rescheduled appointment    [x] No-show     Reason given by patient:    []  Patient ill    []  Conflicting appointment    [] No transportation      [x] Conflict with work    [] No reason given    [] Weather related    [] COVID-19    [x] Other:      Comments: Pt reports was stuck at work, rescheduled for next week.          [] Next appointment was confirmed    Electronically signed by: Eda Carrillo, PT

## 2021-01-25 ENCOUNTER — HOSPITAL ENCOUNTER (OUTPATIENT)
Dept: PHYSICAL THERAPY | Facility: CLINIC | Age: 66
Setting detail: THERAPIES SERIES
Discharge: HOME | End: 2021-01-25
Payer: COMMERCIAL

## 2021-01-25 PROCEDURE — 97140 MANUAL THERAPY 1/> REGIONS: CPT

## 2021-02-01 ENCOUNTER — HOSPITAL ENCOUNTER (OUTPATIENT)
Dept: PHYSICAL THERAPY | Facility: CLINIC | Age: 66
Setting detail: THERAPIES SERIES
Discharge: HOME | End: 2021-02-01
Payer: COMMERCIAL

## 2021-02-01 PROCEDURE — 97140 MANUAL THERAPY 1/> REGIONS: CPT

## 2021-02-01 NOTE — FLOWSHEET NOTE
[] [facility]) - [facility]  [facility]  [address]  [phone]  F: [phone] [] [address]  [facility]   [address]  P: [phone]  F: [phone] [x] [address] &  Therapy  [address] [facility]  P: [phone]  F: [phone] [] [address]  Psychiatric   [address]   [place]: [phone]  F: [phone]      Physical Therapy Daily Treatment Note    Date:  2021  Patient Name:  Mcneil Simmonds    :  1955  MRN: [id number]  Physician: Dr. Christy Garcia: Medical [place] (unlimited visit)  Medical Diagnosis: Cervical and Lumbar pain, arm contusion                   Rehab Codes: M54.2, M54.5, S40.029A  Onset Date: 20               Next 's appt.: 20     Visit# / total visits: 30/30                 Cancels/No Shows: 0    Cancels/No Shows: 2/2    Subjective:    Pain:  [x] Yes  [] No Location: R sided neck  Pain Rating: (0-10 scale) 4/10  Pain altered Tx:  [x] No  [] Yes  Action:  Comments:  Pt arrives stating to having decreased low back pain, is noticing neck pain right now. Agreeable to be discharged this date d/t significant improvement overall.          Exercises:    Exercise Reps/ Time Weight/ Level Comments   SCIFIT/Nu step 6' L1.0 Stopped at 5' d/t increased Low back pain          PPT   Next    Seated lumbar flexion ball stretch 10x3\"     LTR 10x5     Supine piriformis stretch 3x10\"           Seated Mid back stretch 3x10\" hold      Seated UT stretch 3x30\"     Seated Leavtor stretch 3x30\"     Cervical AROM x  HEP   Doorway pec stretch 3x30\"     Seated scapular retraction/post sh rolls 2x10 each     Standing scap depression 5\"x10     Other: No exercises this date, focused on manual listed below   Hot pack cervical seated 10' - not today Manual: MFR to RUT and R levator, gentle DI to R piriformis        IDN to R UT (three points)  L3/L4, L4/L5, L5/S1,  R piriformis  homeostatic point: R spinal accessory. R sided superior cluneal points            Specific Instructions for subsequent treatments: Discharge this date       Treatment Charges: Mins Units   []  Modalities      []  Ther Exercise     [x]  Manual Therapy 30 2   []  Ther Activities     []  Aquatics     []  Vasocompression     [x]  Other: Dry Needling 5 0   Total Treatment time 35 2         Assessment: [x] Progressing toward goals. Pt with improvement in R sided cervical pain/tightenss post manual this date. Agreeable to be discharged. [] No change. [] Other:   [x] Patient would continue to benefit from skilled physical therapy services in order to: Patient would benefit from skilled physical therapy services in order to: Decrease low back pain, increase BLE strength and increase low back ROM, increase cervical ROM. STG: (to be met in 10 treatments)  1. ? Pain: Pt to have decreased low back pain to 4/10 Progressing, met at times- MET for back, met intermittently for neck   2. ? ROM:Pt to have improved lumbar extension to only 25% impaired MET  3. ? Strength: Pt to have BLE strength globally to 5/5- progressing, 4+/5, progressing   4. ? Function: Pt to report increased ability to walk without an increase in pain - MET  5. Patient to be independent with home exercise program as demonstrated by performance with correct form without cues. - MET     LTG: (to be met in 20 treatments)  1. Pt to have decreased low back pain to 1-2/10- Progressing, pt has some days with 2/10 pain, others with up to 8/10 low back pain, (2/1) MET for back, met intermittently for neck   2. Pt to have full painfree AROM lumbar spine- Progressing, noted difficulty remains with lumbar extension, MET  3.  Pt to have full painfree AROM cervical spine (added 8/24) MET        Patient goals: To have no back pain     Pt. Education:  [x] Yes  [] No  [] Reviewed Prior HEP/Ed  Method of Education: [x] Verbal  [x] Demo  [x] Written  Comprehension of Education:  [x] Verbalizes understanding. [x] Demonstrates understanding. [x] Needs review. [] Demonstrates/verbalizes HEP/Ed previously given. Plan: [x] Continue current frequency toward long and short term goals.           Time In: 1730          Time Out: 1800    Electronically signed by:  Luis Miguel Quinonez, PT

## 2021-02-05 ENCOUNTER — VIRTUAL VISIT (OUTPATIENT)
Dept: FAMILY MEDICINE CLINIC | Age: 66
End: 2021-02-05
Payer: COMMERCIAL

## 2021-02-05 DIAGNOSIS — M48.061 SPINAL STENOSIS OF LUMBAR REGION, UNSPECIFIED WHETHER NEUROGENIC CLAUDICATION PRESENT: Primary | ICD-10-CM

## 2021-02-05 DIAGNOSIS — G89.29 CHRONIC BILATERAL LOW BACK PAIN WITHOUT SCIATICA: ICD-10-CM

## 2021-02-05 DIAGNOSIS — M54.50 CHRONIC BILATERAL LOW BACK PAIN WITHOUT SCIATICA: ICD-10-CM

## 2021-02-05 DIAGNOSIS — M54.2 CERVICALGIA: ICD-10-CM

## 2021-02-05 PROCEDURE — 99213 OFFICE O/P EST LOW 20 MIN: CPT | Performed by: STUDENT IN AN ORGANIZED HEALTH CARE EDUCATION/TRAINING PROGRAM

## 2021-02-05 RX ORDER — IBUPROFEN 800 MG/1
800 TABLET ORAL EVERY 8 HOURS PRN
Qty: 90 TABLET | Refills: 2 | Status: SHIPPED | OUTPATIENT
Start: 2021-02-05 | End: 2022-06-08

## 2021-02-05 ASSESSMENT — PATIENT HEALTH QUESTIONNAIRE - PHQ9
1. LITTLE INTEREST OR PLEASURE IN DOING THINGS: 0
2. FEELING DOWN, DEPRESSED OR HOPELESS: 0
SUM OF ALL RESPONSES TO PHQ QUESTIONS 1-9: 0
SUM OF ALL RESPONSES TO PHQ QUESTIONS 1-9: 0
SUM OF ALL RESPONSES TO PHQ9 QUESTIONS 1 & 2: 0
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
## 2021-02-05 NOTE — PROGRESS NOTES
Attending Physician Statement  I have discussed the care of Shanna Benitez 72 y.o. female, including pertinent history and exam findings, with the resident Dr. Kaelyn Chaves MD.    History and Exam:   Chief Complaint   Patient presents with    Follow-up     PT follow up, last PT 02/03/2021     Past Medical History:   Diagnosis Date    Constipation     Diverticulitis     Diverticulosis 2009    Diverticulosis     Fibroids     GERD (gastroesophageal reflux disease)     Glaucoma 2011    Hay fever     Hiatal hernia     Irritable bowel disease     Kidney infection     Lumbar stenosis     MVP (mitral valve prolapse)      Allergies   Allergen Reactions    Cat Hair Extract Anaphylaxis and Swelling    Codeine     Eggs Or Egg-Derived Products [Eggs Or Egg-Derived Products] Diarrhea      I have seen and examined the patient and the key elements of the encounter have been performed by me. BP Readings from Last 3 Encounters:   09/17/20 122/71   08/03/20 120/72   07/30/20 128/70     LMP 04/05/2009   Lab Results   Component Value Date    WBC 4.9 06/25/2016    HGB 12.5 06/25/2016    HCT 39.2 06/25/2016     06/25/2016    CHOL 206 (H) 07/16/2019    TRIG 74 07/16/2019    HDL 80 07/16/2019    ALT 19 06/25/2016    AST 17 06/25/2016     01/05/2021    K 4.4 01/05/2021     (H) 01/05/2021    CREATININE 0.74 01/05/2021    BUN 22 01/05/2021    CO2 25 01/05/2021    TSH 1.81 12/04/2015    INR 1.1 06/25/2016    LABA1C 6.1 09/17/2020     Lab Results   Component Value Date    LABALBU 4.3 06/25/2016     Lab Results   Component Value Date    FERRITIN 62 04/08/2012     Lab Results   Component Value Date    LDLCHOLESTEROL 111 07/16/2019     I agree with the assessment, plan and the diagnosis of    Diagnosis Orders   1.  Spinal stenosis of lumbar region, unspecified whether neurogenic claudication present  diclofenac sodium (VOLTAREN) 1 % GEL    ibuprofen (ADVIL;MOTRIN) 800 MG tablet 2. Cervicalgia  XR CERVICAL SPINE (4-5 VIEWS)   3. Chronic bilateral low back pain without sciatica  XR LUMBAR SPINE (MIN 4 VIEWS)    . I agree with orders as documented by the resident. Recommendations:  Chronic neck and low back pain- PT competed, continues with pain  Imaging ordered  Voltaren gel and ibuprofen PRN  Follows with Chiropractor  Follow up in 4 weeks    More than 25 minutes spent  in face to face encounter with the patient and more than half in counseling. Patient's questions were answered. Patient Voiced understanding to the counseling. Return in about 4 weeks (around 3/5/2021), or follow up.    (GC Modifier)-Dr. Radha Bennett MD

## 2021-02-05 NOTE — PROGRESS NOTES
Visit Information    Have you changed or started any medications since your last visit including any over-the-counter medicines, vitamins, or herbal medicines? no   Have you stopped taking any of your medications? Is so, why? -  no  Are you having any side effects from any of your medications? - no    Have you seen any other physician or provider since your last visit? yes - PT    Have you had any other diagnostic tests since your last visit?  no   Have you been seen in the emergency room and/or had an admission in a hospital since we last saw you?  no   Have you had your routine dental cleaning in the past 6 months?  no     Do you have an active MyChart account? If no, what is the barrier?   Yes    Patient Care Team:  Beatris Mortimer,  as PCP - [address] [place] [address], DO as PCP - [facility] Provider    Medical History Review  Past Medical, Family, and Social History reviewed and does contribute to the patient presenting condition    Health Maintenance   Topic Date Due    Hepatitis C screen  1955    HIV screen  09/25/1970    DTaP/Tdap/Td vaccine (1 - Tdap) 09/25/1974    Shingles Vaccine (1 of 2) 09/25/2005    DEXA (modify frequency per FRAX score)  09/25/2010    Flu vaccine (1) 09/01/2020    Pneumococcal 65+ years Vaccine (1 of 1 - PPSV23) 09/25/2020    Breast cancer screen  06/09/2021    A1C test (Diabetic or Prediabetic)  09/17/2021    Colon cancer screen colonoscopy  04/08/2023    Lipid screen  07/16/2024    Hepatitis A vaccine  Aged Out    Hepatitis B vaccine  Aged Out    Hib vaccine  Aged Out    Meningococcal (ACWY) vaccine  Aged Out

## 2021-03-11 ENCOUNTER — TELEPHONE (OUTPATIENT)
Dept: FAMILY MEDICINE CLINIC | Age: 66
End: 2021-03-11

## 2021-03-11 NOTE — TELEPHONE ENCOUNTER
Writer was able to reschedule her missed appointment from 03/10/21. Patient requested a virtual visit .  Next appointment 03/16/21

## 2021-03-16 ENCOUNTER — VIRTUAL VISIT (OUTPATIENT)
Dept: FAMILY MEDICINE CLINIC | Age: 66
End: 2021-03-16
Payer: COMMERCIAL

## 2021-03-16 DIAGNOSIS — M16.11 PRIMARY OSTEOARTHRITIS OF RIGHT HIP: Primary | ICD-10-CM

## 2021-03-16 RX ORDER — ALBUTEROL SULFATE 90 UG/1
2 AEROSOL, METERED RESPIRATORY (INHALATION) EVERY 6 HOURS PRN
Qty: 1 INHALER | Refills: 1 | Status: SHIPPED | OUTPATIENT
Start: 2021-03-16

## 2021-03-16 RX ORDER — EPINEPHRINE 0.3 MG/.3ML
0.3 INJECTION SUBCUTANEOUS ONCE
Qty: 0.3 ML | Refills: 0 | Status: SHIPPED | OUTPATIENT
Start: 2021-03-16 | End: 2022-06-22

## 2021-03-16 NOTE — PROGRESS NOTES
Attending Physician Statement  I have discussed the care of ReginaDowningincluding pertinent history and exam findings,  with the resident. I have reviewed the key elements of all parts of the encounter with the resident. I agree with the assessment, plan and orders as documented by the resident.   (GE Modifier)    Chronic R groin Pain- worked up Suspect DJD R hip - see ortho

## 2021-03-16 NOTE — PROGRESS NOTES
[facility] Medicine Residency Program - Virtual Visit        Bina Gerardo is a 72 y.o. female evaluated via telephone on 3/16/2021. Consent:  She and/or health care decision maker is aware that that she may receive a bill for this telephone service, depending on her insurance coverage, and has provided verbal consent to proceed: Yes      Documentation:  I communicated with the patient and/or health care decision maker about right-sided groin/hip pain. Patient has been having this pain for greater than 1 year. Patient has had extensive work-up including vascular studies and ultrasound to check for hernias. Patient is following with vascular surgery and general surgery so far no interventions have been planned and no definitive diagnosis has been made. Patient describes a pain in the right anterior thigh/groin. Patient states that he makes ambulating difficult. MRI was done in February which showed degenerative osteoarthritis of bilateral hips. .   Details of this discussion including any medical advice provided:     ASSESSMENT/PLAN:    1. Primary osteoarthritis of right hip  -Patient would benefit from visit to orthopedic surgery, patient symptoms resemble pain radiating from the hip.   We will follow-up in 1 month  - Torri Yeager DO, Orthopedic Surgery, [facility]          Requested Prescriptions     Signed Prescriptions Disp Refills    EPINEPHrine (EPIPEN 2-ANSON) 0.3 MG/0.3ML SOAJ injection 0.3 mL 0     Sig: Inject 0.3 mLs into the muscle once for 1 dose Use as directed for allergic reaction    albuterol sulfate  (90 Base) MCG/ACT inhaler 1 Inhaler 1     Sig: Inhale 2 puffs into the lungs every 6 hours as needed for Wheezing       Medications Discontinued During This Encounter   Medication Reason    albuterol (PROVENTIL HFA;VENTOLIN HFA) 108 (90 BASE) MCG/ACT inhaler REORDER    EPINEPHrine (EPIPEN 2-ANSON) 0.3 MG/0.3ML SOAJ injection REORDER       Return in about 4 weeks (around 4/13/2021) for hip pain. I affirm this is a Patient Initiated Episode with a Patient who has not had a related appointment within my department in the past 7 days or scheduled within the next 24 hours. Patient identification was verified at the start of the visit: Yes    Total Time: minutes: 11-20 minutes    Note: not billable if this call serves to triage the patient into an appointment for the relevant concern      Rony Arnold.  Panda Pérez MD  Family Medicine PGY-2  03/16/21 at 8:43 AM

## 2021-03-16 NOTE — PROGRESS NOTES
Visit Information    Have you changed or started any medications since your last visit including any over-the-counter medicines, vitamins, or herbal medicines? no   Are you having any side effects from any of your medications? -  no  Have you stopped taking any of your medications? Is so, why? -  no    Have you seen any other physician or provider since your last visit? No  Have you had any other diagnostic tests since your last visit? No  Have you been seen in the emergency room and/or had an admission to a hospital since we last saw you? No  Have you had your routine dental cleaning in the past 6 months? no    Have you activated your [employer] account? If not, what are your barriers?  Yes     Patient Care Team:  Carissa Cruz,  as PCP - [address] [place] [address],  as PCP - [facility] Provider    Medical History Review  Past Medical, Family, and Social History reviewed and does not contribute to the patient presenting condition    Health Maintenance   Topic Date Due    Hepatitis C screen  Never done    HIV screen  Never done    DTaP/Tdap/Td vaccine (1 - Tdap) Never done    Shingles Vaccine (1 of 2) Never done    DEXA (modify frequency per FRAX score)  Never done    Flu vaccine (1) Never done    Pneumococcal 65+ years Vaccine (1 of 1 - PPSV23) Never done    Breast cancer screen  06/09/2021    A1C test (Diabetic or Prediabetic)  09/17/2021    Colon cancer screen colonoscopy  04/08/2023    Lipid screen  07/16/2024    COVID-19 Vaccine  Completed    Hepatitis A vaccine  Aged Out    Hepatitis B vaccine  Aged Out    Hib vaccine  Aged Out    Meningococcal (ACWY) vaccine  Aged Out

## 2021-03-29 ENCOUNTER — TELEPHONE (OUTPATIENT)
Dept: FAMILY MEDICINE CLINIC | Age: 66
End: 2021-03-29

## 2021-03-31 DIAGNOSIS — M25.559 HIP PAIN: Primary | ICD-10-CM

## 2021-04-02 ENCOUNTER — OFFICE VISIT (OUTPATIENT)
Dept: ORTHOPEDIC SURGERY | Age: 66
End: 2021-04-02
Payer: COMMERCIAL

## 2021-04-02 VITALS — BODY MASS INDEX: 29.78 KG/M2 | WEIGHT: 174.4 LBS | HEIGHT: 64 IN

## 2021-04-02 DIAGNOSIS — M25.551 RIGHT HIP PAIN: Primary | ICD-10-CM

## 2021-04-02 PROCEDURE — 99244 OFF/OP CNSLTJ NEW/EST MOD 40: CPT | Performed by: ORTHOPAEDIC SURGERY

## 2021-04-02 ASSESSMENT — ENCOUNTER SYMPTOMS
COUGH: 0
APNEA: 0
VOMITING: 0
ABDOMINAL PAIN: 0
CHEST TIGHTNESS: 0

## 2021-04-02 NOTE — PROGRESS NOTES
[facility]  [facility]  [address] [facility] [address]  Dept: [phone]    Ambulatory Orthopedic Consult      CHIEF COMPLAINT:    Chief Complaint   Patient presents with    Hip Pain     right        HISTORY OF PRESENT ILLNESS:      The patient is a 72 y.o. female who is being seen at the request of  Julio Buchanan MD for consultation and evaluation of  Right hip pain. Dany Garcia presents with a 6 years history of pain in the right hip. The pain does radiate into the thigh and into the groin. The pain is   made worse with weightbearing. The pain is  worse at night. The pain is  worse when laying on the affected side. The pain is made better with resting and activity modification. Patient states that she had a bad fall in 2015 which caused her to have 2 ankle surgeries. She notes pain since the fall as well. The patient has seen vascular to rule out blood clot and then a hernia was ruled out as well. Patient had an MRI of the pelvis in 2020 as well as an ultrasound of the pelvis. Past Medical History:    Past Medical History:   Diagnosis Date    Constipation     Diverticulitis     Diverticulosis 2009    Diverticulosis     Fibroids     GERD (gastroesophageal reflux disease)     Glaucoma 2011    Hay fever     Hiatal hernia     Irritable bowel disease     Kidney infection     Lumbar stenosis     MVP (mitral valve prolapse)        Past Surgical History:    Past Surgical History:   Procedure Laterality Date    COLONOSCOPY      FOOT SURGERY Left     HYSTERECTOMY      RSO,LS, previously had tubal with LO    LYMPH NODE BIOPSY Right 11/17/2010    axilla    OVARY SURGERY  1990's    mass and ovary removed    ABDIEL AND BSO  04/02/2009    Extended abdominal hyst w/rt salpingo-ooph, lt salpingectomy. lysis of extensive small and large intestinal adhesions. right and left ureterolysis. lysis of pelvic adhesions with retroperitoneal exploration. procurement peritoneal cytology.  TONSILLECTOMY AND ADENOIDECTOMY  1973    TUMOR REMOVAL      left foot       Current Medications:   Current Outpatient Medications   Medication Sig Dispense Refill    albuterol sulfate  (90 Base) MCG/ACT inhaler Inhale 2 puffs into the lungs every 6 hours as needed for Wheezing 1 Inhaler 1    diclofenac sodium (VOLTAREN) 1 % GEL Apply 4 g topically 4 times daily as needed for Pain 150 g 0    ibuprofen (ADVIL;MOTRIN) 800 MG tablet Take 1 tablet by mouth every 8 hours as needed for Pain 90 tablet 2    mirabegron (MYRBETRIQ) 50 MG TB24 Take 50 mg by mouth daily      gabapentin (NEURONTIN) 300 MG capsule TAKE 1 OR 2 CAPSULES BY MOUTH AT BEDTIME  0    cetirizine (ZYRTEC) 10 MG tablet Take 10 mg by mouth daily.  Multiple Vitamin (MULTI-VITAMIN DAILY PO) Take  by mouth.  Mometasone Furoate (NASONEX NA) by Nasal route.  LUMIGAN 0.01 % SOLN Place 1 drop into both eyes nightly.  EPINEPHrine (EPIPEN 2-ANSON) 0.3 MG/0.3ML SOAJ injection Inject 0.3 mLs into the muscle once for 1 dose Use as directed for allergic reaction 0.3 mL 0     No current facility-administered medications for this visit.         Allergies:    Cat hair extract, Codeine, and Eggs or egg-derived products [eggs or egg-derived products]    Social History:   Social History     Socioeconomic History    Marital status:      Spouse name: Not on file    Number of children: Not on file    Years of education: Not on file    Highest education level: Not on file   Occupational History    Not on file   Social Needs    Financial resource strain: Not on file    Food insecurity     Worry: Not on file     Inability: Not on file   [employer] needs     Medical: Not on file     Non-medical: Not on file   Tobacco Use    Smoking status: Never Smoker    Smokeless tobacco: Never Used   Substance and Sexual Activity    Alcohol use: Not Currently     Comment: socially    Drug use: No    Sexual activity: Not Currently     Partners: Male   Lifestyle    Physical activity     Days per week: Not on file     Minutes per session: Not on file    Stress: Not on file   Relationships    Social connections     Talks on phone: Not on file     Gets together: Not on file     Attends [employer] service: Not on file     Active member of club or organization: Not on file     Attends meetings of clubs or organizations: Not on file     Relationship status: Not on file    Intimate partner violence     Fear of current or ex partner: Not on file     Emotionally abused: Not on file     Physically abused: Not on file     Forced sexual activity: Not on file   Other Topics Concern    Not on file   Social History Narrative    Not on file       Family History:  Family History   Problem Relation Age of Onset    Breast Cancer Sister     Diabetes Sister     Diabetes Mother     Hypertension Mother     Tuberculosis Mother     Obesity Mother     Heart Disease Father     Arthritis Father     Diabetes Sister     Diabetes Sister     Colon Cancer Maternal Grandmother     Stroke Other     Kidney Disease Other          REVIEW OF SYSTEMS:  Review of Systems   Constitutional: Negative for chills and fever. Respiratory: Negative for apnea, cough and chest tightness. Cardiovascular: Negative for chest pain and palpitations. Gastrointestinal: Negative for abdominal pain and vomiting. Genitourinary: Negative for difficulty urinating. Musculoskeletal: Positive for arthralgias (right hip). Negative for gait problem, joint swelling and myalgias. Neurological: Negative for dizziness, weakness and numbness. I have reviewed the CC, HPI, ROS, PMH, FHX, Social History, and if not present in this note, I have reviewed in the patient's chart. I agree with the documentation provided by other staff and have reviewed their documentation prior to providing my signature indicating agreement.     PHYSICAL EXAM:  Ht 5' 4\" (1.626 m)   Wt 174 lb 6.4 oz (79.1 kg)   LMP 04/05/2009   BMI 29.94 kg/m²  Body mass index is 29.94 kg/m². Physical Exam  Gen: alert and oriented to person and place. Psych:  Appropriate affect; Appropriate knowledge base; Appropriate mood; No hallucinations; Head: normocephalic, atraumatic   Chest: symmetric chest excursion; nonlabored respiratory effort. Pelvis: stable; no obvious pelvis deformity  Ortho Exam  Extremity: No outward deformity of the bilateral hips or lumbosacral spine is appreciated. Patient has full range of motion lumbosacral spine. Mildly positive hip logroll on the right is appreciated. A grossly positive FADIR maneuver is positive to the right hip. No evidence of neurologic insult to the bilateral lower extremities is appreciated and no nerve tension signs to the bilateral lower extremities are present. The patient has full range of motion of the bilateral knees. Motor, sensory, vascular examination of bilateral lower extremities is grossly intact without focal deficits. Radiology:     Xr Hip Right (2-3 Views)    Result Date: 4/2/2021  History: Right hip pain Findings: Low AP pelvis and frog-leg lateral x-ray of the right hip done in the office today shows mild cystic changes noted along the superior lateral femoral head and superior lateral acetabulum. No further evidence of fracture, subluxation, dislocation, radiopaque foreign body, radiopaque tumor, significant degenerative changes are appreciated. Impression: Mild degenerative changes right hip as described above. ASSESSMENT:     1. Right hip pain         PLAN:       Reviewed x-rays with the patient today in the office. Discussed etiology and natural history of possible labral tear of the right hip. The treatment options may include oral anti-inflammatories, bracing, injections, advanced imaging, activity modification, physical therapy and/or surgical intervention.  I would like to do an MRI arthrogram of the right hip to rule out a labral tear. Discussed corticosteroid injection to the right hip under fluroscopy in the future. The patient would like to proceed with MRI arthrogram.  The patient will follow up in the office after MRI. We discussed that the patient should call us with any concerns or questions. Return after MRI. No orders of the defined types were placed in this encounter. Orders Placed This Encounter   Procedures    MRI HIP RIGHT W WO CONTRAST     Standing Status:   Future     Standing Expiration Date:   4/2/2022     Scheduling Instructions:      MRI arthrogram right hip     Order Specific Question:   Reason for exam:     Answer:   rule out tear    IR INJ ARTHROGRAM HIP RIGHT     Standing Status:   Future     Standing Expiration Date:   4/2/2022     Scheduling Instructions:      MRI arthrogram right hip     I, Lul Matt LPN am scribing for and in the presence of Dr. Alka Rios  4/4/2021 5:48 PM      I have reviewed and made changes accordingly to the work scribed by Lul Matt LPN. The documentation accurately reflects work and decisions made by me. I have also reviewed documentation completed by clinical staff.     Alka Rios DO, [facility]  4/4/2021 5:49 PM    This note is created with the assistance of a speech recognition program.  While intending to generate a document that actually reflects the content of the visit, the document can still have some errors including those of syntax and sound a like substitutions which may escape proof reading.  In such instances, actual meaning can be extrapolated by contextual diversion      Electronically signed by Xiomy Fischer on 4/4/2021 at 5:48 PM

## 2021-04-02 NOTE — LETTER
Dr. Garcia [facility] [id number]  Dept: [phone]  Dept Fax: [phone]        4/4/21    Patient: Diego Nelson  YOB: 1955    Dear Kalyani Barber DO,    I had the pleasure of seeing one of your patients, NESHA ESTRELLA today in the office. Below are the relevant portions of my assessment and plan of care. IMPRESSION:  1. Right hip pain      PLAN:  Reviewed x-rays with the patient today in the office. Discussed etiology and natural history of possible labral tear of the right hip. The treatment options may include oral anti-inflammatories, bracing, injections, advanced imaging, activity modification, physical therapy and/or surgical intervention. I would like to do an MRI arthrogram of the right hip to rule out a labral tear. Discussed corticosteroid injection to the right hip under fluroscopy in the future. The patient would like to proceed with MRI arthrogram.  The patient will follow up in the office after MRI. We discussed that the patient should call us with any concerns or questions. Thank you for allowing me to participate in the care of this patient. I will keep you updated on this patient's follow up and I look forward to serving you and your patients again in the future. Please don't hesitate to contact me at my mobile number [phone].         Nima Coulter

## 2021-04-06 DIAGNOSIS — M25.551 RIGHT HIP PAIN: Primary | ICD-10-CM

## 2021-05-04 ENCOUNTER — HOSPITAL ENCOUNTER (OUTPATIENT)
Dept: INTERVENTIONAL RADIOLOGY/VASCULAR | Age: 66
Discharge: HOME | End: 2021-05-06
Payer: COMMERCIAL

## 2021-05-04 ENCOUNTER — HOSPITAL ENCOUNTER (OUTPATIENT)
Dept: MRI IMAGING | Age: 66
Discharge: HOME | End: 2021-05-06
Payer: COMMERCIAL

## 2021-05-04 DIAGNOSIS — M25.551 RIGHT HIP PAIN: ICD-10-CM

## 2021-05-04 PROCEDURE — 27093 INJECTION FOR HIP X-RAY: CPT

## 2021-05-04 PROCEDURE — 6360000004 HC RX CONTRAST MEDICATION: Performed by: ORTHOPAEDIC SURGERY

## 2021-05-04 PROCEDURE — 77002 NEEDLE LOCALIZATION BY XRAY: CPT

## 2021-05-04 PROCEDURE — 73722 MRI JOINT OF LWR EXTR W/DYE: CPT

## 2021-05-04 PROCEDURE — A9576 INJ PROHANCE MULTIPACK: HCPCS | Performed by: ORTHOPAEDIC SURGERY

## 2021-05-04 PROCEDURE — 2709999900 HC NON-CHARGEABLE SUPPLY

## 2021-05-04 RX ADMIN — GADOTERIDOL 1 ML: 279.3 INJECTION, SOLUTION INTRAVENOUS at 16:00

## 2021-05-04 RX ADMIN — IOPAMIDOL 12 ML: 755 INJECTION, SOLUTION INTRAVENOUS at 15:58

## 2021-05-04 NOTE — FLOWSHEET NOTE
PATIENT TO IR FOR RIGHT HIP INJECTION WITH INTRA-ARTICULAR CONTRAST. KT-RT AT BEDSIDE  DR. Tori Mistry AT BEDSIDE  PATIENT ID VERIFIED AND ALLERGIES CONFIRMED  CONSENT AND TIME - OUT PERFORMED  GADOLINIUM 0.2ML MIXED WITH 50ML ISOVUE 56-  DR NOYOLA INJECTED 12 ML INTO RIGHT HIP SITE  SITE COVERED WITH BANDAID. PATIENT TOLERATED WELL.   STABLE ON TRANSPORT TO MRI

## 2021-11-11 ENCOUNTER — OFFICE VISIT (OUTPATIENT)
Dept: FAMILY MEDICINE CLINIC | Age: 66
End: 2021-11-11
Payer: COMMERCIAL

## 2021-11-11 VITALS
HEART RATE: 65 BPM | BODY MASS INDEX: 32.1 KG/M2 | WEIGHT: 187 LBS | SYSTOLIC BLOOD PRESSURE: 130 MMHG | DIASTOLIC BLOOD PRESSURE: 72 MMHG

## 2021-11-11 DIAGNOSIS — Z13.1 SCREENING FOR DIABETES MELLITUS: ICD-10-CM

## 2021-11-11 DIAGNOSIS — M48.061 SPINAL STENOSIS OF LUMBAR REGION, UNSPECIFIED WHETHER NEUROGENIC CLAUDICATION PRESENT: Primary | ICD-10-CM

## 2021-11-11 DIAGNOSIS — K11.21 PAROTITIS, ACUTE: ICD-10-CM

## 2021-11-11 PROCEDURE — 99213 OFFICE O/P EST LOW 20 MIN: CPT | Performed by: STUDENT IN AN ORGANIZED HEALTH CARE EDUCATION/TRAINING PROGRAM

## 2021-11-11 ASSESSMENT — ENCOUNTER SYMPTOMS
COUGH: 0
ABDOMINAL DISTENTION: 0
ABDOMINAL PAIN: 0
VOMITING: 0
CONSTIPATION: 0
SHORTNESS OF BREATH: 0
WHEEZING: 0
BACK PAIN: 1
DIARRHEA: 0
NAUSEA: 0

## 2021-11-11 NOTE — PROGRESS NOTES
Visit Information    Have you changed or started any medications since your last visit including any over-the-counter medicines, vitamins, or herbal medicines? no   Have you stopped taking any of your medications? Is so, why? -  no  Are you having any side effects from any of your medications? - no    Have you seen any other physician or provider since your last visit?  no   Have you had any other diagnostic tests since your last visit?  no   Have you been seen in the emergency room and/or had an admission in a hospital since we last saw you?  no   Have you had your routine dental cleaning in the past 6 months?  no     Do you have an active MyChart account? If no, what is the barrier?   Yes    Patient Care Team:  Khris Borges, DO as PCP - [address], DO as PCP - [address] Provider    Medical History Review  Past Medical, Family, and Social History reviewed and does not contribute to the patient presenting condition    Health Maintenance   Topic Date Due    Hepatitis C screen  Never done    DTaP/Tdap/Td vaccine (1 - Tdap) Never done    Shingles Vaccine (1 of 2) Never done    DEXA (modify frequency per FRAX score)  Never done    Pneumococcal 65+ years Vaccine (1 of 1 - PPSV23) Never done    Breast cancer screen  06/09/2021    COVID-19 Vaccine (3 - Booster for Pfizer series) 08/09/2021    Flu vaccine (1) Never done    A1C test (Diabetic or Prediabetic)  09/17/2021    Colon cancer screen colonoscopy  04/08/2023    Lipid screen  07/16/2024    Hepatitis A vaccine  Aged Out    Hepatitis B vaccine  Aged Out    Hib vaccine  Aged Out    Meningococcal (ACWY) vaccine  Aged Out

## 2021-11-11 NOTE — PROGRESS NOTES
Attending Physician Statement  I  have discussed the care of [facility] including pertinent history and exam findings with the resident. I agree with the assessment, plan and orders as documented by the resident. /72 (Site: Left Upper Arm, Position: Sitting, Cuff Size: Medium Adult)   Pulse 65   Wt 187 lb (84.8 kg)   LMP 04/05/2009   BMI 32.10 kg/m²    BP Readings from Last 3 Encounters:   11/11/21 130/72   09/17/20 122/71   08/03/20 120/72     Wt Readings from Last 3 Encounters:   11/11/21 187 lb (84.8 kg)   04/02/21 174 lb 6.4 oz (79.1 kg)   10/19/20 169 lb (76.7 kg)          Diagnosis Orders   1. Spinal stenosis of lumbar region, unspecified whether neurogenic claudication present  DEXA BONE DENSITY AXIAL SKELETON    Torri Abdi DO, Neurosurgery, [facility]    MRI THORACIC SPINE WO CONTRAST    MRI LUMBAR SPINE WO CONTRAST    diclofenac sodium (VOLTAREN) 1 % GEL   2. Screening for diabetes mellitus  Hemoglobin A1C   3. Parotitis, acute         See orders. RTO as noted, discussed care plan with patient and Resident Physician. Questions answered. Call results - if indicated to patient.     Gustabo Liz DO 11/11/2021 1:49 PM
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
## 2021-11-11 NOTE — PATIENT INSTRUCTIONS
Thank you for letting us take care of you today. We hope all your questions were addressed. If a question was overlooked or something else comes to mind after you return home, please contact a member of your Care Team listed below. Your Care Team at [place] [address] is Team #3  Elmer Lopes MD (Faculty)  Keisha Sneed MD (Faculty  Aldaandrés Lynne MD (Resident)  Yamil Cadena (Resident)   Zuleyka Finnegan MD (Resident)  Kenisha Mei MD (Resident)  Miguel Salomon., MISA Shine., DARELL Unger., Jeri Yates., Lanette Stein ([address] [place] [address])  Stephanie Guillaume (Clinical Practice Manager)  Kavitha Mckeon [facility] (Clinical Pharmacist)     Office phone number: [phone]    If you need to get in right away due to illness, please be advised we have \"Same Day\" appointments available Monday-Friday. Please call us at [phone] option #3 to schedule your \"Same Day\" appointment.

## 2021-12-11 ENCOUNTER — HOSPITAL ENCOUNTER (OUTPATIENT)
Dept: MRI IMAGING | Age: 66
Discharge: HOME | End: 2021-12-13
Payer: COMMERCIAL

## 2021-12-11 ENCOUNTER — HOSPITAL ENCOUNTER (OUTPATIENT)
Dept: WOMENS IMAGING | Age: 66
Discharge: HOME | End: 2021-12-13
Payer: COMMERCIAL

## 2021-12-11 DIAGNOSIS — M48.061 SPINAL STENOSIS OF LUMBAR REGION, UNSPECIFIED WHETHER NEUROGENIC CLAUDICATION PRESENT: ICD-10-CM

## 2021-12-11 PROCEDURE — 77080 DXA BONE DENSITY AXIAL: CPT

## 2021-12-11 PROCEDURE — 72148 MRI LUMBAR SPINE W/O DYE: CPT

## 2021-12-11 PROCEDURE — 72146 MRI CHEST SPINE W/O DYE: CPT

## 2022-02-01 ENCOUNTER — OFFICE VISIT (OUTPATIENT)
Dept: NEUROSURGERY | Age: 67
End: 2022-02-01
Payer: COMMERCIAL

## 2022-02-01 VITALS
SYSTOLIC BLOOD PRESSURE: 133 MMHG | RESPIRATION RATE: 16 BRPM | HEART RATE: 83 BPM | TEMPERATURE: 97.8 F | WEIGHT: 189.6 LBS | DIASTOLIC BLOOD PRESSURE: 82 MMHG | BODY MASS INDEX: 32.54 KG/M2

## 2022-02-01 DIAGNOSIS — M47.818 ARTHROPATHY OF RIGHT SACROILIAC JOINT: Primary | ICD-10-CM

## 2022-02-01 PROCEDURE — 99204 OFFICE O/P NEW MOD 45 MIN: CPT | Performed by: NEUROLOGICAL SURGERY
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
## 2022-04-18 ENCOUNTER — INITIAL CONSULT (OUTPATIENT)
Dept: PAIN MANAGEMENT | Age: 67
End: 2022-04-18
Payer: COMMERCIAL

## 2022-04-18 VITALS — BODY MASS INDEX: 30.73 KG/M2 | HEIGHT: 64 IN | WEIGHT: 180 LBS

## 2022-04-18 DIAGNOSIS — M47.817 LUMBOSACRAL SPONDYLOSIS WITHOUT MYELOPATHY: Primary | ICD-10-CM

## 2022-04-18 DIAGNOSIS — M46.1 SACROILIITIS (HCC): ICD-10-CM

## 2022-04-18 DIAGNOSIS — G89.29 CHRONIC RIGHT SHOULDER PAIN: ICD-10-CM

## 2022-04-18 DIAGNOSIS — M47.812 CERVICAL SPONDYLOSIS WITHOUT MYELOPATHY: ICD-10-CM

## 2022-04-18 DIAGNOSIS — M25.511 CHRONIC RIGHT SHOULDER PAIN: ICD-10-CM

## 2022-04-18 PROCEDURE — 99204 OFFICE O/P NEW MOD 45 MIN: CPT | Performed by: PAIN MEDICINE

## 2022-04-18 ASSESSMENT — ENCOUNTER SYMPTOMS: BACK PAIN: 1
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
## 2022-04-26 ENCOUNTER — HOSPITAL ENCOUNTER (OUTPATIENT)
Dept: PHYSICAL THERAPY | Facility: CLINIC | Age: 67
Setting detail: THERAPIES SERIES
Discharge: HOME | End: 2022-04-26
Payer: COMMERCIAL

## 2022-04-26 PROCEDURE — 97162 PT EVAL MOD COMPLEX 30 MIN: CPT
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
## 2022-05-04 ENCOUNTER — HOSPITAL ENCOUNTER (OUTPATIENT)
Dept: PHYSICAL THERAPY | Facility: CLINIC | Age: 67
Setting detail: THERAPIES SERIES
Discharge: HOME | End: 2022-05-04
Payer: COMMERCIAL

## 2022-05-04 PROCEDURE — 97140 MANUAL THERAPY 1/> REGIONS: CPT

## 2022-05-04 PROCEDURE — 97110 THERAPEUTIC EXERCISES: CPT

## 2022-05-04 PROCEDURE — 97016 VASOPNEUMATIC DEVICE THERAPY: CPT

## 2022-05-06 ENCOUNTER — APPOINTMENT (OUTPATIENT)
Dept: PHYSICAL THERAPY | Facility: CLINIC | Age: 67
End: 2022-05-06
Payer: COMMERCIAL

## 2022-05-11 ENCOUNTER — HOSPITAL ENCOUNTER (OUTPATIENT)
Dept: PHYSICAL THERAPY | Facility: CLINIC | Age: 67
Setting detail: THERAPIES SERIES
Discharge: HOME | End: 2022-05-11
Payer: COMMERCIAL

## 2022-05-11 PROCEDURE — 97016 VASOPNEUMATIC DEVICE THERAPY: CPT

## 2022-05-11 PROCEDURE — 97110 THERAPEUTIC EXERCISES: CPT

## 2022-05-11 NOTE — FLOWSHEET NOTE
[] [facility]) - [facility] &  Therapy  [address]  [phone]  F: [phone] [] [address]  [address]   [address]  P: [phone]  F: [phone] [x] [place] &  Therapy  [address]  P: [phone]  F: [phone] [] [address]  P: [phone]  F: [phone] [] [address]  [facility]   [address]   [place]: [phone]  F: [phone]      Physical Therapy Daily Treatment Note    Date:  2022  Patient Name:  Greg Rosales    :  1955  MRN: [id number]   Physician: Antionette Alejo MD                              Insurance: [facility] Alfaro [address] ( visits approved, Angle Hunt after  visit)   Medical Diagnosis:   Diagnosis   M47.817 (ICD-10-CM) - Lumbosacral spondylosis without myelopathy   M46.1 (ICD-10-CM) - Sacroiliitis ([facility] [address]   M25.511, G89.29 (ICD-10-CM) - Chronic right shoulder pain   M47.812 (ICD-10-CM) - Cervical spondylosis without myelopathy                             Rehab Codes: M25.511, M54.5, M79.1, M62.81  Onset Date: 2021                                 Next 's appt: Pain block in LB      Visit# / total visits: 3/ 12    Cancels/No Shows: 0/1    Subjective:  Pt stated most pain in shoulder on arrival and very minimal in LB.    Pain:  [x] Yes  [] No Location:(R) shoulder, low back        Pain Rating: (0-10 scale) 7/10 in (R) shoulder/neck region, 2/10 in low back    Pain altered Tx:  [x] No  [] Yes  Action:  Comments:    Objective:  Modalities:   Precautions:  Exercises:  Exercise Reps/ Time Weight/ Level Comments    Nustep  8'  L1  no UE (irritates R shoulder) x          Pulleys    3',3'    flexion, scaption x   UT S       x   Levator Scap S       x   HS S       x   Figure 4 S       x              Tables Slides  20x    flexion/abd x              Scapular Retraction          Bridges  2x10     x   Clamshells  2x10    R only (painful to lie on R) x    TA contraction   10x10\"     x   Other: Manual: 5' MFR roller to R glut/pirifomis, 3' gentle STM to R thoracic      Specific Instructions for next treatment: Check pelvic alignment, MET, (B) shoulder mobility, scapular mobility, scapular strength, (B) hip strength, wall slides. Treatment Charges: Mins Units   []  Modalities     []  Ther Exercise 35 3   [x]  Manual Therapy 5    []  Ther Activities     []  Aquatics     [x]  Vasocompression 15 1   []  Other     Total Treatment time 55 4       Assessment: [x] Progressing toward goals. Continued with initiation in nustep with no UE for gentle warmup in gianni LE with no reported increase in irritation. Pt was able to increase reps in clams and bridges but requires rest d/t poor muscle endurance and reported LB discomfort. Added TA iso contraction to improve abdominal strengthening to aid in LB support. Pt required ed and tactile/vc in proper contraction and demo d/t poor ability to maintain but was able to complete 10\" hold for 10 reps. Continue with manual with focus on R hip/glut, held thoracic d/t pain. Modified stretches to be in seated positioning for improved technique  and decreased in LB discomfort from supine supine position with improved carryover from pt. Ended tx with vaso for pain management with decrease in pain reported by pt. Plan to progress shoulder ROM ex next tx whiling maintaining with in pt pain baljeet. [] No change. [] Other:  [x] [facility] at [facility]:  Patient is a 77year old female who presents to physical therapy with low back and (R) shoulder pain. Patient demonstrates impairments in pain tolerance, (B) shoulder AROM, cervical neck AROM, lumbar AROM, (B) UE strength, (B) LE strength and pelvic alignment.  These impairments affect the patient's ability to perform ADLs, household related tasks and work related tasks without increased pain. Patient would benefit from skilled physical therapy in order to improve impairments and overall quality of life. Educated patient on evaluation findings and poc with patient verbalizing good understanding of education at this time. STG: (to be met in 6 treatments)  1. ? Pain: Patient will report pain as 4/10 within (R) shoulder and low back   2. ? ROM: Patient will improve lumbar AROM to 100% within all planes in order to increase ease of donning socks and shoes  3. Patient will improve (R) cervical SB to 40 deg in order to indicate improved UT length and increase scapulohumeral rhythm   4. Patient will improve (B) shoulder elevation AROM to 170 deg in order to increase ease of performing overhead tasks   5. Patient will demonstrate improved pelvic alignment   6. Patient to be independent with home exercise program as demonstrated by performance with correct form without cues. LTG: (to be met in 12 treatments)  1. Patient will improve UEFS and THOMAS by 10% in order to indicate improved overall quality of life   2. Patient will improve (B) LE strength to 4+/5 in order to increase walking tolerance at work  3. Patient will improve (B) UE strength to 4+/5 in order to increase ease of performing lifting tasks  4. Patient will report pain as 2/10 within (R) shoulder and low back      Patient goals: \"To help pain and raise arm better\"      Pt. Education:  [x]? Plans/Goals, Risks/Benefits discussed  [x]? Home exercise program  Access Code: Zoraida Tariq  URL: [email or web address]  Date: 04/26/2022  Prepared by: Kizzy Madrigal     Exercises   Seated Gentle Upper Trapezius Stretch - 2 x daily - 7 x weekly - 3 sets - 30 sec hold  Gentle Levator Scapulae Stretch - 2 x daily - 7 x weekly - 3 sets - 30 sec hold  Seated Hamstring Stretch - 2 x daily - 7 x weekly - 3 sets - 30 sec hold  Supine Figure 4 Piriformis Stretch - 2 x daily - 7 x weekly - 3 sets - 30 sec hold  Seated Shoulder Flexion Towel Slide at Table Top - 2 x daily - 7 x weekly - 3 sets - 10 reps  Seated Shoulder Abduction Towel Slide at Table Top - 2 x daily - 7 x weekly - 3 sets - 10 reps  Seated Scapular Retraction - 2 x daily - 7 x weekly - 3 sets - 10 reps - 5 sec hold  Supine Bridge - 2 x daily - 7 x weekly - 3 sets - 10 reps  Clamshell - 2 x daily - 7 x weekly - 3 sets - 10 reps       Pt. Education:  [x] Yes  [] No  [] Reviewed Prior HEP/Ed  Method of Education: [x] Verbal  [x] Demo  [x] Written  Comprehension of Education:  [x] Verbalizes understanding. [x] Demonstrates understanding. [] Needs review. [] Demonstrates/verbalizes HEP/Ed previously given. Plan: [] Continue current frequency toward long and short term goals.     [x] Specific Instructions for subsequent treatments:       Time In: 6:04pm            Time Out: 7:15pm    Electronically signed by:  João Barksdale PTA

## 2022-05-16 ENCOUNTER — HOSPITAL ENCOUNTER (OUTPATIENT)
Dept: PHYSICAL THERAPY | Facility: CLINIC | Age: 67
Setting detail: THERAPIES SERIES
Discharge: HOME | End: 2022-05-16
Payer: COMMERCIAL

## 2022-05-16 PROCEDURE — 97110 THERAPEUTIC EXERCISES: CPT

## 2022-05-16 PROCEDURE — 97016 VASOPNEUMATIC DEVICE THERAPY: CPT

## 2022-05-16 NOTE — FLOWSHEET NOTE
[] [facility]) - [facility] &  Therapy  [address]  [phone]  F: [phone] [] [address]  [employer]   [address]  P: [phone]  F: [phone] [x] [place] &  Therapy  [address]  P: [phone]  F: [phone] [] [address]  P: [phone]  F: [phone] [] [address]  [facility]   [address]   [place]: [phone]  F: [phone]      Physical Therapy Daily Treatment Note    Date:  2022  Patient Name:  Rick Saenz    :  1955  MRN: [id number]   Physician: Yaron Carrillo MD                              Insurance: [facility]el Alfaro [facility] ( visits approved, [place] after 30th visit)   Medical Diagnosis:   Diagnosis   M47.817 (ICD-10-CM) - Lumbosacral spondylosis without myelopathy   M46.1 (ICD-10-CM) - Sacroiliitis ([facility] [address]   M25.511, G89.29 (ICD-10-CM) - Chronic right shoulder pain   M47.812 (ICD-10-CM) - Cervical spondylosis without myelopathy                             Rehab Codes: M25.511, M54.5, M79.1, M62.81  Onset Date: 2021                                 Next 's appt: Pain block in LB      Visit# / total visits:     Cancels/No Shows: 0/1    Subjective:  Pt stated pain in shoulder has decreased some but is still extremely sensitive to light touch. Her LB pain however, is elevated. Pain:  [x] Yes  [] No Location:(R) shoulder, low back        Pain Rating: (0-10 scale) 7/10 in (R) shoulder/neck region, 2/10 in low back    Pain altered Tx:  [x] No  [] Yes  Action:  Comments:    Objective:  Modalities:   Precautions: Pt can not lie on R side, L side only d/t R shoulder.    Exercises:  Exercise Reps/ Time Weight/ Level Comments    Nustep  8'  L1  no UE (irritates R shoulder) x          Pulleys    3',3'    flexion, scaption x   UT S       x   Levator Scap S       x   HS S       x   Figure 4 S       x              Tables Slides  20x    flexion/abd x   Wall slides  10x5\"  Flexion, max 5 reps for scaption Added 5/16 x              Scapular Retraction  15x5\"    added 5/16 x   Bridges  2x10     x   Clamshells  2x10    R only (painful to lie on R) x    TA contraction   10x10\"     x   Other: Manual: 5' MFR roller to R glut/pirifomis in L sided lying position.      Specific Instructions for next treatment: Check pelvic alignment, MET, (B) shoulder mobility, scapular mobility, scapular strength, (B) hip strength, wall slides. Treatment Charges: Mins Units   []  Modalities: HP 15 0   []  Ther Exercise 38 3   [x]  Manual Therapy 2    []  Ther Activities     []  Aquatics     [x]  Vasocompression 15 1   []  Other     Total Treatment time 70 4       Assessment: [x] Progressing toward goals. Pt arrived 8' tardy but was able to accommodate. Continued with nustep with no UE for gentle warm up followed by pulleys with cues in decreasing ROM if too painful. Was able to add wall washes to improve ROM with 5\" hold. Pt was able to baljeet flexion but had difficulty completing scaption with max of 5 reps d/t increase in pain with eccentric motion. Pt was able to baljeet scapular retraction with 5\" hold with min increase in thoracic pain at end of reps followed by posterior rolls with good carryover post. Decreased manual to 2' with hypervolt d/t time restraint with focus on R lower paraspinal. Added HP to LB in seated position with combo of vaso to R shoulder to further decrease LB tension for pain relief. [] No change. [] Other:  [x] Assessment at [facility]:  Patient is a 77year old female who presents to physical therapy with low back and (R) shoulder pain. Patient demonstrates impairments in pain tolerance, (B) shoulder AROM, cervical neck AROM, lumbar AROM, (B) UE strength, (B) LE strength and pelvic alignment.  These impairments affect the patient's ability to perform ADLs, household related tasks and work related tasks without increased pain. Patient would benefit from skilled physical therapy in order to improve impairments and overall quality of life. Educated patient on evaluation findings and poc with patient verbalizing good understanding of education at this time. STG: (to be met in 6 treatments)  1. ? Pain: Patient will report pain as 4/10 within (R) shoulder and low back   2. ? ROM: Patient will improve lumbar AROM to 100% within all planes in order to increase ease of donning socks and shoes  3. Patient will improve (R) cervical SB to 40 deg in order to indicate improved UT length and increase scapulohumeral rhythm   4. Patient will improve (B) shoulder elevation AROM to 170 deg in order to increase ease of performing overhead tasks   5. Patient will demonstrate improved pelvic alignment   6. Patient to be independent with home exercise program as demonstrated by performance with correct form without cues. LTG: (to be met in 12 treatments)  1. Patient will improve UEFS and THOMAS by 10% in order to indicate improved overall quality of life   2. Patient will improve (B) LE strength to 4+/5 in order to increase walking tolerance at work  3. Patient will improve (B) UE strength to 4+/5 in order to increase ease of performing lifting tasks  4. Patient will report pain as 2/10 within (R) shoulder and low back      Patient goals: \"To help pain and raise arm better\"      Pt. Education:  [x]? Plans/Goals, Risks/Benefits discussed  [x]? Home exercise program  Access Code: Tiffanie Montes  URL: [email or web address]  Date: 04/26/2022  Prepared by: Paris Fraga     Exercises   Seated Gentle Upper Trapezius Stretch - 2 x daily - 7 x weekly - 3 sets - 30 sec hold  Gentle Levator Scapulae Stretch - 2 x daily - 7 x weekly - 3 sets - 30 sec hold  Seated Hamstring Stretch - 2 x daily - 7 x weekly - 3 sets - 30 sec hold  Supine Figure 4 Piriformis Stretch - 2 x daily - 7 x weekly - 3 sets - 30 sec hold  Seated Shoulder Flexion Towel Slide at Table Top - 2 x daily - 7 x weekly - 3 sets - 10 reps  Seated Shoulder Abduction Towel Slide at Table Top - 2 x daily - 7 x weekly - 3 sets - 10 reps  Seated Scapular Retraction - 2 x daily - 7 x weekly - 3 sets - 10 reps - 5 sec hold  Supine Bridge - 2 x daily - 7 x weekly - 3 sets - 10 reps  Clamshell - 2 x daily - 7 x weekly - 3 sets - 10 reps       Pt. Education:  [x] Yes  [] No  [] Reviewed Prior HEP/Ed  Method of Education: [x] Verbal  [x] Demo  [x] Written  Comprehension of Education:  [x] Verbalizes understanding. [x] Demonstrates understanding. [] Needs review. [] Demonstrates/verbalizes HEP/Ed previously given. Plan: [] Continue current frequency toward long and short term goals.     [x] Specific Instructions for subsequent treatments:       Time In: 6:08pm            Time Out: 7:18pm    Electronically signed by:  João Barksdale PTA

## 2022-05-17 ENCOUNTER — HOSPITAL ENCOUNTER (OUTPATIENT)
Dept: PHYSICAL THERAPY | Facility: CLINIC | Age: 67
Setting detail: THERAPIES SERIES
Discharge: HOME | End: 2022-05-17
Payer: COMMERCIAL

## 2022-05-17 PROCEDURE — 97110 THERAPEUTIC EXERCISES: CPT

## 2022-05-17 NOTE — FLOWSHEET NOTE
[] [facility] [address] [facility]  [address]  [phone]  F: [phone] [] [address]  [facility]   [address]  P: [phone]  F: [phone] [x] [place] &  Therapy  [address] [place] [address]  P: [phone]  F: [phone] [] [address]  P: [phone]  F: [phone] [] [address]  [facility]   [address]   [place]: [phone]  F: [phone]      Physical Therapy Daily Treatment Note    Date:  2022  Patient Name:  Tami Morales    :  1955  MRN: [id number]   Physician: Rose Puga MD                              [facility] ( visits approved, [address] [place] [address] after  visit)   Medical Diagnosis:   Diagnosis   M47.817 (ICD-10-CM) - Lumbosacral spondylosis without myelopathy   M46.1 (ICD-10-CM) - Sacroiliitis ([facility] [address]   M25.511, G89.29 (ICD-10-CM) - Chronic right shoulder pain   M47.812 (ICD-10-CM) - Cervical spondylosis without myelopathy                             Rehab Codes: M25.511, M54.5, M79.1, M62.81  Onset Date: 2021                                 Next 's appt: Pain block in LB      Visit# / total visits:     Cancels/No Shows: 0/1    Subjective:    Pain:  [x] Yes  [] No Location:(R) shoulder, low back        Pain Rating: (0-10 scale) 4/10 in (R) shoulder/neck region, 4/10 in low back    Pain altered Tx:  [x] No  [] Yes  Action:  Comments: Pt mentioned that her shoulder is feeling better and her low back is doing unless she is bending over to pick things up. Objective:  Modalities:   Precautions: Pt can not lie on R side, L side only d/t R shoulder.    Exercises:  Exercise Reps/ Time Weight/ Level Comments    Nustep  10'  L1  no UE (irritates R shoulder) x          Pulleys    3',3'    flexion, scaption x   UT S       x   Levator Scap S       x   HS S     Seated  x   Figure 4 S     Seated  x              Wall slides  10x5\"  Flexion/Scaption x              Scapular Retraction  15x5\"      x   Bridges  2x10        Clamshells  2x10    R only (painful to lie on R)     TA contraction   10x10\"        Other:       Specific Instructions for next treatment:     Treatment Charges: Mins Units   []  Modalities: HP      [x]  Ther Exercise 40 3   []  Manual Therapy     []  Ther Activities     []  Aquatics     []  Vasocompression     []  Other     Total Treatment time 40 3       Assessment: [x] Progressing toward goals. Focused session on proper form and modification to stretches so she can perform at work. Mod verbal cueing for TA activation with stretches and exercises to enable stability for her lumbar. Pt to receive pain block this week and will call to get on schedule. [] No change. [] Other:         STG: (to be met in 6 treatments)  1. ? Pain: Patient will report pain as 4/10 within (R) shoulder and low back   2. ? ROM: Patient will improve lumbar AROM to 100% within all planes in order to increase ease of donning socks and shoes  3. Patient will improve (R) cervical SB to 40 deg in order to indicate improved UT length and increase scapulohumeral rhythm   4. Patient will improve (B) shoulder elevation AROM to 170 deg in order to increase ease of performing overhead tasks   5. Patient will demonstrate improved pelvic alignment   6. Patient to be independent with home exercise program as demonstrated by performance with correct form without cues. LTG: (to be met in 12 treatments)  1. Patient will improve UEFS and THOMAS by 10% in order to indicate improved overall quality of life   2. Patient will improve (B) LE strength to 4+/5 in order to increase walking tolerance at work  3. Patient will improve (B) UE strength to 4+/5 in order to increase ease of performing lifting tasks  4.  Patient will report pain as 2/10 within (R) shoulder and low back      Patient goals: \"To help pain and raise arm better\"      Pt. Education:  [x]? Plans/Goals, Risks/Benefits discussed  [x]? Home exercise program  Access Code: Orin Range  URL: [email or web address]  Date: 04/26/2022  Prepared by: Jabier Kendrick     Exercises   Seated Gentle Upper Trapezius Stretch - 2 x daily - 7 x weekly - 3 sets - 30 sec hold  Gentle Levator Scapulae Stretch - 2 x daily - 7 x weekly - 3 sets - 30 sec hold  Seated Hamstring Stretch - 2 x daily - 7 x weekly - 3 sets - 30 sec hold  Supine Figure 4 Piriformis Stretch - 2 x daily - 7 x weekly - 3 sets - 30 sec hold  Seated Shoulder Flexion Towel Slide at Table Top - 2 x daily - 7 x weekly - 3 sets - 10 reps  Seated Shoulder Abduction Towel Slide at Table Top - 2 x daily - 7 x weekly - 3 sets - 10 reps  Seated Scapular Retraction - 2 x daily - 7 x weekly - 3 sets - 10 reps - 5 sec hold  Supine Bridge - 2 x daily - 7 x weekly - 3 sets - 10 reps  Clamshell - 2 x daily - 7 x weekly - 3 sets - 10 reps       Pt. Education:  [x] Yes  [] No  [] Reviewed Prior HEP/Ed  Method of Education: [x] Verbal  [x] Demo  [x] Written  Comprehension of Education:  [x] Verbalizes understanding. [x] Demonstrates understanding. [] Needs review. [] Demonstrates/verbalizes HEP/Ed previously given. Plan: [] Continue current frequency toward long and short term goals.     [x] Specific Instructions for subsequent treatments:       Time In: 5:20pm            Time Out:  6:23pm    Electronically signed by:  Damaris Rodarte PTA

## 2022-05-18 ENCOUNTER — HOSPITAL ENCOUNTER (OUTPATIENT)
Dept: PHYSICAL THERAPY | Facility: CLINIC | Age: 67
Setting detail: THERAPIES SERIES
End: 2022-05-18
Payer: COMMERCIAL

## 2022-05-19 ENCOUNTER — HOSPITAL ENCOUNTER (OUTPATIENT)
Dept: PAIN MANAGEMENT | Facility: CLINIC | Age: 67
Discharge: HOME | End: 2022-05-19
Payer: COMMERCIAL

## 2022-05-19 VITALS
RESPIRATION RATE: 13 BRPM | HEART RATE: 76 BPM | WEIGHT: 186 LBS | OXYGEN SATURATION: 98 % | BODY MASS INDEX: 31.76 KG/M2 | SYSTOLIC BLOOD PRESSURE: 125 MMHG | DIASTOLIC BLOOD PRESSURE: 63 MMHG | TEMPERATURE: 97.3 F | HEIGHT: 64 IN

## 2022-05-19 DIAGNOSIS — R52 PAIN MANAGEMENT: ICD-10-CM

## 2022-05-19 PROCEDURE — 6360000002 HC RX W HCPCS: Performed by: PAIN MEDICINE

## 2022-05-19 PROCEDURE — 2500000003 HC RX 250 WO HCPCS: Performed by: PAIN MEDICINE

## 2022-05-19 PROCEDURE — G0260 INJ FOR SACROILIAC JT ANESTH: HCPCS

## 2022-05-19 PROCEDURE — 27096 INJECT SACROILIAC JOINT: CPT | Performed by: PAIN MEDICINE

## 2022-05-19 RX ORDER — MIDAZOLAM HYDROCHLORIDE 2 MG/2ML
INJECTION, SOLUTION INTRAMUSCULAR; INTRAVENOUS
Status: COMPLETED | OUTPATIENT
Start: 2022-05-19 | End: 2022-05-19

## 2022-05-19 RX ORDER — DEXAMETHASONE SODIUM PHOSPHATE 10 MG/ML
INJECTION, SOLUTION INTRAMUSCULAR; INTRAVENOUS
Status: COMPLETED | OUTPATIENT
Start: 2022-05-19 | End: 2022-05-19

## 2022-05-19 RX ORDER — BUPIVACAINE HYDROCHLORIDE 2.5 MG/ML
INJECTION, SOLUTION INFILTRATION; PERINEURAL
Status: COMPLETED | OUTPATIENT
Start: 2022-05-19 | End: 2022-05-19

## 2022-05-19 RX ADMIN — BUPIVACAINE HYDROCHLORIDE 4 ML: 2.5 INJECTION, SOLUTION INFILTRATION; PERINEURAL at 12:28

## 2022-05-19 RX ADMIN — MIDAZOLAM HYDROCHLORIDE 2 MG: 1 INJECTION, SOLUTION INTRAMUSCULAR; INTRAVENOUS at 12:25

## 2022-05-19 RX ADMIN — DEXAMETHASONE SODIUM PHOSPHATE 10 MG: 10 INJECTION, SOLUTION INTRAMUSCULAR; INTRAVENOUS at 12:28

## 2022-05-19 ASSESSMENT — PAIN - FUNCTIONAL ASSESSMENT
PAIN_FUNCTIONAL_ASSESSMENT: NONE - DENIES PAIN
PAIN_FUNCTIONAL_ASSESSMENT: 0-10

## 2022-05-19 ASSESSMENT — PAIN DESCRIPTION - DESCRIPTORS: DESCRIPTORS: ACHING

## 2022-05-19 NOTE — OP NOTE
satisfactory condition. Appropriate written discharge instructions given to the patient.       Ofelia Bain MD

## 2022-05-19 NOTE — H&P
Pain Pre-Op H&P Note    Jazmín Donovan MD    HPI: Caio Benavides  presents with back pain. Past Medical History:   Diagnosis Date    Constipation     Diverticulitis     Diverticulosis 2009    Diverticulosis     Fibroids     GERD (gastroesophageal reflux disease)     Glaucoma 2011    Hay fever     Hiatal hernia     Irritable bowel disease     Kidney infection     Lumbar stenosis     MVP (mitral valve prolapse)        Past Surgical History:   Procedure Laterality Date    COLONOSCOPY      FOOT SURGERY Left     HYSTERECTOMY      RSO,LS, previously had tubal with LO    LYMPH NODE BIOPSY Right 11/17/2010    axilla    OVARY SURGERY  1990's    mass and ovary removed    ABDIEL AND BSO  04/02/2009    Extended abdominal hyst w/rt salpingo-ooph, lt salpingectomy. lysis of extensive small and large intestinal adhesions. right and left ureterolysis. lysis of pelvic adhesions with retroperitoneal exploration. procurement peritoneal cytology.      TONSILLECTOMY AND ADENOIDECTOMY  1973    TUMOR REMOVAL      left foot       Family History   Problem Relation Age of Onset    Breast Cancer Sister     Diabetes Sister     Diabetes Mother     Hypertension Mother     Tuberculosis Mother     Obesity Mother     Heart Disease Father     Arthritis Father     Diabetes Sister     Diabetes Sister     Colon Cancer Maternal Grandmother     Stroke Other     Kidney Disease Other        Allergies   Allergen Reactions    Cat Hair Extract Anaphylaxis and Swelling    Codeine     Eggs Or Egg-Derived Products [Eggs Or Egg-Derived Products] Diarrhea         Current Outpatient Medications:     diclofenac sodium (VOLTAREN) 1 % GEL, Apply 4 g topically 4 times daily as needed for Pain, Disp: 150 g, Rfl: 3    EPINEPHrine (EPIPEN 2-ANSON) 0.3 MG/0.3ML SOAJ injection, Inject 0.3 mLs into the muscle once for 1 dose Use as directed for allergic reaction, Disp: 0.3 mL, Rfl: 0    albuterol sulfate  (90 Base) MCG/ACT inhaler, Inhale 2 puffs into the lungs every 6 hours as needed for Wheezing, Disp: 1 Inhaler, Rfl: 1    ibuprofen (ADVIL;MOTRIN) 800 MG tablet, Take 1 tablet by mouth every 8 hours as needed for Pain, Disp: 90 tablet, Rfl: 2    mirabegron (MYRBETRIQ) 50 MG TB24, Take 50 mg by mouth daily (Patient not taking: Reported on 5/19/2022), Disp: , Rfl:     gabapentin (NEURONTIN) 300 MG capsule, TAKE 1 OR 2 CAPSULES BY MOUTH AT BEDTIME, Disp: , Rfl: 0    cetirizine (ZYRTEC) 10 MG tablet, Take 10 mg by mouth daily. , Disp: , Rfl:     Multiple Vitamin (MULTI-VITAMIN DAILY PO), Take  by mouth., Disp: , Rfl:     Mometasone Furoate (NASONEX NA), by Nasal route., Disp: , Rfl:     LUMIGAN 0.01 % SOLN, Place 1 drop into both eyes nightly., Disp: , Rfl:     Social History     Tobacco Use    Smoking status: Never Smoker    Smokeless tobacco: Never Used   Substance Use Topics    Alcohol use: Not Currently     Comment: socially       Review of Systems:   Focused review of systems was performed, and negative as pertinent to diagnosis, except as stated in HPI. Physical Exam  Constitutional:       Appearance: Normal appearance. Pulmonary:      Effort: Pulmonary effort is normal.   Neurological:      Mental Status: alert. Psychiatric:         Attention and Perception: Attention and perception normal.         Mood and Affect: Mood and affect normal.   Cardiovascular:      Rate: Normal rate. ASA: 3          Mallampati: 2       Patient's current physical status, medications, medical history, and HPI have been reviewed and updated as appropriate on this date: 05/19/22    Risk/Benefit(s): The risks, benefits, alternatives, and potential complications have been discussed with the patient/family and informed consent has been obtained for the procedure/sedation.     Diagnosis:   Back pain      Plan: SI joint injections        Cm Jurado MD

## 2022-05-20 DIAGNOSIS — M25.511 RIGHT SHOULDER PAIN, UNSPECIFIED CHRONICITY: Primary | ICD-10-CM

## 2022-05-23 ENCOUNTER — OFFICE VISIT (OUTPATIENT)
Dept: ORTHOPEDIC SURGERY | Age: 67
End: 2022-05-23
Payer: COMMERCIAL

## 2022-05-23 VITALS — HEIGHT: 64 IN | WEIGHT: 186 LBS | BODY MASS INDEX: 31.76 KG/M2

## 2022-05-23 DIAGNOSIS — M25.511 RIGHT SHOULDER PAIN, UNSPECIFIED CHRONICITY: Primary | ICD-10-CM

## 2022-05-23 PROCEDURE — 99214 OFFICE O/P EST MOD 30 MIN: CPT | Performed by: ORTHOPAEDIC SURGERY

## 2022-05-23 PROCEDURE — 20610 DRAIN/INJ JOINT/BURSA W/O US: CPT | Performed by: ORTHOPAEDIC SURGERY

## 2022-05-23 ASSESSMENT — ENCOUNTER SYMPTOMS
EYE DISCHARGE: 0
SHORTNESS OF BREATH: 0
ROS SKIN COMMENTS: NEGATIVE FOR RASH
ABDOMINAL PAIN: 0

## 2022-05-23 NOTE — LETTER
Dr. Maninder Talamantes  [address] [facility]  Dept: [phone]  Dept Fax: [phone]        5/23/22    Patient: Anibal Velasco  YOB: 1955    Dear Christiana Farr DO,    I had the pleasure of seeing one of your patients, NESHA ESTRELLA today in the office. Below are the relevant portions of my assessment and plan of care. IMPRESSION:  1. Right shoulder pain, unspecified chronicity      PLAN:  The patient tolerated a subacromial corticosteroid injection today. He should continue with physical therapy. I plan to see her back in 6 weeks for further evaluation and alter treatment plan as necessary at that time. Thank you for allowing me to participate in the care of this patient. I will keep you updated on this patient's follow up and I look forward to serving you and your patients again in the future. Please don't hesitate to contact me at my mobile number [phone].         Ori Blood
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
## 2022-05-25 RX ORDER — METHYLPREDNISOLONE ACETATE 80 MG/ML
80 INJECTION, SUSPENSION INTRA-ARTICULAR; INTRALESIONAL; INTRAMUSCULAR; SOFT TISSUE ONCE
Status: SHIPPED | OUTPATIENT
Start: 2022-05-25

## 2022-05-25 RX ORDER — BUPIVACAINE HYDROCHLORIDE 2.5 MG/ML
2 INJECTION, SOLUTION INFILTRATION; PERINEURAL ONCE
Status: SHIPPED | OUTPATIENT
Start: 2022-05-25

## 2022-05-31 ENCOUNTER — TELEPHONE (OUTPATIENT)
Dept: FAMILY MEDICINE CLINIC | Age: 67
End: 2022-05-31

## 2022-05-31 NOTE — TELEPHONE ENCOUNTER
Pt contacted office, has some questions regarding physical therapy, would like a call from provider. Please contact pt [phone]. Also, pt is needing a letter due to being claustrophobic that she wont have to be fitted for the N95 mask due to this. Pt works at [employer]. Please advise, thank you.

## 2022-06-01 NOTE — TELEPHONE ENCOUNTER
Please return a call to the patient - offer an appointment with first available PGY Resident at the [facility]. office Tiffanie Garcia).     Marline Saldaña DO

## 2022-06-06 ENCOUNTER — OFFICE VISIT (OUTPATIENT)
Dept: PAIN MANAGEMENT | Age: 67
End: 2022-06-06
Payer: COMMERCIAL

## 2022-06-06 VITALS — HEIGHT: 64 IN | BODY MASS INDEX: 31.41 KG/M2 | WEIGHT: 184 LBS

## 2022-06-06 DIAGNOSIS — M47.817 LUMBOSACRAL SPONDYLOSIS WITHOUT MYELOPATHY: Primary | ICD-10-CM

## 2022-06-06 DIAGNOSIS — M46.1 SACROILIITIS (HCC): ICD-10-CM

## 2022-06-06 PROCEDURE — 99213 OFFICE O/P EST LOW 20 MIN: CPT | Performed by: PAIN MEDICINE

## 2022-06-06 PROCEDURE — 1123F ACP DISCUSS/DSCN MKR DOCD: CPT | Performed by: PAIN MEDICINE

## 2022-06-06 ASSESSMENT — ENCOUNTER SYMPTOMS
BACK PAIN: 1
BOWEL INCONTINENCE: 0
ABDOMINAL PAIN: 0
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
## 2022-06-08 ENCOUNTER — TELEPHONE (OUTPATIENT)
Dept: ADMINISTRATIVE | Age: 67
End: 2022-06-08

## 2022-06-08 ENCOUNTER — OFFICE VISIT (OUTPATIENT)
Dept: FAMILY MEDICINE CLINIC | Age: 67
End: 2022-06-08
Payer: COMMERCIAL

## 2022-06-08 VITALS
SYSTOLIC BLOOD PRESSURE: 139 MMHG | DIASTOLIC BLOOD PRESSURE: 73 MMHG | HEIGHT: 64 IN | TEMPERATURE: 96.8 F | WEIGHT: 182.6 LBS | HEART RATE: 69 BPM | BODY MASS INDEX: 31.18 KG/M2

## 2022-06-08 DIAGNOSIS — R73.03 PREDIABETES: Primary | ICD-10-CM

## 2022-06-08 DIAGNOSIS — M48.061 SPINAL STENOSIS OF LUMBAR REGION, UNSPECIFIED WHETHER NEUROGENIC CLAUDICATION PRESENT: ICD-10-CM

## 2022-06-08 DIAGNOSIS — Z86.010 HISTORY OF COLON POLYPS: ICD-10-CM

## 2022-06-08 DIAGNOSIS — Z12.31 ENCOUNTER FOR SCREENING MAMMOGRAM FOR MALIGNANT NEOPLASM OF BREAST: ICD-10-CM

## 2022-06-08 LAB — HBA1C MFR BLD: 6 %

## 2022-06-08 PROCEDURE — 99213 OFFICE O/P EST LOW 20 MIN: CPT | Performed by: STUDENT IN AN ORGANIZED HEALTH CARE EDUCATION/TRAINING PROGRAM

## 2022-06-08 PROCEDURE — 1123F ACP DISCUSS/DSCN MKR DOCD: CPT | Performed by: STUDENT IN AN ORGANIZED HEALTH CARE EDUCATION/TRAINING PROGRAM

## 2022-06-08 PROCEDURE — 83036 HEMOGLOBIN GLYCOSYLATED A1C: CPT | Performed by: STUDENT IN AN ORGANIZED HEALTH CARE EDUCATION/TRAINING PROGRAM

## 2022-06-08 RX ORDER — ACETAMINOPHEN 500 MG
1000 TABLET ORAL 3 TIMES DAILY
Qty: 180 TABLET | Refills: 1 | Status: SHIPPED | OUTPATIENT
Start: 2022-06-08

## 2022-06-08 RX ORDER — LIDOCAINE 50 MG/G
1 PATCH TOPICAL DAILY
Qty: 30 PATCH | Refills: 2 | Status: SHIPPED | OUTPATIENT
Start: 2022-06-08 | End: 2022-09-12 | Stop reason: SDUPTHER

## 2022-06-08 RX ORDER — GABAPENTIN 300 MG/1
CAPSULE ORAL
Qty: 90 CAPSULE | Refills: 1 | Status: SHIPPED | OUTPATIENT
Start: 2022-06-08 | End: 2022-09-08

## 2022-06-08 SDOH — ECONOMIC STABILITY: FOOD INSECURITY: WITHIN THE PAST 12 MONTHS, YOU WORRIED THAT YOUR FOOD WOULD RUN OUT BEFORE YOU GOT MONEY TO BUY MORE.: NEVER TRUE

## 2022-06-08 SDOH — ECONOMIC STABILITY: FOOD INSECURITY: WITHIN THE PAST 12 MONTHS, THE FOOD YOU BOUGHT JUST DIDN'T LAST AND YOU DIDN'T HAVE MONEY TO GET MORE.: NEVER TRUE

## 2022-06-08 ASSESSMENT — ENCOUNTER SYMPTOMS
BACK PAIN: 1
ABDOMINAL PAIN: 0
SHORTNESS OF BREATH: 0

## 2022-06-08 ASSESSMENT — PATIENT HEALTH QUESTIONNAIRE - PHQ9
SUM OF ALL RESPONSES TO PHQ9 QUESTIONS 1 & 2: 0
SUM OF ALL RESPONSES TO PHQ QUESTIONS 1-9: 0
1. LITTLE INTEREST OR PLEASURE IN DOING THINGS: 0
2. FEELING DOWN, DEPRESSED OR HOPELESS: 0
SUM OF ALL RESPONSES TO PHQ QUESTIONS 1-9: 0
DEPRESSION UNABLE TO ASSESS: PT REFUSES

## 2022-06-08 ASSESSMENT — SOCIAL DETERMINANTS OF HEALTH (SDOH): HOW HARD IS IT FOR YOU TO PAY FOR THE VERY BASICS LIKE FOOD, HOUSING, MEDICAL CARE, AND HEATING?: NOT VERY HARD

## 2022-06-08 NOTE — PROGRESS NOTES
Visit Information    Have you changed or started any medications since your last visit including any over-the-counter medicines, vitamins, or herbal medicines? no   Have you stopped taking any of your medications? Is so, why? -  no  Are you having any side effects from any of your medications? - no    Have you seen any other physician or provider since your last visit? yes - Neurosurgery, Pain Management, PT, Ortho   Have you had any other diagnostic tests since your last visit? yes - XR   Have you been seen in the emergency room and/or had an admission in a hospital since we last saw you?  no   Have you had your routine dental cleaning in the past 6 months?  no     Do you have an active MyChart account? If no, what is the barrier?   Yes    Patient Care Team:  Venkatesh Bailey,  as PCP - [address] [place] [address], DO as PCP - [facility] Provider    Medical History Review  Past Medical, Family, and Social History reviewed and does contribute to the patient presenting condition    Health Maintenance   Topic Date Due    Hepatitis C screen  Never done    DTaP/Tdap/Td vaccine (1 - Tdap) Never done    Shingles vaccine (1 of 2) Never done    Pneumococcal 65+ years Vaccine (1 - PCV) Never done    Breast cancer screen  06/09/2021    Depression Screen  02/05/2022    A1C test (Diabetic or Prediabetic)  12/17/2022    Colorectal Cancer Screen  04/08/2023    Lipids  12/17/2026    DEXA (modify frequency per FRAX score)  Completed    Flu vaccine  Completed    COVID-19 Vaccine  Completed    Hepatitis A vaccine  Aged Out    Hepatitis B vaccine  Aged Out    Hib vaccine  Aged Out    Meningococcal (ACWY) vaccine  Aged Out

## 2022-06-08 NOTE — LETTER
[address]  [place] [address]  Phone: [phone]  Fax: [phone]    Curry Zelaya MD        June 8, 2022     Patient: Michael Snyder   YOB: 1955   Date of Visit: 6/8/2022       To Whom It May Concern: It is my medical opinion that Junior Arreaga should remain out of work until from 6/6/2022 until 6/20/2022. If you have any questions or concerns, please don't hesitate to call.     Sincerely,        Curry Zelaya MD

## 2022-06-08 NOTE — TELEPHONE ENCOUNTER
Patient called to get another injection scheduled, unable to reach office to transfer the pt, please call pt [phone] [facility]/sc

## 2022-06-08 NOTE — PROGRESS NOTES
Attending Physician Statement  I have discussed the care of Aleksandar Cohen 77 y.o. female, including pertinent history and exam findings, with the resident Dr. Natalia Pinto MD.    History and Exam:   Chief Complaint   Patient presents with    Back Pain     follow up about the same    Neck Pain     follow up about the same       Past Medical History:   Diagnosis Date    Constipation     Diverticulitis     Diverticulosis 2009    Diverticulosis     Fibroids     GERD (gastroesophageal reflux disease)     Glaucoma 2011    Hay fever     Hiatal hernia     Irritable bowel disease     Kidney infection     Lumbar stenosis     MVP (mitral valve prolapse)      Allergies   Allergen Reactions    Cat Hair Extract Anaphylaxis and Swelling    Codeine     Eggs Or Egg-Derived Products [Eggs Or Egg-Derived Products] Diarrhea      I have seen and examined the patient and the key elements of the encounter have been performed by me.   BP Readings from Last 3 Encounters:   06/08/22 139/73   05/19/22 125/63   02/01/22 133/82     /73 (Site: Left Upper Arm, Position: Sitting, Cuff Size: Medium Adult)   Pulse 69   Temp 96.8 °F (36 °C) (Temporal)   Ht 5' 4.02\" (1.626 m)   Wt 182 lb 9.6 oz (82.8 kg)   LMP 04/05/2009   BMI 31.33 kg/m²   Lab Results   Component Value Date    WBC 4.9 06/25/2016    HGB 12.5 06/25/2016    HCT 39.2 06/25/2016     06/25/2016    CHOL 206 (H) 07/16/2019    TRIG 74 07/16/2019    HDL 80 07/16/2019    ALT 19 06/25/2016    AST 17 06/25/2016     01/05/2021    K 4.4 01/05/2021     (H) 01/05/2021    CREATININE 0.74 01/05/2021    BUN 22 01/05/2021    CO2 25 01/05/2021    TSH 1.81 12/04/2015    INR 1.1 06/25/2016    LABA1C 6.0 06/08/2022     Lab Results   Component Value Date    LABALBU 4.3 06/25/2016     Lab Results   Component Value Date    FERRITIN 62 04/08/2012     Lab Results   Component Value Date    LDLCHOLESTEROL 111 07/16/2019     I agree with the assessment, plan and the diagnosis of    Diagnosis Orders   1. Prediabetes  POCT glycosylated hemoglobin (Hb A1C)   2. Spinal stenosis of lumbar region, unspecified whether neurogenic claudication present  Internal Referral to RETAIN   3. Encounter for screening mammogram for malignant neoplasm of breast  [facility] DIGITAL SCREEN BILATERAL   4. History of colon polyps  Cristopher Lawrence MD, Gastroenterology, [place]    . I agree with orders as documented by the resident. More than 25 minutes spent  in face to face encounter with the patient and more than half in counseling. Patient's questions were answered. Patient Voiced understanding to the counseling. Return in about 4 weeks (around 7/6/2022).    (GC Modifier)-Dr. Claire Mason MD
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
## 2022-06-08 NOTE — PATIENT INSTRUCTIONS
Thank you for letting us take care of you today. We hope all your questions were addressed. If a question was overlooked or something else comes to mind after you return home, please contact a member of your Care Team listed below. Your Care Team at [place] [address] is Team #2  Alfreda Wong DO (Faculty)  Elyse Jackman (Faculty)  Rambo Sheppard MD (Resident)  Javad Giron MD (Resident)  Kira Fraser MD (Resident)  Geri Coello MD (Resident)  Nathalie Hernandez., DARELL Begum.,  JAI Tolbert., [facility] office)  Remington Nath, [address] (Clinical Practice Manager)  Nathen Ray, [facility] (Clinical Pharmacist)     Office phone number: [phone]    If you need to get in right away due to illness, please be advised we have \"Same Day\" appointments available Monday-Friday. Please call us at [phone] option #3 to schedule your \"Same Day\" appointment.

## 2022-06-09 ENCOUNTER — TELEPHONE (OUTPATIENT)
Dept: ADMINISTRATIVE | Age: 67
End: 2022-06-09

## 2022-06-09 NOTE — TELEPHONE ENCOUNTER
The Pt called wanting to schedule her 2nd Joint Injection with Dr Diane Yang. The Pt is requesting a Call back this morning. (If at all possible)  The Pt can be reached at [phone].

## 2022-06-10 ENCOUNTER — TELEPHONE (OUTPATIENT)
Dept: PAIN MANAGEMENT | Age: 67
End: 2022-06-10

## 2022-06-10 ENCOUNTER — HOSPITAL ENCOUNTER (OUTPATIENT)
Dept: PHYSICAL THERAPY | Facility: CLINIC | Age: 67
Setting detail: THERAPIES SERIES
Discharge: HOME | End: 2022-06-10
Payer: COMMERCIAL

## 2022-06-10 PROCEDURE — 97110 THERAPEUTIC EXERCISES: CPT

## 2022-06-10 NOTE — FLOWSHEET NOTE
[] [facility] [address]  [address]  [phone]  F: [phone] [] [address]  [facility] 36   [address]  P: [phone]  F: [phone] [x] [place]  [facility]  [address]  P: [phone]  F: [phone] [] [address]  P: [phone]  F: [phone] [] [address]  [facility]   [address]   [place]: [phone]  F: [phone]      Physical Therapy Daily Treatment Note    Date:  6/10/2022  Patient Name:  Gregg Dixon    :  1955  MRN: [id number]   Physician: Sharyle Lansing, MD                              Insurance: Lobito Rosa ( visits approved, No [employer] after  visit)   Medical Diagnosis:   Diagnosis   M47.817 (ICD-10-CM) - Lumbosacral spondylosis without myelopathy   M46.1 (ICD-10-CM) - Sacroiliitis ([facility] [address]   M25.511, G89.29 (ICD-10-CM) - Chronic right shoulder pain   M47.812 (ICD-10-CM) - Cervical spondylosis without myelopathy                             Rehab Codes: M25.511, M54.5, M79.1, M62.81  Onset Date: 2021                                 Next 's appt: Pain block in LB      Visit# / total visits:     Cancels/No Shows: 0/1    Subjective:    Pain:  [x] Yes  [] No Location:(R) shoulder, low back        Pain Rating: (0-10 scale) 4/10 in (R) shoulder/neck region, 8/10 in low back    Pain altered Tx:  [x] No  [] Yes  Action:  Comments: Pt stated that her pain block is not helping and they want to do another one. Pt also mentioned that Dr Matias Sanchez gave her a steriod shot in her shoulder last Thursday. Objective:  Modalities:   Precautions: Pt can not lie on R side, L side only d/t R shoulder.    Exercises:  Exercise Reps/ Time Weight/ Level Comments    Nicki  10'  L1  x          Pulleys    3',3'    flexion, scaption    UT S  30\"x3     x   Levator Scap S  30\"x3     x   HS S     Seated     Figure 4 S 30\"x3   Seated  x          SKTC 30\"x3  Bilaterally  x   DKTC 30\"x1  Discomfort after first set x   TA contraction 10x10\"   x   TA with UE 2x10     x   TA with LE mini march 2x10   x                 Wall slides  10x5\"  Flexion/Scaption               Scapular Retraction  15x5\"         Bridges  2x10        Clamshells  2x10    R only (painful to lie on R)            Other:       Specific Instructions for next treatment:     Treatment Charges: Mins Units   []  Modalities: HP      [x]  Ther Exercise 40 3   []  Manual Therapy     []  Ther Activities     []  Aquatics     []  Vasocompression     []  Other     Total Treatment time 40 3       Assessment: [] Progressing toward goals. [] No change. [x] Other: Initiated session with a gentle warm up on SciFit. Educated and had pt perform LB stretches on mat table. Re-educated pt on TA activation and able to perform gentle pain free UE then LE movements with core engagement. Will continue to slowly add in more stretches and exercises as pt symptoms allow. STG: (to be met in 6 treatments)  1. ? Pain: Patient will report pain as 4/10 within (R) shoulder and low back   2. ? ROM: Patient will improve lumbar AROM to 100% within all planes in order to increase ease of donning socks and shoes  3. Patient will improve (R) cervical SB to 40 deg in order to indicate improved UT length and increase scapulohumeral rhythm   4. Patient will improve (B) shoulder elevation AROM to 170 deg in order to increase ease of performing overhead tasks   5. Patient will demonstrate improved pelvic alignment   6. Patient to be independent with home exercise program as demonstrated by performance with correct form without cues. LTG: (to be met in 12 treatments)  1. Patient will improve UEFS and THOMAS by 10% in order to indicate improved overall quality of life   2.  Patient will improve (B) LE strength to 4+/5 in order to increase walking tolerance at work  3. Patient will improve (B) UE strength to 4+/5 in order to increase ease of performing lifting tasks  4. Patient will report pain as 2/10 within (R) shoulder and low back      Patient goals: \"To help pain and raise arm better\"      Pt. Education:  [x]? Plans/Goals, Risks/Benefits discussed  [x]? Home exercise program  Access Code: Emily Valdivia  URL: [email or web address]  Date: 04/26/2022  Prepared by: Dania Staggers     Exercises   Seated Gentle Upper Trapezius Stretch - 2 x daily - 7 x weekly - 3 sets - 30 sec hold  Gentle Levator Scapulae Stretch - 2 x daily - 7 x weekly - 3 sets - 30 sec hold  Seated Hamstring Stretch - 2 x daily - 7 x weekly - 3 sets - 30 sec hold  Supine Figure 4 Piriformis Stretch - 2 x daily - 7 x weekly - 3 sets - 30 sec hold  Seated Shoulder Flexion Towel Slide at Table Top - 2 x daily - 7 x weekly - 3 sets - 10 reps  Seated Shoulder Abduction Towel Slide at Table Top - 2 x daily - 7 x weekly - 3 sets - 10 reps  Seated Scapular Retraction - 2 x daily - 7 x weekly - 3 sets - 10 reps - 5 sec hold  Supine Bridge - 2 x daily - 7 x weekly - 3 sets - 10 reps  Clamshell - 2 x daily - 7 x weekly - 3 sets - 10 reps       Pt. Education:  [x] Yes  [] No  [] Reviewed Prior HEP/Ed  Method of Education: [x] Verbal  [x] Demo  [x] Written  Comprehension of Education:  [x] Verbalizes understanding. [x] Demonstrates understanding. [] Needs review. [] Demonstrates/verbalizes HEP/Ed previously given. Plan: [] Continue current frequency toward long and short term goals.     [x] Specific Instructions for subsequent treatments:       Time In: 1:01pm            Time Out: 1:58pm     Electronically signed by:  Klaudia Mott PTA

## 2022-06-13 ENCOUNTER — HOSPITAL ENCOUNTER (OUTPATIENT)
Dept: PHYSICAL THERAPY | Facility: CLINIC | Age: 67
Setting detail: THERAPIES SERIES
Discharge: HOME | End: 2022-06-13
Payer: COMMERCIAL

## 2022-06-13 ENCOUNTER — TELEPHONE (OUTPATIENT)
Dept: FAMILY MEDICINE CLINIC | Age: 67
End: 2022-06-13

## 2022-06-13 PROCEDURE — 97110 THERAPEUTIC EXERCISES: CPT

## 2022-06-13 NOTE — TELEPHONE ENCOUNTER
[employer] faxed FMLA form to office, scanned in pt's chart and placed in Lindsey's box. Last seen in office 6/08/2022.

## 2022-06-13 NOTE — FLOWSHEET NOTE
[] [facility] [facility]  [address]  [phone]  F: [phone] [] [address]  [facility] 36   [address]  P: [phone]  F: [phone] [x] [place]  [facility]  [address]  P: [phone]  F: [phone] [] [address]  P: [phone]  F: [phone] [] [address]  [facility]   [address]   [place]: [phone]  F: [phone]      Physical Therapy Daily Treatment Note    Date:  2022  Patient Name:  Nicole Ordonez    :  1955  MRN: [id number]   Physician: Mary Grace Osorio MD                              Insurance: [facility] [employer] [place] [employer] ( visits approved, [facility] after  visit)   Medical Diagnosis:   Diagnosis   M47.817 (ICD-10-CM) - Lumbosacral spondylosis without myelopathy   M46.1 (ICD-10-CM) - Sacroiliitis ([facility] [address]   M25.511, G89.29 (ICD-10-CM) - Chronic right shoulder pain   M47.812 (ICD-10-CM) - Cervical spondylosis without myelopathy                             Rehab Codes: M25.511, M54.5, M79.1, M62.81  Onset Date: 2021                                 Next 's appt:  Dr. Ruben Richards     Visit# / total visits:     Cancels/No Shows: 0/1    Subjective:    Pain:  [x] Yes  [] No Location:(R) shoulder, low back        Pain Rating: (0-10 scale) 4/10 in (R) shoulder/neck region, 7/10 in low back    Pain altered Tx:  [x] No  [] Yes  Action:  Comments: Back has been hurting, doctor is not longer letting patient take ibuprofen as of last week due to stomach issues, now prescribed gabapentin but did not take it prior to therapy as it makes her tired . Having another round injections next week . Shoulder pain with elevation getting worse.     Objective:  Modalities:   Precautions: Pt can not lie on R side, L side only d/t R shoulder. Exercises:  Exercise Reps/ Time Weight/ Level Comments    Nustep  5'  L1  x          Pulleys    3',3'    flexion, scaption    UT S  30\"x3        Levator Scap S  30\"x3        HS S     Seated     Figure 4 S 30\"x3   Seated  x          SKTC 30\"x3  Bilaterally  x   DKTC 30\"x3  Discomfort after first set x   TA contraction 10x10\"   x   TA with UE 2x10     x   TA with LE mini march 2x10   x                 Wall slides  10x5\"  Flexion/Scaption               Scapular Retraction  15x5\"         Bridges  2x10        Clamshells  2x10    R only (painful to lie on R)            Other:         Treatment Charges: Mins Units   []  Modalities: HP      [x]  Ther Exercise 30 2   []  Manual Therapy     []  Ther Activities     []  Aquatics     []  Vasocompression     []  Other     Total Treatment time 30 2       Assessment: [] Progressing toward goals. [] No change. [x] Other: Able to preform more reps of DKTC than she was able to last visit, still painful but tolerable. Limited ROM R UE during TVA with UE, instructed patient to move within pain free range and focus on TA contraction. Cuing for breath work during [employer] activity. Patient reports feeling a little better at end of treatment compared to on arrival.         STG: (to be met in 6 treatments)  1. ? Pain: Patient will report pain as 4/10 within (R) shoulder and low back   2. ? ROM: Patient will improve lumbar AROM to 100% within all planes in order to increase ease of donning socks and shoes  3. Patient will improve (R) cervical SB to 40 deg in order to indicate improved UT length and increase scapulohumeral rhythm   4. Patient will improve (B) shoulder elevation AROM to 170 deg in order to increase ease of performing overhead tasks   5. Patient will demonstrate improved pelvic alignment   6. Patient to be independent with home exercise program as demonstrated by performance with correct form without cues.   LTG: (to be met in 15 treatments)  1. Patient will improve UEFS and THOMAS by 10% in order to indicate improved overall quality of life   2. Patient will improve (B) LE strength to 4+/5 in order to increase walking tolerance at work  3. Patient will improve (B) UE strength to 4+/5 in order to increase ease of performing lifting tasks  4. Patient will report pain as 2/10 within (R) shoulder and low back      Patient goals: \"To help pain and raise arm better\"      Pt. Education:  [x]? Plans/Goals, Risks/Benefits discussed  [x]? Home exercise program    Pt. Education:  [x] Yes  [] No  [] Reviewed Prior HEP/Ed  Method of Education: [x] Verbal  [] Demo  [] Written  Comprehension of Education:  [x] Verbalizes understanding. [x] Demonstrates understanding. [] Needs review. [] Demonstrates/verbalizes HEP/Ed previously given. Plan: [x] Continue current frequency toward long and short term goals.     [x] Specific Instructions for subsequent treatments: Cont per POC      Time In: 12:30 PM            Time Out: 1:15 PM     Electronically signed by:  Denita Mckay PTA

## 2022-06-13 NOTE — FLOWSHEET NOTE
[] [facility] ([facility]) [facility] &  Therapy  [address]    [phone]  F: [phone]   [] [address] [facility] 36   [address]  P: [phone]  F: [phone]  [] [address] &  Therapy  [address]  P: [phone]  F: [phone] [] [address]  P: [phone]  F: [phone]  [] [address]  [facility]   [address]   [place]: [phone]  F: [phone]   [] [address]  [place]: [phone]   F: [phone]     Physical Therapy Cancel/No Show note    Date: 2022  Patient: Michael Snyder  : 1955  MRN: [id number]    Cancels/No Shows to date:     For today's appointment patient:    [x]  Cancelled    [] Rescheduled appointment    [] No-show     Reason given by patient:    []  Patient ill    []  Conflicting appointment    [] No transportation      [] Conflict with work    [x] No reason given    [] Weather related    [] COVID-19    [] Other:      Comments:        [] Next appointment was confirmed    Electronically signed by: Kamla Collins

## 2022-06-15 ENCOUNTER — TELEPHONE (OUTPATIENT)
Dept: GASTROENTEROLOGY | Age: 67
End: 2022-06-15

## 2022-06-22 ENCOUNTER — HOSPITAL ENCOUNTER (OUTPATIENT)
Dept: PHYSICAL THERAPY | Facility: CLINIC | Age: 67
Setting detail: THERAPIES SERIES
Discharge: HOME | End: 2022-06-22
Payer: COMMERCIAL

## 2022-06-22 ENCOUNTER — OFFICE VISIT (OUTPATIENT)
Dept: NEUROSURGERY | Age: 67
End: 2022-06-22
Payer: COMMERCIAL

## 2022-06-22 VITALS
TEMPERATURE: 97.7 F | WEIGHT: 183 LBS | BODY MASS INDEX: 31.24 KG/M2 | DIASTOLIC BLOOD PRESSURE: 77 MMHG | SYSTOLIC BLOOD PRESSURE: 128 MMHG | OXYGEN SATURATION: 98 % | HEIGHT: 64 IN | HEART RATE: 90 BPM

## 2022-06-22 DIAGNOSIS — M47.818 ARTHROPATHY OF RIGHT SACROILIAC JOINT: Primary | ICD-10-CM

## 2022-06-22 DIAGNOSIS — S29.012A RHOMBOID MUSCLE STRAIN, INITIAL ENCOUNTER: ICD-10-CM

## 2022-06-22 PROCEDURE — 1123F ACP DISCUSS/DSCN MKR DOCD: CPT | Performed by: NEUROLOGICAL SURGERY

## 2022-06-22 PROCEDURE — 97110 THERAPEUTIC EXERCISES: CPT

## 2022-06-22 PROCEDURE — 99213 OFFICE O/P EST LOW 20 MIN: CPT | Performed by: NEUROLOGICAL SURGERY

## 2022-06-22 NOTE — FLOWSHEET NOTE
[] [facility] [address]  [address]  [phone]  F: [phone] [] [address]  [facility] 36   [address]  P: [phone]  F: [phone] [x] [place] &  Therapy  [address]  P: [phone]  F: [phone] [] [address]  P: [phone]  F: [phone] [] [address]  [facility]   [address]   [place]: [phone]  F: [phone]      Physical Therapy Daily Treatment Note    Date:  2022  Patient Name:  Trena Regan    :  1955  MRN: [id number]   Physician: Garnet Scheuermann, MD                              Insurance: Wade Hair ( visits approved, auth after  visit)   Medical Diagnosis:   Diagnosis   M47.817 (ICD-10-CM) - Lumbosacral spondylosis without myelopathy   M46.1 (ICD-10-CM) - Sacroiliitis ([facility] [address]   M25.511, G89.29 (ICD-10-CM) - Chronic right shoulder pain   M47.812 (ICD-10-CM) - Cervical spondylosis without myelopathy                             Rehab Codes: M25.511, M54.5, M79.1, M62.81  Onset Date: 2021                                 Next 's appt:  Dr. Yaquelin Lay     Visit# / total visits:     Cancels/No Shows: 0/1    Subjective:    Pain:  [x] Yes  [] No Location:(R) shoulder, low back        Pain Rating: (0-10 scale) 4/10 in (R) shoulder/neck region, 7/10 in low back    Pain altered Tx:  [x] No  [] Yes  Action:  Comments: Pt stated that her doctor told her that her shoulder is a muscular issue and she needs to work on stretches for her shoulder. Pt is going to receive her 2nd pain block for her LB tomorrow. Objective:  Modalities:   Precautions: Pt can not lie on R side, L side only d/t R shoulder.    Exercises:  Exercise Reps/ Time Weight/ Level Comments    Jamison Silvestre'  L1 x          Pulleys    3',3'    flexion, scaption    UT S  30\"x3        Levator Scap S  30\"x3        HS S     Seated     Figure 4 S 30\"x3   Seated            SKTC 30\"x3  Bilaterally  x   DKTC 30\"x3  Too uncomfortable today --   LTR 10x10\"   x   TA contraction 10x10\"       TA with UE 2x10        TA with LE mini march 2x10                    Wall slides  10x5\"  Flexion/Scaption               Scapular Retraction  15x5\"         Bridges  2x10        Clamshells  2x10    R only (painful to lie on R)            Other:         Treatment Charges: Mins Units   []  Modalities: HP      [x]  Ther Exercise 30 2   []  Manual Therapy     []  Ther Activities     []  Aquatics     []  Vasocompression     []  Other     Total Treatment time 30 2       Assessment: [] Progressing toward goals. [] No change. [x] Other:  Had pt begin on NuStep with both UE/LE but pt stopped using her UE within 2 minutes d/t her shoulder being too sore. Pt was set on a timer for pulleys but choose only to complete 20x in flexion and scaption. Unable to perform DKTC d/t increased LB discomfort. Ended session d/t lack of interventions that pt would participate. Pt does wish to look into dry needling at [facility] as previous improvements before. STG: (to be met in 6 treatments)  1. ? Pain: Patient will report pain as 4/10 within (R) shoulder and low back   2. ? ROM: Patient will improve lumbar AROM to 100% within all planes in order to increase ease of donning socks and shoes  3. Patient will improve (R) cervical SB to 40 deg in order to indicate improved UT length and increase scapulohumeral rhythm   4. Patient will improve (B) shoulder elevation AROM to 170 deg in order to increase ease of performing overhead tasks   5. Patient will demonstrate improved pelvic alignment   6. Patient to be independent with home exercise program as demonstrated by performance with correct form without cues. LTG: (to be met in 12 treatments)  1.  Patient will improve UEFS and THOMAS by 10% in order to indicate improved overall quality of life   2. Patient will improve (B) LE strength to 4+/5 in order to increase walking tolerance at work  3. Patient will improve (B) UE strength to 4+/5 in order to increase ease of performing lifting tasks  4. Patient will report pain as 2/10 within (R) shoulder and low back      Patient goals: \"To help pain and raise arm better\"      Pt. Education:  [x]? Plans/Goals, Risks/Benefits discussed  [x]? Home exercise program    Pt. Education:  [x] Yes  [] No  [] Reviewed Prior HEP/Ed  Method of Education: [x] Verbal  [] Demo  [] Written  Comprehension of Education:  [x] Verbalizes understanding. [x] Demonstrates understanding. [] Needs review. [] Demonstrates/verbalizes HEP/Ed previously given. Plan: [x] Continue current frequency toward long and short term goals.     [x] Specific Instructions for subsequent treatments: Cont per POC      Time In: 10:00am            Time Out: 10:40am     Electronically signed by:  Andrew Zimmer PTA

## 2022-06-22 NOTE — PROGRESS NOTES
[address]  [facility] [address], [address]  Dept: [phone]    Patient:  Tarun Mart  YOB: 1955  Date: 6/22/22    The patient is a 77 y.o. female who presents today for consult of the following problems:     Chief Complaint   Patient presents with    Follow-up             HPI:     Tarun Mart is a 77 y.o. female on whom neurosurgical consultation was requested by Tiffanie Wong DO for management of back pain moderate on daily basis with 50 % relief with SIJ injection. Has seen ortho and PT with R rhomboid and trapezial strain. Improvement in the R periscapular region after PT last year. Has been doing home stretches and strengthening that area at home. History:     Past Medical History:   Diagnosis Date    Constipation     Diverticulitis     Diverticulosis 2009    Diverticulosis     Fibroids     GERD (gastroesophageal reflux disease)     Glaucoma 2011    Hay fever     Hiatal hernia     Irritable bowel disease     Kidney infection     Lumbar stenosis     MVP (mitral valve prolapse)      Past Surgical History:   Procedure Laterality Date    COLONOSCOPY      FOOT SURGERY Left     HYSTERECTOMY (CERVIX STATUS UNKNOWN)      RSO,LS, previously had tubal with LO    LYMPH NODE BIOPSY Right 11/17/2010    axilla    OVARY SURGERY  1990's    mass and ovary removed    ABDIEL AND BSO (CERVIX REMOVED)  04/02/2009    Extended abdominal hyst w/rt salpingo-ooph, lt salpingectomy. lysis of extensive small and large intestinal adhesions. right and left ureterolysis. lysis of pelvic adhesions with retroperitoneal exploration. procurement peritoneal cytology.      TONSILLECTOMY AND ADENOIDECTOMY  1973    TUMOR REMOVAL      left foot     Family History   Problem Relation Age of Onset    Breast Cancer Sister     Diabetes Sister    [facility] Diabetes Mother  Hypertension Mother     Tuberculosis Mother     Obesity Mother     Heart Disease Father     Arthritis Father     Diabetes Sister     Diabetes Sister     Colon Cancer Maternal Grandmother     Stroke Other     Kidney Disease Other      Current Outpatient Medications on File Prior to Visit   Medication Sig Dispense Refill    gabapentin (NEURONTIN) 300 MG capsule TAKE 1 CAPSULE TWICE DAILY 90 capsule 1    acetaminophen (TYLENOL) 500 MG tablet Take 2 tablets by mouth 3 times daily 180 tablet 1    lidocaine (LIDODERM) 5 % Place 1 patch onto the skin daily 12 hours on, 12 hours off. 30 patch 2    diclofenac sodium (VOLTAREN) 1 % GEL Apply 4 g topically 4 times daily as needed for Pain 150 g 3    EPINEPHrine (EPIPEN 2-ANSON) 0.3 MG/0.3ML SOAJ injection Inject 0.3 mLs into the muscle once for 1 dose Use as directed for allergic reaction 0.3 mL 0    albuterol sulfate  (90 Base) MCG/ACT inhaler Inhale 2 puffs into the lungs every 6 hours as needed for Wheezing 1 Inhaler 1    cetirizine (ZYRTEC) 10 MG tablet Take 10 mg by mouth daily.  Multiple Vitamin (MULTI-VITAMIN DAILY PO) Take  by mouth.  Mometasone Furoate (NASONEX NA) by Nasal route.  LUMIGAN 0.01 % SOLN Place 1 drop into both eyes nightly.  mirabegron (MYRBETRIQ) 50 MG TB24 Take 50 mg by mouth daily (Patient not taking: Reported on 5/19/2022)       Current Facility-Administered Medications on File Prior to Visit   Medication Dose Route Frequency Provider Last Rate Last Admin    bupivacaine (MARCAINE) 0.25 % injection 5 mg  2 mL Intra-artICUlar Once [place], DO        methylPREDNISolone acetate (DEPO-MEDROL) injection 80 mg  80 mg Intra-artICUlar Once [place], DO         Social History     Tobacco Use    Smoking status: Never Smoker    Smokeless tobacco: Never Used   Vaping Use    Vaping Use: Never used   Substance Use Topics    Alcohol use: Not Currently     Comment: socially    Drug use:  No Allergies   Allergen Reactions    Cat Hair Extract Anaphylaxis and Swelling    Codeine     Eggs Or Egg-Derived Products [Eggs Or Egg-Derived Products] Diarrhea       Review of Systems  ROS: back pain. Shoulder pain    Physical Exam:      /77 (Site: Left Upper Arm, Position: Sitting, Cuff Size: Medium Adult)   Pulse 90   Temp 97.7 °F (36.5 °C)   Ht 5' 4\" (1.626 m)   Wt 183 lb (83 kg)   LMP 04/05/2009   SpO2 98%   BMI 31.41 kg/m²   Estimated body mass index is 31.41 kg/m² as calculated from the following:    Height as of this encounter: 5' 4\" (1.626 m). Weight as of this encounter: 183 lb (83 kg). General:  Lucille Avilez is a 77y.o. year old female who appears her stated age. HEENT: Normocephalic atraumatic. Neck supple. Chest: regular rate; pulses equal. Equal chest rise and fall  Abdomen: Soft nondistended. Ext: DP equal with good capillary refill  Neuro    Mentation  Appropriate affect   oriented    Cranial Nerves:   Pupils equal and reactive to light  Extraocular motion intact  Face symmetric  No dysarthria  v1-3 sensation symmetric, masseter tone symmetric  Hearing symmetric and intact to finger rub    Sensation:   intact    Motor  L deltoid 5/5; R deltoid 5/5  L biceps 5/5; R biceps 5/5  L triceps 5/5; R triceps 5/5  L wrist extension 5/5; R wrist extension 5/5  L intrinsics 5/5; R intrinsics 5/5     L iliopsoas 5/5 , R iliopsoas 5/5  L quadriceps 5/5; R quadriceps 5/5  L Dorsiflexion 5/5; R dorsiflexion 5/5  L Plantarflexion 5/5; R plantarflexion 5/5  L EHL 5/5; R EHL 5/5    Reflexes  L Brachioradialis 2+/4; R brachioradialis 2+/4  L Biceps 2+/4; R Biceps 2+/4  L Triceps 2+/4; R Triceps 2+/4  L Patellar 2+/4: R Patellar 2+/4  L Achilles 2+/4; R Achilles 2+/4    hoffmans L: neg  hoffmans R: neg  Clonus L: neg  Clonus R: neg  Babinski L: up  Babinski R; up    Neg leilani. + TTP over SIJ  Studies Review:     none    Assessment and Plan:      1.  Arthropathy of right sacroiliac joint    2. Rhomboid muscle strain, initial encounter          Plan:   Continue with 2nd SIJ and ablate as appropriate  R rhomboid strain - instructed to talk to PT about stretches/strengthening    Followup: No follow-ups on file. Prescriptions Ordered:  No orders of the defined types were placed in this encounter. Orders Placed:  No orders of the defined types were placed in this encounter. Electronically signed by Rocio Damian DO on 6/22/2022 at 9:23 AM    Please note that this chart was generated using voice recognition Dragon dictation software. Although every effort was made to ensure the accuracy of this automated transcription, some errors in transcription may have occurred.

## 2022-06-23 ENCOUNTER — HOSPITAL ENCOUNTER (OUTPATIENT)
Dept: PAIN MANAGEMENT | Facility: CLINIC | Age: 67
Discharge: HOME | End: 2022-06-23
Payer: COMMERCIAL

## 2022-06-23 VITALS
WEIGHT: 183 LBS | RESPIRATION RATE: 10 BRPM | HEART RATE: 79 BPM | DIASTOLIC BLOOD PRESSURE: 63 MMHG | BODY MASS INDEX: 31.24 KG/M2 | SYSTOLIC BLOOD PRESSURE: 143 MMHG | TEMPERATURE: 97.8 F | OXYGEN SATURATION: 100 % | HEIGHT: 64 IN

## 2022-06-23 DIAGNOSIS — R52 PAIN MANAGEMENT: ICD-10-CM

## 2022-06-23 PROCEDURE — 27096 INJECT SACROILIAC JOINT: CPT | Performed by: PAIN MEDICINE

## 2022-06-23 PROCEDURE — 6360000002 HC RX W HCPCS: Performed by: PAIN MEDICINE

## 2022-06-23 PROCEDURE — 2580000003 HC RX 258: Performed by: PAIN MEDICINE

## 2022-06-23 PROCEDURE — 2500000003 HC RX 250 WO HCPCS: Performed by: PAIN MEDICINE

## 2022-06-23 PROCEDURE — G0260 INJ FOR SACROILIAC JT ANESTH: HCPCS

## 2022-06-23 RX ORDER — BUPIVACAINE HYDROCHLORIDE 2.5 MG/ML
INJECTION, SOLUTION EPIDURAL; INFILTRATION; INTRACAUDAL
Status: COMPLETED | OUTPATIENT
Start: 2022-06-23 | End: 2022-06-23

## 2022-06-23 RX ORDER — DEXAMETHASONE SODIUM PHOSPHATE 10 MG/ML
INJECTION, SOLUTION INTRAMUSCULAR; INTRAVENOUS
Status: COMPLETED | OUTPATIENT
Start: 2022-06-23 | End: 2022-06-23

## 2022-06-23 RX ORDER — MIDAZOLAM HYDROCHLORIDE 2 MG/2ML
INJECTION, SOLUTION INTRAMUSCULAR; INTRAVENOUS
Status: COMPLETED | OUTPATIENT
Start: 2022-06-23 | End: 2022-06-23

## 2022-06-23 RX ORDER — LIDOCAINE HYDROCHLORIDE 10 MG/ML
INJECTION, SOLUTION EPIDURAL; INFILTRATION; INTRACAUDAL; PERINEURAL
Status: COMPLETED | OUTPATIENT
Start: 2022-06-23 | End: 2022-06-23

## 2022-06-23 RX ORDER — SODIUM CHLORIDE 0.9 % (FLUSH) 0.9 %
SYRINGE (ML) INJECTION
Status: COMPLETED | OUTPATIENT
Start: 2022-06-23 | End: 2022-06-23

## 2022-06-23 RX ADMIN — Medication 3 ML: at 11:55

## 2022-06-23 RX ADMIN — LIDOCAINE HYDROCHLORIDE 3 ML: 10 INJECTION, SOLUTION EPIDURAL; INFILTRATION; INTRACAUDAL at 11:55

## 2022-06-23 RX ADMIN — BUPIVACAINE HYDROCHLORIDE 3 ML: 2.5 INJECTION, SOLUTION EPIDURAL; INFILTRATION; INTRACAUDAL; PERINEURAL at 11:56

## 2022-06-23 RX ADMIN — DEXAMETHASONE SODIUM PHOSPHATE 10 MG: 10 INJECTION, SOLUTION INTRAMUSCULAR; INTRAVENOUS at 11:57

## 2022-06-23 RX ADMIN — MIDAZOLAM HYDROCHLORIDE 2 MG: 1 INJECTION, SOLUTION INTRAMUSCULAR; INTRAVENOUS at 11:52

## 2022-06-23 ASSESSMENT — PAIN - FUNCTIONAL ASSESSMENT
PAIN_FUNCTIONAL_ASSESSMENT: 0-10
PAIN_FUNCTIONAL_ASSESSMENT: NONE - DENIES PAIN

## 2022-06-23 NOTE — H&P
Pain Pre-Op H&P Note    Lacho Landaverde MD    HPI: Cali Telles  presents with back pain. Past Medical History:   Diagnosis Date    Constipation     Diverticulitis     Diverticulosis 2009    Diverticulosis     Fibroids     GERD (gastroesophageal reflux disease)     Glaucoma 2011    Hay fever     Hiatal hernia     Irritable bowel disease     Kidney infection     Lumbar stenosis     MVP (mitral valve prolapse)        Past Surgical History:   Procedure Laterality Date    COLONOSCOPY      FOOT SURGERY Left     HYSTERECTOMY (CERVIX STATUS UNKNOWN)      RSO,LS, previously had tubal with LO    LYMPH NODE BIOPSY Right 11/17/2010    axilla    OVARY SURGERY  1990's    mass and ovary removed    ABDIEL AND BSO (CERVIX REMOVED)  04/02/2009    Extended abdominal hyst w/rt salpingo-ooph, lt salpingectomy. lysis of extensive small and large intestinal adhesions. right and left ureterolysis. lysis of pelvic adhesions with retroperitoneal exploration. procurement peritoneal cytology.      TONSILLECTOMY AND ADENOIDECTOMY  1973    TUMOR REMOVAL      left foot       Family History   Problem Relation Age of Onset    Breast Cancer Sister     Diabetes Sister     Diabetes Mother     Hypertension Mother     Tuberculosis Mother     Obesity Mother     Heart Disease Father     Arthritis Father     Diabetes Sister     Diabetes Sister     Colon Cancer Maternal Grandmother     Stroke Other     Kidney Disease Other        Allergies   Allergen Reactions    Latex      Swelling/rash    Cat Hair Extract Anaphylaxis and Swelling    Codeine     Eggs Or Egg-Derived Products [Eggs Or Egg-Derived Products] Diarrhea         Current Outpatient Medications:     gabapentin (NEURONTIN) 300 MG capsule, TAKE 1 CAPSULE TWICE DAILY, Disp: 90 capsule, Rfl: 1    acetaminophen (TYLENOL) 500 MG tablet, Take 2 tablets by mouth 3 times daily, Disp: 180 tablet, Rfl: 1    lidocaine (LIDODERM) 5 %, Place 1 patch onto the skin daily 12 hours on, 12 hours off., Disp: 30 patch, Rfl: 2    diclofenac sodium (VOLTAREN) 1 % GEL, Apply 4 g topically 4 times daily as needed for Pain, Disp: 150 g, Rfl: 3    EPINEPHrine (EPIPEN 2-ANSON) 0.3 MG/0.3ML SOAJ injection, Inject 0.3 mLs into the muscle once for 1 dose Use as directed for allergic reaction, Disp: 0.3 mL, Rfl: 0    albuterol sulfate  (90 Base) MCG/ACT inhaler, Inhale 2 puffs into the lungs every 6 hours as needed for Wheezing, Disp: 1 Inhaler, Rfl: 1    mirabegron (MYRBETRIQ) 50 MG TB24, Take 50 mg by mouth daily (Patient not taking: Reported on 5/19/2022), Disp: , Rfl:     cetirizine (ZYRTEC) 10 MG tablet, Take 10 mg by mouth daily. , Disp: , Rfl:     Multiple Vitamin (MULTI-VITAMIN DAILY PO), Take  by mouth., Disp: , Rfl:     Mometasone Furoate (NASONEX NA), by Nasal route., Disp: , Rfl:     LUMIGAN 0.01 % SOLN, Place 1 drop into both eyes nightly., Disp: , Rfl:     Current Facility-Administered Medications:     bupivacaine (MARCAINE) 0.25 % injection 5 mg, 2 mL, Intra-artICUlar, Once, Cali Anderson DO    methylPREDNISolone acetate (DEPO-MEDROL) injection 80 mg, 80 mg, Intra-artICUlar, Once, Ping Ahn DO    Social History     Tobacco Use    Smoking status: Never Smoker    Smokeless tobacco: Never Used   Substance Use Topics    Alcohol use: Not Currently     Comment: socially       Review of Systems:   Focused review of systems was performed, and negative as pertinent to diagnosis, except as stated in HPI. Physical Exam  Constitutional:       Appearance: Normal appearance. Pulmonary:      Effort: Pulmonary effort is normal.   Neurological:      Mental Status: alert. Psychiatric:         Attention and Perception: Attention and perception normal.         Mood and Affect: Mood and affect normal.   Cardiovascular:      Rate: Normal rate.          ASA: 3          Mallampati: 2       Patient's current physical status, medications, medical history,

## 2022-06-23 NOTE — OP NOTE
Sacro-Iliac Joint Injection:  SURGEON: Xiomara Sosa MD    PRE-OP DIAGNOSIS: Sacroiliitis (M46.1), Low back pain (M54.5)    POST-OP DIAGNOSIS: Same. Procedure performed: Bilateral Sacroiliac Joint Injection. Physician confirmed and marked the surgical site. EBL: minimal    CONSENT: Patient has undergone the educational process with this procedure, is aware and fully understands the risks involved: potential damage to any and all body organs including possible bleeding, infection, and nerve injury, allergic reaction and headache. Patient also understands that the procedure will be undertaken in a safe, controlled and monitored setting. Patient recognizes that the benefits may include relief from pain and reduction in the oral use of medications. Patient agreed to proceed. The patient was counseled at length about the risks of eduardo Covid-19 during their perioperative period and any recovery window from their procedure. The patient was made aware that eduardo Covid-19  may worsen their prognosis for recovering from their procedure  and lend to a higher morbidity and/or mortality risk. All material risks, benefits, and reasonable alternatives including postponing the procedure were discussed. The patient does wish to proceed with the procedure at this time. PREP: The patient's back was prepped with chloroprep and draped appropriately. 5ml of 0.5% lidocaine was used to anesthetize the skin and subcutaneous tissue. PROCEDURE NOTE: A 25 gauge 3.5 inch spinal needle was advanced to the  Bilateral SI joint under fluoroscopic guidance. Aspiration was negative. 2ml of  0.25% bupivacaine was mixed with 5mg Dexamethasone and slowly injected into the joint(s). The needle was withdrawn by the physician and the nurse applied a sterile dressing. The patient tolerated the procedure well. No complications occurred. Patient transferred to the recovery room in satisfactory condition.  Appropriate written discharge instructions given to the patient.       Kaylee Lua MD

## 2022-06-24 ENCOUNTER — TELEPHONE (OUTPATIENT)
Dept: FAMILY MEDICINE CLINIC | Age: 67
End: 2022-06-24

## 2022-06-24 NOTE — TELEPHONE ENCOUNTER
Patient would like for Dr. José Miguel Gonzales to call her regarding her FMLA forms, please advise.

## 2022-06-27 NOTE — TELEPHONE ENCOUNTER
Please return a call to the patient - offer an appointment with first available PGY Resident at the [facility]. office Verner Foster). We will process her FMLA forms.     Santi Soliz,

## 2022-06-28 NOTE — TELEPHONE ENCOUNTER
Pt had contacted office wanted to know if we could fax the FMLA to patient to have her sign her part. Informed patient she can sign at [facility] on 6-29-22 for her FMLA.

## 2022-06-29 ENCOUNTER — OFFICE VISIT (OUTPATIENT)
Dept: FAMILY MEDICINE CLINIC | Age: 67
End: 2022-06-29
Payer: COMMERCIAL

## 2022-06-29 VITALS
HEART RATE: 80 BPM | TEMPERATURE: 96.8 F | BODY MASS INDEX: 31.1 KG/M2 | HEIGHT: 64 IN | SYSTOLIC BLOOD PRESSURE: 129 MMHG | DIASTOLIC BLOOD PRESSURE: 84 MMHG | WEIGHT: 182.2 LBS

## 2022-06-29 DIAGNOSIS — M12.811 ROTATOR CUFF ARTHROPATHY OF RIGHT SHOULDER: Primary | ICD-10-CM

## 2022-06-29 PROCEDURE — 99213 OFFICE O/P EST LOW 20 MIN: CPT | Performed by: STUDENT IN AN ORGANIZED HEALTH CARE EDUCATION/TRAINING PROGRAM

## 2022-06-29 PROCEDURE — 1123F ACP DISCUSS/DSCN MKR DOCD: CPT | Performed by: STUDENT IN AN ORGANIZED HEALTH CARE EDUCATION/TRAINING PROGRAM

## 2022-06-29 ASSESSMENT — ENCOUNTER SYMPTOMS
VOMITING: 0
WHEEZING: 0
SHORTNESS OF BREATH: 0
NAUSEA: 0
DIARRHEA: 0
BACK PAIN: 1
COUGH: 0
ABDOMINAL PAIN: 0

## 2022-06-29 NOTE — PROGRESS NOTES
Visit Information    Have you changed or started any medications since your last visit including any over-the-counter medicines, vitamins, or herbal medicines? no   Have you stopped taking any of your medications? Is so, why? -  no  Are you having any side effects from any of your medications? - no    Have you seen any other physician or provider since your last visit? Yes,PT, Neurosurgery, Pain Management, Ortho  Have you had any other diagnostic tests since your last visit? Yes, XR   Have you been seen in the emergency room and/or had an admission in a hospital since we last saw you?  no   Have you had your routine dental cleaning in the past 6 months?  no     Do you have an active MyChart account? If no, what is the barrier?   Yes    Patient Care Team:  Eran Martinez,  as PCP - [address] [place] [address], DO as PCP - [facility]    Medical History Review  Past Medical, Family, and Social History reviewed and does contribute to the patient presenting condition    Health Maintenance   Topic Date Due    Hepatitis C screen  Never done    DTaP/Tdap/Td vaccine (1 - Tdap) Never done    Shingles vaccine (1 of 2) Never done    Pneumococcal 65+ years Vaccine (1 - PCV) Never done    Breast cancer screen  06/09/2021    Colorectal Cancer Screen  04/08/2023    A1C test (Diabetic or Prediabetic)  06/08/2023    Depression Screen  06/08/2023    Lipids  12/17/2026    DEXA (modify frequency per FRAX score)  Completed    Flu vaccine  Completed    COVID-19 Vaccine  Completed    Hepatitis A vaccine  Aged Out    Hepatitis B vaccine  Aged Out    Hib vaccine  Aged Out    Meningococcal (ACWY) vaccine  Aged Out

## 2022-06-29 NOTE — PROGRESS NOTES
Attending Physician Statement    Wt Readings from Last 3 Encounters:   06/29/22 182 lb 3.2 oz (82.6 kg)   06/23/22 183 lb (83 kg)   06/22/22 183 lb (83 kg)     Temp Readings from Last 3 Encounters:   06/29/22 96.8 °F (36 °C) (Temporal)   06/23/22 97.8 °F (36.6 °C) (Temporal)   06/22/22 97.7 °F (36.5 °C)     BP Readings from Last 3 Encounters:   06/29/22 129/84   06/23/22 (!) 143/63   06/22/22 128/77     Pulse Readings from Last 3 Encounters:   06/29/22 80   06/23/22 79   06/22/22 90         I have discussed the care of Isrrael Doherty, including pertinent history and exam findings with the resident. I have reviewed the key elements of all parts of the encounter with the resident. I agree with the assessment, plan and orders as documented by the resident.   (GE Modifier)
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
## 2022-06-29 NOTE — PATIENT INSTRUCTIONS
Thank you for letting us take care of you today. We hope all your questions were addressed. If a question was overlooked or something else comes to mind after you return home, please contact a member of your Care Team listed below. Your Care Team at [place] [address] is Team #2  Taryn Coley DO (Faculty)  [place] (Faculty)  Rocio Soler MD (Resident)  Colonel Kalani MD (Resident)  Tucker Alvarez MD (Resident)  Piotr Olvera MD (Resident)  Anjel Cleveland., DARELL Torres.,  JAI Corea., FAITH Morales., [facility] office)  Ines Randle, [address] (Clinical Practice Manager)  Mai Garcia [facility] (Clinical Pharmacist)     Office phone number: [phone]    If you need to get in right away due to illness, please be advised we have \"Same Day\" appointments available Monday-Friday. Please call us at [phone] option #3 to schedule your \"Same Day\" appointment.

## 2022-07-05 NOTE — TELEPHONE ENCOUNTER
Writer called pt to schedule colon per referral, pt did not answer, Rush La asking pt to call the office to schedule colon.     3rd and final attempt; called patient and LVM regarding colon screen referral.  Sending referral back to provider, three attempts have been made to schedule referral.

## 2022-07-07 ENCOUNTER — HOSPITAL ENCOUNTER (OUTPATIENT)
Dept: MAMMOGRAPHY | Age: 67
Discharge: HOME | End: 2022-07-09
Payer: COMMERCIAL

## 2022-07-07 ENCOUNTER — TELEPHONE (OUTPATIENT)
Dept: FAMILY MEDICINE CLINIC | Age: 67
End: 2022-07-07

## 2022-07-07 ENCOUNTER — CLINICAL DOCUMENTATION (OUTPATIENT)
Dept: FAMILY MEDICINE CLINIC | Age: 67
End: 2022-07-07

## 2022-07-07 DIAGNOSIS — Z12.31 ENCOUNTER FOR SCREENING MAMMOGRAM FOR MALIGNANT NEOPLASM OF BREAST: ICD-10-CM

## 2022-07-07 DIAGNOSIS — M19.011 ARTHROPATHY OF RIGHT SHOULDER: ICD-10-CM

## 2022-07-07 DIAGNOSIS — M47.818 ARTHROPATHY OF RIGHT SACROILIAC JOINT: Primary | ICD-10-CM

## 2022-07-07 PROCEDURE — 77063 BREAST TOMOSYNTHESIS BI: CPT

## 2022-07-07 NOTE — PROGRESS NOTES
No chief complaint on file. Documentation update for FMLA completion. LMP 04/05/2009       Review of Systems      Physical Exam      ASSESSMENT AND PLAN      Diagnosis Orders   1. Arthropathy of right sacroiliac joint     2.  Arthropathy of right shoulder           Electronicallysigned by Jose Aj DO on 7/7/2022 at 1:06 PM

## 2022-07-12 ENCOUNTER — TELEPHONE (OUTPATIENT)
Dept: FAMILY MEDICINE CLINIC | Age: 67
End: 2022-07-12

## 2022-07-12 NOTE — TELEPHONE ENCOUNTER
Patient called because she has an MRI scheduled for tomorrow and would like something to keep her calm if possible. She would also like a letter stating she can not wear a mask do to being claustrophobic. Please advise.

## 2022-07-15 ENCOUNTER — HOSPITAL ENCOUNTER (OUTPATIENT)
Dept: MRI IMAGING | Age: 67
Discharge: HOME | End: 2022-07-17
Payer: COMMERCIAL

## 2022-07-15 ENCOUNTER — TELEPHONE (OUTPATIENT)
Dept: FAMILY MEDICINE CLINIC | Age: 67
End: 2022-07-15

## 2022-07-15 DIAGNOSIS — F40.240 CLAUSTROPHOBIA: Primary | ICD-10-CM

## 2022-07-15 DIAGNOSIS — M12.811 ROTATOR CUFF ARTHROPATHY OF RIGHT SHOULDER: ICD-10-CM

## 2022-07-15 PROCEDURE — 73221 MRI JOINT UPR EXTREM W/O DYE: CPT

## 2022-07-15 RX ORDER — LORAZEPAM 1 MG/1
1 TABLET ORAL ONCE
Qty: 1 TABLET | Refills: 0 | Status: SHIPPED | OUTPATIENT
Start: 2022-07-15 | End: 2022-07-15

## 2022-07-15 NOTE — TELEPHONE ENCOUNTER
Patient is calling requesting medication for her MRI schedule for today at noon, she want you to send medication iover to [facility] [address].
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
## 2022-07-18 ENCOUNTER — OFFICE VISIT (OUTPATIENT)
Dept: ORTHOPEDIC SURGERY | Age: 67
End: 2022-07-18
Payer: COMMERCIAL

## 2022-07-18 VITALS
HEIGHT: 64 IN | WEIGHT: 182 LBS | DIASTOLIC BLOOD PRESSURE: 78 MMHG | RESPIRATION RATE: 12 BRPM | BODY MASS INDEX: 31.07 KG/M2 | HEART RATE: 74 BPM | SYSTOLIC BLOOD PRESSURE: 146 MMHG

## 2022-07-18 DIAGNOSIS — M75.101 ROTATOR CUFF SYNDROME, RIGHT: Primary | ICD-10-CM

## 2022-07-18 DIAGNOSIS — M75.21 BICEPS TENDINITIS OF RIGHT SHOULDER: ICD-10-CM

## 2022-07-18 DIAGNOSIS — M25.511 TRIGGER POINT OF RIGHT SHOULDER REGION: ICD-10-CM

## 2022-07-18 PROCEDURE — 20550 NJX 1 TENDON SHEATH/LIGAMENT: CPT | Performed by: PHYSICIAN ASSISTANT

## 2022-07-18 PROCEDURE — 20552 NJX 1/MLT TRIGGER POINT 1/2: CPT | Performed by: PHYSICIAN ASSISTANT

## 2022-07-18 RX ORDER — LIDOCAINE HYDROCHLORIDE 10 MG/ML
4 INJECTION, SOLUTION INFILTRATION; PERINEURAL ONCE
Status: COMPLETED | OUTPATIENT
Start: 2022-07-18 | End: 2022-07-18

## 2022-07-18 RX ORDER — METHYLPREDNISOLONE ACETATE 40 MG/ML
40 INJECTION, SUSPENSION INTRA-ARTICULAR; INTRALESIONAL; INTRAMUSCULAR; SOFT TISSUE ONCE
Status: COMPLETED | OUTPATIENT
Start: 2022-07-18 | End: 2022-07-18

## 2022-07-18 RX ORDER — METHYLPREDNISOLONE ACETATE 40 MG/ML
80 INJECTION, SUSPENSION INTRA-ARTICULAR; INTRALESIONAL; INTRAMUSCULAR; SOFT TISSUE ONCE
Status: COMPLETED | OUTPATIENT
Start: 2022-07-18 | End: 2022-07-18

## 2022-07-18 RX ADMIN — METHYLPREDNISOLONE ACETATE 80 MG: 40 INJECTION, SUSPENSION INTRA-ARTICULAR; INTRALESIONAL; INTRAMUSCULAR; SOFT TISSUE at 10:28

## 2022-07-18 RX ADMIN — METHYLPREDNISOLONE ACETATE 40 MG: 40 INJECTION, SUSPENSION INTRA-ARTICULAR; INTRALESIONAL; INTRAMUSCULAR; SOFT TISSUE at 10:28

## 2022-07-18 RX ADMIN — LIDOCAINE HYDROCHLORIDE 4 ML: 10 INJECTION, SOLUTION INFILTRATION; PERINEURAL at 10:20

## 2022-07-18 ASSESSMENT — ENCOUNTER SYMPTOMS
CHEST TIGHTNESS: 0
VOMITING: 0
SHORTNESS OF BREATH: 0
RESPIRATORY NEGATIVE: 1
APNEA: 0
ABDOMINAL PAIN: 0
COLOR CHANGE: 0
COUGH: 0
ABDOMINAL DISTENTION: 0
DIARRHEA: 0
NAUSEA: 0
CONSTIPATION: 0

## 2022-08-08 ENCOUNTER — OFFICE VISIT (OUTPATIENT)
Dept: FAMILY MEDICINE CLINIC | Age: 67
End: 2022-08-08
Payer: COMMERCIAL

## 2022-08-08 VITALS
TEMPERATURE: 96.8 F | DIASTOLIC BLOOD PRESSURE: 78 MMHG | HEIGHT: 64 IN | SYSTOLIC BLOOD PRESSURE: 139 MMHG | WEIGHT: 180.6 LBS | BODY MASS INDEX: 30.83 KG/M2 | HEART RATE: 77 BPM

## 2022-08-08 DIAGNOSIS — F41.9 ANXIETY: Primary | ICD-10-CM

## 2022-08-08 PROCEDURE — 99213 OFFICE O/P EST LOW 20 MIN: CPT | Performed by: FAMILY MEDICINE

## 2022-08-08 PROCEDURE — 1123F ACP DISCUSS/DSCN MKR DOCD: CPT | Performed by: FAMILY MEDICINE

## 2022-08-08 RX ORDER — BUSPIRONE HYDROCHLORIDE 10 MG/1
10 TABLET ORAL 2 TIMES DAILY
Qty: 60 TABLET | Refills: 3 | Status: SHIPPED | OUTPATIENT
Start: 2022-08-08 | End: 2022-12-06

## 2022-08-08 NOTE — PROGRESS NOTES
Visit Information    Have you changed or started any medications since your last visit including any over-the-counter medicines, vitamins, or herbal medicines? no   Have you stopped taking any of your medications? Is so, why? -  no  Are you having any side effects from any of your medications? - no    Have you seen any other physician or provider since your last visit? yes - Orthopedic   Have you had any other diagnostic tests since your last visit? yes - MRI   Have you been seen in the emergency room and/or had an admission in a hospital since we last saw you?  no   Have you had your routine dental cleaning in the past 6 months?  no     Do you have an active MyChart account? If no, what is the barrier?   Yes    Patient Care Team:  Caridad Salguero,  as PCP - [address] [place] [address], DO as PCP - [facility] Provider    Medical History Review  Past Medical, Family, and Social History reviewed and does not contribute to the patient presenting condition    Health Maintenance   Topic Date Due    Hepatitis C screen  Never done    DTaP/Tdap/Td vaccine (1 - Tdap) Never done    Shingles vaccine (1 of 2) Never done    Pneumococcal 65+ years Vaccine (1 - PCV) Never done    COVID-19 Vaccine (4 - Booster for Pedersen Peter series) 04/28/2022    Flu vaccine (1) 09/01/2022    Colorectal Cancer Screen  04/08/2023    A1C test (Diabetic or Prediabetic)  06/08/2023    Depression Screen  06/08/2023    Breast cancer screen  07/07/2023    Lipids  12/17/2026    DEXA (modify frequency per FRAX score)  Completed    Hepatitis A vaccine  Aged Out    Hepatitis B vaccine  Aged Out    Hib vaccine  Aged Out    Meningococcal (ACWY) vaccine  Aged Out

## 2022-08-08 NOTE — PATIENT INSTRUCTIONS
Thank you for letting us take care of you today. We hope all your questions were addressed. If a question was overlooked or something else comes to mind after you return home, please contact a member of your Care Team listed below. Your Care Team at [place] [address] is Team #2  Jae Tello DO (Faculty)  Dayton Christensen (Faculty)  Esperanza Davies MD (Resident)  Lou Forte MD (Resident)  Tito Prader, MD (Resident)  Eusebio Billings MD (Resident)  Esther Chow., DARELL Mar.,  JAI Ferguson., FAITH Lanza., Kita Leventhal [facility] office)  Laurent Lindsey, [address] (Clinical Practice Manager)  Crista Beavers, [facility] (Clinical Pharmacist)     Office phone number: [phone]    If you need to get in right away due to illness, please be advised we have \"Same Day\" appointments available Monday-Friday. Please call us at [phone] option #3 to schedule your \"Same Day\" appointment.
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
## 2022-08-16 ENCOUNTER — TELEPHONE (OUTPATIENT)
Dept: FAMILY MEDICINE CLINIC | Age: 67
End: 2022-08-16

## 2022-08-16 NOTE — TELEPHONE ENCOUNTER
Patient called office asking for a new referral to sports medicine. Dr. Claire Howell is no longer with [address] [facility], and patient no showed to her scheduled appointment with [address] [place] [address], and now need new referral to some where else. I believe Dr. Claire Howell went to [facility]. Please advise.

## 2022-08-18 NOTE — TELEPHONE ENCOUNTER
Spoke with patient - there was not a sports medicine physician with [facility] with that name, Tab Royal is getting more information and will contact patient when available at [phone]

## 2022-08-18 NOTE — TELEPHONE ENCOUNTER
Called patient and informed her that she can follow up with Dr. Amanuel Rawls at the [facility], that he is taking patient's there. Currently there is no Sport Medicaine Doctor with [facility]. Patient will contact [facility].

## 2022-09-12 ENCOUNTER — OFFICE VISIT (OUTPATIENT)
Dept: FAMILY MEDICINE CLINIC | Age: 67
End: 2022-09-12
Payer: COMMERCIAL

## 2022-09-12 VITALS
BODY MASS INDEX: 30.87 KG/M2 | HEIGHT: 64 IN | DIASTOLIC BLOOD PRESSURE: 70 MMHG | TEMPERATURE: 98.1 F | WEIGHT: 180.8 LBS | HEART RATE: 83 BPM | SYSTOLIC BLOOD PRESSURE: 116 MMHG

## 2022-09-12 DIAGNOSIS — M48.061 SPINAL STENOSIS OF LUMBAR REGION, UNSPECIFIED WHETHER NEUROGENIC CLAUDICATION PRESENT: ICD-10-CM

## 2022-09-12 PROCEDURE — 1123F ACP DISCUSS/DSCN MKR DOCD: CPT | Performed by: FAMILY MEDICINE

## 2022-09-12 PROCEDURE — 99213 OFFICE O/P EST LOW 20 MIN: CPT | Performed by: FAMILY MEDICINE

## 2022-09-12 PROCEDURE — 99211 OFF/OP EST MAY X REQ PHY/QHP: CPT

## 2022-09-12 RX ORDER — LIDOCAINE 50 MG/G
1 PATCH TOPICAL DAILY
Qty: 30 PATCH | Refills: 5 | Status: SHIPPED | OUTPATIENT
Start: 2022-09-12

## 2022-09-12 ASSESSMENT — PATIENT HEALTH QUESTIONNAIRE - PHQ9
2. FEELING DOWN, DEPRESSED OR HOPELESS: 1
SUM OF ALL RESPONSES TO PHQ QUESTIONS 1-9: 1
SUM OF ALL RESPONSES TO PHQ QUESTIONS 1-9: 1
SUM OF ALL RESPONSES TO PHQ9 QUESTIONS 1 & 2: 1
SUM OF ALL RESPONSES TO PHQ QUESTIONS 1-9: 1
1. LITTLE INTEREST OR PLEASURE IN DOING THINGS: 0
SUM OF ALL RESPONSES TO PHQ QUESTIONS 1-9: 1

## 2022-09-12 NOTE — PATIENT INSTRUCTIONS
Thank you for letting us take care of you today. We hope all your questions were addressed. If a question was overlooked or something else comes to mind after you return home, please contact a member of your Care Team listed below. Your Care Team at [place] [address] is Team #2  Javad Cardozo DO (Faculty)  Jenny Nguyen (Faculty)  Lucy Rincon MD (Resident)  Julius Madison MD (Resident)  Galilea Varela MD (Resident)  Alcon Muñoz MD (Resident)  Juan Daniel Bee., YIMI Turner.,  JAI Mack., JORDANN  Ángela Sierra., [facility] office)  Dipti Self, [address] (Clinical Practice Manager)  [facility] M[facility] (Clinical Pharmacist)     Office phone number: [phone]    If you need to get in right away due to illness, please be advised we have \"Same Day\" appointments available Monday-Friday. Please call us at [phone] option #3 to schedule your \"Same Day\" appointment.

## 2022-09-12 NOTE — PROGRESS NOTES
Visit Information    Have you changed or started any medications since your last visit including any over-the-counter medicines, vitamins, or herbal medicines? no   Have you stopped taking any of your medications? Is so, why? -  no  Are you having any side effects from any of your medications? - no    Have you seen any other physician or provider since your last visit? yes - pain clinic,  ortho Dr. Lennie Escalante  Have you had any other diagnostic tests since your last visit?  no   Have you been seen in the emergency room and/or had an admission in a hospital since we last saw you?  no   Have you had your routine dental cleaning in the past 6 months?  no     Do you have an active MyChart account? If no, what is the barrier?   Yes    Patient Care Team:  Lorrie Severs,  as PCP - [address] [place] [address], DO as PCP - [facility] Provider    Medical History Review  Past Medical, Family, and Social History reviewed and does not contribute to the patient presenting condition    Health Maintenance   Topic Date Due    Hepatitis C screen  Never done    DTaP/Tdap/Td vaccine (1 - Tdap) Never done    Shingles vaccine (1 of 2) Never done    Pneumococcal 65+ years Vaccine (1 - PCV) Never done    COVID-19 Vaccine (4 - Booster for Pedersen Peter series) 04/28/2022    Flu vaccine (1) 09/01/2022    Colorectal Cancer Screen  04/08/2023    A1C test (Diabetic or Prediabetic)  06/08/2023    Depression Screen  06/08/2023    Breast cancer screen  07/07/2023    Lipids  12/17/2026    DEXA (modify frequency per FRAX score)  Completed    Hepatitis A vaccine  Aged Out    Hepatitis B vaccine  Aged Out    Hib vaccine  Aged Out    Meningococcal (ACWY) vaccine  Aged Out
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
## 2022-10-09 ENCOUNTER — APPOINTMENT (OUTPATIENT)
Dept: GENERAL RADIOLOGY | Age: 67
End: 2022-10-09
Payer: COMMERCIAL

## 2022-10-09 ENCOUNTER — HOSPITAL ENCOUNTER (EMERGENCY)
Age: 67
Discharge: HOME | End: 2022-10-09
Attending: EMERGENCY MEDICINE
Payer: COMMERCIAL

## 2022-10-09 VITALS
TEMPERATURE: 99 F | DIASTOLIC BLOOD PRESSURE: 66 MMHG | HEART RATE: 68 BPM | RESPIRATION RATE: 18 BRPM | OXYGEN SATURATION: 97 % | SYSTOLIC BLOOD PRESSURE: 128 MMHG

## 2022-10-09 DIAGNOSIS — S90.31XA CONTUSION OF RIGHT FOOT, INITIAL ENCOUNTER: ICD-10-CM

## 2022-10-09 DIAGNOSIS — S93.401A SPRAIN OF RIGHT ANKLE, UNSPECIFIED LIGAMENT, INITIAL ENCOUNTER: Primary | ICD-10-CM

## 2022-10-09 PROCEDURE — 73610 X-RAY EXAM OF ANKLE: CPT

## 2022-10-09 PROCEDURE — 73630 X-RAY EXAM OF FOOT: CPT

## 2022-10-09 PROCEDURE — 99283 EMERGENCY DEPT VISIT LOW MDM: CPT

## 2022-10-09 ASSESSMENT — ENCOUNTER SYMPTOMS
GASTROINTESTINAL NEGATIVE: 1
RESPIRATORY NEGATIVE: 1
EYES NEGATIVE: 1

## 2022-10-09 ASSESSMENT — PAIN SCALES - GENERAL: PAINLEVEL_OUTOF10: 9

## 2022-10-09 ASSESSMENT — PAIN - FUNCTIONAL ASSESSMENT: PAIN_FUNCTIONAL_ASSESSMENT: 0-10

## 2022-10-09 NOTE — ED PROVIDER NOTES
[id number] [facility] ED  [address] [facility] [address]  Phone: [phone]  Fax: [phone]        Pt Name: Ibrahima Victor  MRN: [id number]  [place] 1955  Date of evaluation: 10/9/22    [address]       Chief Complaint   Patient presents with    Fall    Foot Injury       HISTORY OF PRESENT ILLNESS (Location/Symptom, Timing/Onset, Context/Setting, Quality, Duration, Modifying Factors, Severity)      Ibrahima Victor is a 79 y.o. female with no pertinent PMH who presents to the ED via private auto with complaints of a fall and foot/ankle injury which occurred a couple hours ago. Patient states that she was walking out of a store, when she went to go step off the curb and twisted her ankle landing on her right foot. She states she also has a couple abrasions to both of her knees. She denies any knee pain at this time. She denies any pain in any of her other extremities. She reports muscle pain is in her right ankle and foot. She states she took some ibuprofen and was icing it which did improve her symptoms. She states she did have hit her head or lose consciousness when she fell. She states she is up-to-date on her tetanus immunization. PAST MEDICAL / SURGICAL / SOCIAL / FAMILY HISTORY     PMH:  has a past medical history of Constipation, Diverticulitis, Diverticulosis, Diverticulosis, Fibroids, GERD (gastroesophageal reflux disease), Glaucoma, Hay fever, Hiatal hernia, Irritable bowel disease, Kidney infection, Lumbar stenosis, and MVP (mitral valve prolapse). Surgical History:  has a past surgical history that includes Hysterectomy; Tonsillectomy and adenoidectomy (1973); tumor removal; Ovary surgery (1990's); Colonoscopy; lymph node biopsy (Right, 11/17/2010); Foot surgery (Left); and Total abdominal hysterectomy w/ bilateral salpingoophorectomy (04/02/2009). Social History:  reports that she has never smoked.  She has never used smokeless tobacco. She reports that she does not currently use alcohol. She reports that she does not use drugs. Family History: She indicated that her mother is alive. She indicated that her father is . She indicated that all of her three sisters are alive. She indicated that all of her three brothers are alive. She indicated that the status of her maternal grandmother is unknown. She indicated that the status of her other is unknown.   family history includes Arthritis in her father; Breast Cancer in her sister; Colon Cancer in her maternal grandmother; Diabetes in her mother, sister, sister, and sister; Heart Disease in her father; Hypertension in her mother; Kidney Disease in an other family member; Obesity in her mother; Stroke in an other family member; Tuberculosis in her mother. Psychiatric History: None    Allergies: Latex, Cat hair extract, Codeine, and Eggs or egg-derived products [eggs or egg-derived products]    Home Medications:   Prior to Admission medications    Medication Sig Start Date End Date Taking? Authorizing Provider   diclofenac sodium (VOLTAREN) 1 % GEL Apply 4 g topically 4 times daily as needed for Pain 22   Aurora Woods DO   lidocaine (LIDODERM) 5 % Place 1 patch onto the skin daily 12 hours on, 12 hours off. 22   Aurora Woods DO   busPIRone (BUSPAR) 10 MG tablet Take 1 tablet by mouth in the morning and 1 tablet before bedtime.  22  Aurora Woods DO   gabapentin (NEURONTIN) 300 MG capsule TAKE 1 CAPSULE TWICE DAILY 22  Juan Almazan MD   acetaminophen (TYLENOL) 500 MG tablet Take 2 tablets by mouth 3 times daily 22   Juan Almazan MD   EPINEPHrine (EPIPEN 2-ANSON) 0.3 MG/0.3ML SOAJ injection Inject 0.3 mLs into the muscle once for 1 dose Use as directed for allergic reaction 3/16/21 6/22/22  Juan Almazan MD   albuterol sulfate  (90 Base) MCG/ACT inhaler Inhale 2 puffs into the lungs every 6 hours as needed for Wheezing 3/16/21   Gina BYNUM MD Ashley   mirabegron (MYRBETRIQ) 50 MG TB24 Take 50 mg by mouth daily     Historical Provider, MD   cetirizine (ZYRTEC) 10 MG tablet Take 10 mg by mouth daily. Historical Provider, MD   Multiple Vitamin (MULTI-VITAMIN DAILY PO) Take  by mouth. Historical Provider, MD   Mometasone Furoate (NASONEX NA) by Nasal route. Historical Provider, MD   LUMIGAN 0.01 % SOLN Place 1 drop into both eyes nightly. 12/19/13   Historical Provider, MD       REVIEW OF SYSTEMS  (2-9 systems for level 4, 10 ormore for level 5)      Review of Systems   Constitutional: Negative. HENT: Negative. Eyes: Negative. Respiratory: Negative. Cardiovascular: Negative. Gastrointestinal: Negative. Endocrine: Negative. Genitourinary: Negative. Musculoskeletal:         Right ankle and foot pain   Skin:         Some bruising of the right foot   Neurological: Negative. Hematological: Negative. Psychiatric/Behavioral: Negative. All other systems negative except as marked. PHYSICAL EXAM  (up to 7 for level 4, 8 or more for level 5)      INITIAL VITALS:  oral temperature is 99 °F (37.2 °C). Her blood pressure is 128/66 and her pulse is 68. Her respiration is 18 and oxygen saturation is 97%. Vital signs reviewed. Physical Exam  Constitutional:       Appearance: Normal appearance. HENT:      Head: Normocephalic. Right Ear: Tympanic membrane, ear canal and external ear normal.      Left Ear: Tympanic membrane, ear canal and external ear normal.      Nose: Nose normal.      Mouth/Throat:      Mouth: Mucous membranes are moist.      Pharynx: Oropharynx is clear. Eyes:      Extraocular Movements: Extraocular movements intact. Conjunctiva/sclera: Conjunctivae normal.      Pupils: Pupils are equal, round, and reactive to light. Cardiovascular:      Rate and Rhythm: Normal rate and regular rhythm. Pulses: Normal pulses. Heart sounds: Normal heart sounds.    Pulmonary: Effort: Pulmonary effort is normal.      Breath sounds: Normal breath sounds. Abdominal:      General: Bowel sounds are normal. There is no distension. Palpations: Abdomen is soft. Tenderness: There is no abdominal tenderness. There is no guarding. Musculoskeletal:         General: Swelling and tenderness present. Cervical back: Normal range of motion. No tenderness. Comments: Some tenderness and swelling over the right lateral malleolus as well as over the fifth metatarsal with palpation; minimal bruising noted over the fifth metatarsal   Skin:     General: Skin is warm and dry. Capillary Refill: Capillary refill takes less than 2 seconds. Findings: Bruising present. Comments: Very small abrasion noted to the left knee as well as the right palm; patient applied bacitracin as well as bandage over the sites   Neurological:      Mental Status: She is alert and oriented to person, place, and time. Psychiatric:         Mood and Affect: Mood normal.         Behavior: Behavior normal.         Thought Content: Thought content normal.         Judgment: Judgment normal.         DIFFERENTIAL DIAGNOSIS / MDM     Exam, patient resting her room. She appears nontoxic no distress is noted. Heart sounds are within normal limits upon auscultation. Lung sounds are clear and equal bilaterally. Bowel sounds are present in all 4 quadrants with auscultation. No abdominal distention tenderness or guarding is noted. Pedal pulses are felt strong and equal bilaterally. Upon examination of the right lower extremity, she does have some tenderness and swelling over the right lateral malleolus as well as over the fifth metatarsal with palpation. There is mild bruising noted over the fifth metatarsal as well. No deformity noted. No swelling of her knees. She denies any pain with range of motion.   Very small abrasion noted to the left knee and the palm of the right hand, which she has applied bacitracin and covered with a bandage. Order an x-ray of the right ankle and foot. X-ray of the right ankle showing no acute abnormality x-ray of the right foot showing no acute bony abnormality per radiologist read. I did place patient in an Ace wrap as well as an Aircast.  She declines need for any crutches at this time. Educated her to continue ibuprofen and Tylenol for pain control. Continue to RICE. Follow-up with your primary care physician within the next couple of days. Return to the emergency department with any further injury, significant increase in swelling, any signs of cyanosis, numbness or tingling in the extremity, or any other new concerning or worsening symptoms. Patient states an understanding of education and is stable for outpatient follow-up. PLAN (LABS / IMAGING / EKG):  Orders Placed This Encounter   Procedures    XR ANKLE RIGHT (MIN 3 VIEWS)    XR FOOT RIGHT (MIN 3 VIEWS)    [employer] Ankle Brace, Right       MEDICATIONS ORDERED:  No orders of the defined types were placed in this encounter. Controlled Substances Monitoring:     DIAGNOSTIC RESULTS     EKG: All EKG's are interpreted by the Emergency Department Physician who either signs or Co-signs this chart in the absenceof a cardiologist.      RADIOLOGY: All images are read by the radiologist and their interpretations are reviewed. XR ANKLE RIGHT (MIN 3 VIEWS)   Final Result   No acute abnormality of the ankle. XR FOOT RIGHT (MIN 3 VIEWS)   Final Result   No acute bony abnormalities are noted             No results found. LABS:  No results found for this visit on 10/09/22.     EMERGENCY DEPARTMENT COURSE           Vitals:    Vitals:    10/09/22 1421   BP: 128/66   Pulse: 68   Resp: 18   Temp: 99 °F (37.2 °C)   TempSrc: Oral   SpO2: 97%     -------------------------  BP: 128/66, Temp: 99 °F (37.2 °C), Heart Rate: 68, Resp: 18      RE-EVALUATION:  See ED Course notes above.        CONSULTS:  None    PROCEDURES:  None    FINAL IMPRESSION      1. Sprain of right ankle, unspecified ligament, initial encounter    2. Contusion of right foot, initial encounter          DISPOSITION / PLAN     CONDITION ON DISPOSITION:   Good / Stable for discharge. PATIENT REFERRED TO:  Sena Sanders, [address] [facility] [address]  [phone]    Schedule an appointment as soon as possible for a visit       [facility] ED  [address] [facility]   [address] [place] [address]  [phone]  Go to   If symptoms worsen    DISCHARGE MEDICATIONS:  Discharge Medication List as of 10/9/2022  3:34 PM          ABDI Dodge - NP   Emergency Medicine Nurse Practitioner    (Please note that portions of this note were completed with a voice recognition program.  Efforts were made to edit the dictations but occasionally words aremis-transcribed.)      ABDI Dodge NP  10/09/22 3901

## 2022-10-09 NOTE — ED PROVIDER NOTES
[address] [facility] ED  eMERGENCY dEPARTMENT eNCOUnter   Independent Attestation     Pt Name: Iliana Lagos  MRN: [id number]  [place] 1955  Date of evaluation: 10/9/22       Iliana Lagos is a 79 y.o. female who presents with Fall and Foot Injury        Based on the medical record, the care appears appropriate. I was personally available for consultation in the Emergency Department.     Jaya Sifuentes DO  Attending Emergency  Physician               Jaya Sifuentes DO  10/09/22 5559

## 2022-10-09 NOTE — ED TRIAGE NOTES
Patient to emergency department with complaints of right foot swelling/pain, left knee abrasion and right hand abrasions after tripping falling pta. Pt denies hitting her head.  Pt ambulatory in triage

## 2022-10-09 NOTE — ED NOTES
Ace wrap applied to right ankle, pt educated on proper usage. PNS intact. All questions answered. RN discussed discharge instructions. Patient verbalized understanding and agrees to the plan of care. RN answered all questions. Patient ambulatory & appears to be in no distress.  Pt agrees to follow up with pcp or return to ED if symptoms worsen        Isaias Perez RN  10/09/22 2230

## 2022-10-11 ENCOUNTER — OFFICE VISIT (OUTPATIENT)
Dept: FAMILY MEDICINE CLINIC | Age: 67
End: 2022-10-11
Payer: COMMERCIAL

## 2022-10-11 VITALS
HEART RATE: 72 BPM | BODY MASS INDEX: 30.71 KG/M2 | SYSTOLIC BLOOD PRESSURE: 135 MMHG | WEIGHT: 179 LBS | TEMPERATURE: 97.8 F | DIASTOLIC BLOOD PRESSURE: 79 MMHG

## 2022-10-11 DIAGNOSIS — M25.571 ACUTE RIGHT ANKLE PAIN: Primary | ICD-10-CM

## 2022-10-11 PROCEDURE — 99213 OFFICE O/P EST LOW 20 MIN: CPT

## 2022-10-11 PROCEDURE — 1123F ACP DISCUSS/DSCN MKR DOCD: CPT

## 2022-10-11 RX ORDER — IBUPROFEN 600 MG/1
600 TABLET ORAL 3 TIMES DAILY PRN
Qty: 90 TABLET | Refills: 0 | Status: SHIPPED | OUTPATIENT
Start: 2022-10-11

## 2022-10-11 ASSESSMENT — ENCOUNTER SYMPTOMS
BACK PAIN: 0
SHORTNESS OF BREATH: 0

## 2022-10-11 NOTE — PROGRESS NOTES
Visit Information    Have you changed or started any medications since your last visit including any over-the-counter medicines, vitamins, or herbal medicines? no   Have you stopped taking any of your medications? Is so, why? -  no  Are you having any side effects from any of your medications? - no    Have you seen any other physician or provider since your last visit?  no   Have you had any other diagnostic tests since your last visit? yes - xr on rt ankle   Have you been seen in the emergency room and/or had an admission in a hospital since we last saw you?  yes - [facility] in [place]   Have you had your routine dental cleaning in the past 6 months?  no     Do you have an active MyChart account? If no, what is the barrier?   No:     Patient Care Team:  Kal aH,  as PCP - [address] [place] [address],  [facility]CP [facility]    Medical History Review  Past Medical, Family, and Social History reviewed and does not contribute to the patient presenting condition    Health Maintenance   Topic Date Due    Hepatitis C screen  Never done    DTaP/Tdap/Td vaccine (1 - Tdap) Never done    Shingles vaccine (1 of 2) Never done    Pneumococcal 65+ years Vaccine (1 - PCV) Never done    COVID-19 Vaccine (4 - Booster for Pedersen Peter series) 04/28/2022    Flu vaccine (1) 08/01/2022    Colorectal Cancer Screen  04/08/2023    A1C test (Diabetic or Prediabetic)  06/08/2023    Breast cancer screen  07/07/2023    Depression Screen  09/12/2023    Lipids  12/17/2026    DEXA (modify frequency per FRAX score)  Completed    Hepatitis A vaccine  Aged Out    Hib vaccine  Aged Out    Meningococcal (ACWY) vaccine  Aged Out

## 2022-10-11 NOTE — PROGRESS NOTES
Attending Physician Statement  I have discussed the care of Kaylan Jay, 79 y.o. female,including pertinent history and exam findings,  with the resident Dr. Cydney Lozano MD.  History:  Chief Complaint   Patient presents with    Follow-Up from Hospital     Patient is still having right ankle pain 7/10     I have reviewed the key elements of the encounter with the resident. Examination was done by resident as documented in residents note. BP Readings from Last 3 Encounters:   10/11/22 135/79   10/09/22 128/66   09/12/22 116/70     /79 (Site: Left Upper Arm, Position: Sitting, Cuff Size: Medium Adult)   Pulse 72   Temp 97.8 °F (36.6 °C) (Oral)   Wt 179 lb (81.2 kg)   LMP 04/05/2009   BMI 30.71 kg/m²   Lab Results   Component Value Date    WBC 4.9 06/25/2016    HGB 12.5 06/25/2016    HCT 39.2 06/25/2016     06/25/2016    CHOL 206 (H) 07/16/2019    TRIG 74 07/16/2019    HDL 80 07/16/2019    ALT 19 06/25/2016    AST 17 06/25/2016     01/05/2021    K 4.4 01/05/2021     (H) 01/05/2021    CREATININE 0.74 01/05/2021    BUN 22 01/05/2021    CO2 25 01/05/2021    TSH 1.81 12/04/2015    INR 1.1 06/25/2016    LABA1C 6.0 06/08/2022     Lab Results   Component Value Date    CALCIUM 9.5 01/05/2021    PHOS 3.2 02/04/2014     Lab Results   Component Value Date    LDLCHOLESTEROL 111 07/16/2019     I agree with the assessment, plan and diagnosis of    Diagnosis Orders   1. Acute right ankle pain  ibuprofen (ADVIL;MOTRIN) 600 MG tablet        I agree with  orders as documented by the resident. Recommendations: Agree with resident assessment and plan. Patient likely to benefit from review of Gabapentin with fall risk. Patient also may benefit for DPP as well as Tdap, Shingles, PCV, COVID and Flu vaccines. Return in about 2 weeks (around 10/25/2022) for For Right knee pain .    (Bradford Boom ) Dr. Lluvia Smith MD

## 2022-10-11 NOTE — LETTER
[facility]  [address]  [address] [facility] [address]  Phone: [phone]  Fax: [phone]    Feliciano Mclaughlin MD        October 11, 2022     Patient: Jolynn Alvarenga   YOB: 1955   Date of Visit: 10/11/2022       To Whom It May Concern: It is my medical opinion that Healexis Hernandez needs to be off work from 11th of Oct to 15th of Oct/ 2022    If you have any questions or concerns, please don't hesitate to call.     Sincerely,        Feliciano Mclaughlin MD

## 2022-10-11 NOTE — PROGRESS NOTES
Subjective:    Merlin Czech is a 79 y.o. female with  has a past medical history of Constipation, Diverticulitis, Diverticulosis, Diverticulosis, Fibroids, GERD (gastroesophageal reflux disease), Glaucoma, Hay fever, Hiatal hernia, Irritable bowel disease, Kidney infection, Lumbar stenosis, and MVP (mitral valve prolapse). Presented to the office today for:  Chief Complaint   Patient presents with    Follow-Up from Hospital     Patient is still having right ankle pain 7/10       HPI    Right ankle pain  -Patient fell 2 days ago and hurt her right foot, seen in the ED, has been wrapping the ankle and the foot which has been swollen since then with mild tolerable pain the patient managed with Motrin  -Swelling is getting down, pain is better  -Able to ambulate  -Has a history of 2 previous surgeries on the right foot after a fall  -DEXA scan from 1 year ago is normal  -On multivitamins including vitamin D and calcium  -Compliant with medications      Review of Systems   Respiratory:  Negative for shortness of breath. Cardiovascular:  Negative for chest pain and palpitations. Musculoskeletal:  Positive for joint swelling. Negative for back pain, gait problem and myalgias.         Right foot and ankle pain swelling                 The patient has a   Family History   Problem Relation Age of Onset    Breast Cancer Sister     Diabetes Sister     Diabetes Mother     Hypertension Mother     Tuberculosis Mother     Obesity Mother     Heart Disease Father     Arthritis Father     Diabetes Sister     Diabetes Sister     Colon Cancer Maternal Grandmother     Stroke Other     Kidney Disease Other        Objective:    /79 (Site: Left Upper Arm, Position: Sitting, Cuff Size: Medium Adult)   Pulse 72   Temp 97.8 °F (36.6 °C) (Oral)   Wt 179 lb (81.2 kg)   LMP 04/05/2009   BMI 30.71 kg/m²    BP Readings from Last 3 Encounters:   10/11/22 135/79   10/09/22 128/66   09/12/22 116/70       Physical Exam  Musculoskeletal:      Comments: Bilateral foot exam  Tenderness on palpation of the flexural area of the right foot and ankle  Normal both passive and active range of motion  Normal reflexes  Normal sensation       Lab Results   Component Value Date    WBC 4.9 06/25/2016    HGB 12.5 06/25/2016    HCT 39.2 06/25/2016     06/25/2016    CHOL 206 (H) 07/16/2019    TRIG 74 07/16/2019    HDL 80 07/16/2019    ALT 19 06/25/2016    AST 17 06/25/2016     01/05/2021    K 4.4 01/05/2021     (H) 01/05/2021    CREATININE 0.74 01/05/2021    BUN 22 01/05/2021    CO2 25 01/05/2021    TSH 1.81 12/04/2015    INR 1.1 06/25/2016    LABA1C 6.0 06/08/2022     Lab Results   Component Value Date    CALCIUM 9.5 01/05/2021    PHOS 3.2 02/04/2014     Lab Results   Component Value Date    LDLCHOLESTEROL 111 07/16/2019       Assessment and Plan:    1. Acute right ankle pain and swelling likely secondary to sprains of right ankle  -X-ray of the right ankle and foot was normal done 2 days ago  -Examination is normal apart from swelling of the right ankle. However normal range of motion  -Supportive care, Ace bandage, elevation of the leg  -Motrin for pain as needed  -Ambulate as tolerable    Follow-up in 2 weeks as needed if pain worsen or did not improve        Requested Prescriptions     Signed Prescriptions Disp Refills    ibuprofen (ADVIL;MOTRIN) 600 MG tablet 90 tablet 0     Sig: Take 1 tablet by mouth 3 times daily as needed for Pain       There are no discontinued medications. Jenny Holliday received counseling on the following healthy behaviors: nutrition, exercise and medication adherence    Discussed use,benefit, and side effects of prescribed medications. Barriers to medication compliance addressed. All patient questions answered. Pt voiced understanding. Return in about 2 weeks (around 10/25/2022) for For Right knee pain .         Disclaimer: Some orall of this note was transcribed using voice-recognition software. This may cause typographical errors occasionally. Although all effort is made to fix these errors, please do not hesitate to contact our office if there Otto Mercer concern with the understanding of this note.

## 2022-10-11 NOTE — PATIENT INSTRUCTIONS
Thank you for letting us take care of you today. We hope all your questions were addressed. If a question was overlooked or something else comes to mind after you return home, please contact a member of your Care Team listed below. Your Care Team at [place] [address] is Team #4  Bob Norwood MD (Faculty)  Jessica Beckwith MD (Resident)  Torres West MD (Resident)  Liliana Jacobo MD (Resident)  Jaden Campos MD (Resident)  Harland Epley, LPN Lajoyce H.,RMA Anner Harsh., [facility] office)  Elzatavaresjuly Patel, [address] (Clinical Practice Manager)  Jeanette [facility]angelDavid [facility] (Clinical Pharmacist)       Office phone number: [phone]    If you need to get in right away due to illness, please be advised we have \"Same Day\" appointments available Monday-Friday. Please call us at [phone] option #3 to schedule your \"Same Day\" appointment. Schedule a Vaccine  When you qualify to receive the vaccine, call the [facility]) COVID-19 Vaccination Hotline to schedule your appointment or to get additional information about the [facility]) locations which are offering the COVID-19 vaccine. To be 94% effective, it's important that you receive two doses of one of the COVID-19 vaccines. -If you are receiving the Pedersen Peter vaccine, your second shot will be scheduled as close to 21 days after the first shot as possible. -If you are receiving the Moderna vaccine, your second shot will be scheduled as close to 28 days after the first shot as possible. [facility]) COVID-19 Vaccination Hotline: 171.624.7561    Links to [facility]) website and [facility] website:    [employer]/[facility]-monitoring-[email or web address]-covid-19/covid-19-vaccine/[email or web address]    [email or web address]

## 2022-10-18 ENCOUNTER — OFFICE VISIT (OUTPATIENT)
Dept: FAMILY MEDICINE CLINIC | Age: 67
End: 2022-10-18
Payer: COMMERCIAL

## 2022-10-18 VITALS
HEART RATE: 70 BPM | TEMPERATURE: 98.1 F | SYSTOLIC BLOOD PRESSURE: 134 MMHG | WEIGHT: 177 LBS | BODY MASS INDEX: 30.36 KG/M2 | DIASTOLIC BLOOD PRESSURE: 76 MMHG

## 2022-10-18 DIAGNOSIS — M25.571 ACUTE RIGHT ANKLE PAIN: Primary | ICD-10-CM

## 2022-10-18 PROCEDURE — 1123F ACP DISCUSS/DSCN MKR DOCD: CPT

## 2022-10-18 PROCEDURE — 99211 OFF/OP EST MAY X REQ PHY/QHP: CPT | Performed by: FAMILY MEDICINE

## 2022-10-18 PROCEDURE — 99213 OFFICE O/P EST LOW 20 MIN: CPT

## 2022-10-18 ASSESSMENT — ENCOUNTER SYMPTOMS
CONSTIPATION: 0
COUGH: 0
ABDOMINAL PAIN: 0
BACK PAIN: 0
SHORTNESS OF BREATH: 0

## 2022-10-18 NOTE — PATIENT INSTRUCTIONS
Thank you for letting us take care of you today. We hope all your questions were addressed. If a question was overlooked or something else comes to mind after you return home, please contact a member of your Care Team listed below. Your Care Team at [place] [address] is Team #4  Andressa Siegel MD (Faculty)  Kathryn Falcon MD (Resident)  Asad Ptaino MD (Resident)  Yin Villanueva MD (Resident)  Dilshad Canseco MD (Resident)  FAITH High,DARELL Santana., [facility] office)  [place] (Clinical Practice Manager)  Willis Davis [facility] (Clinical Pharmacist)       Office phone number: [phone]    If you need to get in right away due to illness, please be advised we have \"Same Day\" appointments available Monday-Friday. Please call us at [phone] option #3 to schedule your \"Same Day\" appointment.

## 2022-10-18 NOTE — PROGRESS NOTES
6 [facility] Medicine Residency Program - Outpatient Note      Subjective:    Kari Duarte is a 79 y.o. female with  has a past medical history of Constipation, Diverticulitis, Diverticulosis, Diverticulosis, Fibroids, GERD (gastroesophageal reflux disease), Glaucoma, Hay fever, Hiatal hernia, Irritable bowel disease, Kidney infection, Lumbar stenosis, and MVP (mitral valve prolapse). Presented to the office today for:  Chief Complaint   Patient presents with    Follow-up     Patient is following up on right foot pain       HPI    Patient is a 80 yo female here for right foot pain follow up. Patient developed foot pain after she fell while at [facility] on 10/9 and went to the ER  X rays ankle and foot reviewed which did not show any abnormality  Patient states that she has difficulty putting weight on foot, patient is able to walk, but has to sit down to calm her foot  Patient has put ice on it and takes ibuprofen 600 mg   And takes voltaren gel which helped with the symptoms and swelling    Review of Systems   Constitutional:  Negative for activity change and appetite change. Respiratory:  Negative for cough and shortness of breath. Cardiovascular:  Negative for chest pain and palpitations. Gastrointestinal:  Negative for abdominal pain and constipation. Genitourinary:  Negative for flank pain and frequency. Musculoskeletal:  Negative for arthralgias and back pain. Neurological:  Negative for tremors and weakness. Hematological:  Negative for adenopathy. Psychiatric/Behavioral:  Negative for agitation.                 The patient has a   Family History   Problem Relation Age of Onset    Breast Cancer Sister     Diabetes Sister     Diabetes Mother     Hypertension Mother     Tuberculosis Mother     Obesity Mother     Heart Disease Father     Arthritis Father     Diabetes Sister     Diabetes Sister     Colon Cancer Maternal Grandmother     Stroke Other     Kidney Disease Other        Objective:    /76 (Site: Left Upper Arm, Position: Sitting, Cuff Size: Medium Adult)   Pulse 70   Temp 98.1 °F (36.7 °C) (Oral)   Wt 177 lb (80.3 kg)   LMP 04/05/2009   BMI 30.36 kg/m²    BP Readings from Last 3 Encounters:   10/18/22 134/76   10/11/22 135/79   10/09/22 128/66       Physical Exam  Cardiovascular:      Rate and Rhythm: Normal rate and regular rhythm. Pulses: Normal pulses. Heart sounds: Normal heart sounds. Pulmonary:      Effort: Pulmonary effort is normal.      Breath sounds: Normal breath sounds. Abdominal:      General: There is no distension. Palpations: Abdomen is soft. Musculoskeletal:         General: Swelling and tenderness (Right ankle) present. No deformity or signs of injury. Neurological:      General: No focal deficit present. Mental Status: She is alert and oriented to person, place, and time. Psychiatric:         Mood and Affect: Mood normal.         Behavior: Behavior normal.       Lab Results   Component Value Date    WBC 4.9 06/25/2016    HGB 12.5 06/25/2016    HCT 39.2 06/25/2016     06/25/2016    CHOL 206 (H) 07/16/2019    TRIG 74 07/16/2019    HDL 80 07/16/2019    ALT 19 06/25/2016    AST 17 06/25/2016     01/05/2021    K 4.4 01/05/2021     (H) 01/05/2021    CREATININE 0.74 01/05/2021    BUN 22 01/05/2021    CO2 25 01/05/2021    TSH 1.81 12/04/2015    INR 1.1 06/25/2016    LABA1C 6.0 06/08/2022     Lab Results   Component Value Date    CALCIUM 9.5 01/05/2021    PHOS 3.2 02/04/2014     Lab Results   Component Value Date    LDLCHOLESTEROL 111 07/16/2019       Assessment and Plan:    1. Acute right ankle pain  -Counseled the patient to wrap the ankle  -Continue taking Motrin and Voltaren gel  -Patient also given exercises she can do at home  -Apply ice packs        Requested Prescriptions      No prescriptions requested or ordered in this encounter       There are no discontinued medications.     Jeanette Galdamez received counseling on the following healthy behaviors: nutrition, exercise and medication adherence    Discussed use,benefit, and side effects of prescribed medications. Barriers to medication compliance addressed. All patient questions answered. Pt voiced understanding. Return in about 4 weeks (around 11/15/2022), or if symptoms worsen or fail to improve. Disclaimer: Some orall of this note was transcribed using voice-recognition software. This may cause typographical errors occasionally. Although all effort is made to fix these errors, please do not hesitate to contact our office if there Jonas Red concern with the understanding of this note.

## 2022-10-18 NOTE — LETTER
[facility]   [address]  [address] [place] [address]  Phone: [phone]  Fax: [phone]    Mariela Rodrigues MD        October 18, 2022     Patient: Riaz De Souza   YOB: 1955   Date of Visit: 10/18/2022       To Whom It May Concern: It is my medical opinion that Pam Middleton may return to work on 10/24. If you have any questions or concerns, please don't hesitate to call.     Sincerely,        Mariela Rodrigues MD

## 2022-10-18 NOTE — PROGRESS NOTES
Visit Information    Have you changed or started any medications since your last visit including any over-the-counter medicines, vitamins, or herbal medicines? no   Have you stopped taking any of your medications? Is so, why? -  no  Are you having any side effects from any of your medications? - no    Have you seen any other physician or provider since your last visit?  no   Have you had any other diagnostic tests since your last visit?  no   Have you been seen in the emergency room and/or had an admission in a hospital since we last saw you?  no   Have you had your routine dental cleaning in the past 6 months?  no     Do you have an active MyChart account? If no, what is the barrier?   No:     Patient Care Team:  Sergio Guzman,  as PCP - [address] [place] [address],  as PCP - [facility] Provider    Medical History Review  Past Medical, Family, and Social History reviewed and does not contribute to the patient presenting condition    Health Maintenance   Topic Date Due    Hepatitis C screen  Never done    DTaP/Tdap/Td vaccine (1 - Tdap) Never done    Shingles vaccine (1 of 2) Never done    Pneumococcal 65+ years Vaccine (1 - PCV) Never done    COVID-19 Vaccine (4 - Booster for Pedersen Peter series) 04/28/2022    Flu vaccine (1) 08/01/2022    Colorectal Cancer Screen  04/08/2023    A1C test (Diabetic or Prediabetic)  06/08/2023    Breast cancer screen  07/07/2023    Depression Screen  09/12/2023    Lipids  12/17/2026    DEXA (modify frequency per FRAX score)  Completed    Hepatitis A vaccine  Aged Out    Hib vaccine  Aged Out    Meningococcal (ACWY) vaccine  Aged Out

## 2022-10-19 ENCOUNTER — HOSPITAL ENCOUNTER (EMERGENCY)
Age: 67
Discharge: HOME | End: 2022-10-19
Attending: EMERGENCY MEDICINE
Payer: COMMERCIAL

## 2022-10-19 VITALS
HEART RATE: 64 BPM | BODY MASS INDEX: 30.22 KG/M2 | WEIGHT: 177 LBS | RESPIRATION RATE: 16 BRPM | HEIGHT: 64 IN | TEMPERATURE: 98.4 F | DIASTOLIC BLOOD PRESSURE: 67 MMHG | OXYGEN SATURATION: 100 % | SYSTOLIC BLOOD PRESSURE: 133 MMHG

## 2022-10-19 DIAGNOSIS — T78.40XA ALLERGIC REACTION, INITIAL ENCOUNTER: Primary | ICD-10-CM

## 2022-10-19 PROCEDURE — 99284 EMERGENCY DEPT VISIT MOD MDM: CPT

## 2022-10-19 PROCEDURE — 96374 THER/PROPH/DIAG INJ IV PUSH: CPT

## 2022-10-19 PROCEDURE — 2500000003 HC RX 250 WO HCPCS: Performed by: EMERGENCY MEDICINE

## 2022-10-19 PROCEDURE — 6360000002 HC RX W HCPCS: Performed by: EMERGENCY MEDICINE

## 2022-10-19 PROCEDURE — 96375 TX/PRO/DX INJ NEW DRUG ADDON: CPT

## 2022-10-19 PROCEDURE — A4216 STERILE WATER/SALINE, 10 ML: HCPCS | Performed by: EMERGENCY MEDICINE

## 2022-10-19 PROCEDURE — 2580000003 HC RX 258: Performed by: EMERGENCY MEDICINE

## 2022-10-19 RX ORDER — PREDNISONE 10 MG/1
TABLET ORAL
Qty: 20 TABLET | Refills: 0 | Status: SHIPPED | OUTPATIENT
Start: 2022-10-19 | End: 2022-10-29

## 2022-10-19 RX ORDER — SODIUM CHLORIDE 9 MG/ML
INJECTION, SOLUTION INTRAVENOUS CONTINUOUS
Status: DISCONTINUED | OUTPATIENT
Start: 2022-10-19 | End: 2022-10-19 | Stop reason: HOSPADM

## 2022-10-19 RX ORDER — DIPHENHYDRAMINE HYDROCHLORIDE 50 MG/ML
50 INJECTION INTRAMUSCULAR; INTRAVENOUS ONCE
Status: COMPLETED | OUTPATIENT
Start: 2022-10-19 | End: 2022-10-19

## 2022-10-19 RX ORDER — METHYLPREDNISOLONE SODIUM SUCCINATE 125 MG/2ML
125 INJECTION, POWDER, LYOPHILIZED, FOR SOLUTION INTRAMUSCULAR; INTRAVENOUS ONCE
Status: COMPLETED | OUTPATIENT
Start: 2022-10-19 | End: 2022-10-19

## 2022-10-19 RX ORDER — EPINEPHRINE 0.3 MG/.3ML
0.3 INJECTION SUBCUTANEOUS ONCE
Qty: 0.3 ML | Refills: 0 | Status: SHIPPED | OUTPATIENT
Start: 2022-10-19 | End: 2022-10-19

## 2022-10-19 RX ADMIN — METHYLPREDNISOLONE SODIUM SUCCINATE 125 MG: 125 INJECTION, POWDER, FOR SOLUTION INTRAMUSCULAR; INTRAVENOUS at 18:32

## 2022-10-19 RX ADMIN — SODIUM CHLORIDE: 9 INJECTION, SOLUTION INTRAVENOUS at 18:25

## 2022-10-19 RX ADMIN — DIPHENHYDRAMINE HYDROCHLORIDE 50 MG: 50 INJECTION, SOLUTION INTRAMUSCULAR; INTRAVENOUS at 18:26

## 2022-10-19 RX ADMIN — FAMOTIDINE 20 MG: 10 INJECTION, SOLUTION INTRAVENOUS at 18:28

## 2022-10-19 ASSESSMENT — PAIN - FUNCTIONAL ASSESSMENT: PAIN_FUNCTIONAL_ASSESSMENT: NONE - DENIES PAIN

## 2022-10-19 NOTE — ED NOTES
Patient to the ER with c/c of allergic reaction to possible cats. Patient was outside cleaning up leaves and had her throat begin to itch and feel swollen. 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 Patient is a/o x 3, patent airway, speaking clearly in complete sentences. Tongue appears normal in size, no swelling of oropharynx. Patient denies any CP or dyspnea. Lungs are clear and equal bilaterally to auscultation, HT are S/R, A=R. Abdomen is soft, non-tender. Noted some hives to her back. No NVD in last 24 hours. Normal Bowel and Bladder habits. Patient has strong PMS x 4. No peripheral edema is appreciated. Patient was ambulatory into the ER today without distress.       Alton Rjoas RN  10/19/22 [id number]

## 2022-10-19 NOTE — DISCHARGE INSTRUCTIONS
PLEASE RETURN TO THE EMERGENCY DEPARTMENT IMMEDIATELY if your symptoms worsen in anyway or in 1-2 days if not improved for re-evaluation. You should immediately return to the ER for symptoms such as new or worsening pain, difficulty breathing or swallowing, a change in your voice, a feeling of passing out, light headed, dizziness, chest pain, headache, neck pain, rash, abdominal pain or vomiting. Take your medication as indicated and prescribed. If you are given an antibiotic then, make sure you get the prescription filled and take the antibiotics until finished. Please understand that at this time there is no evidence for a more serious underlying process, but that early in the process of an illness or injury, an emergency department workup can be falsely reassuring. You should contact your family doctor within the next 48 hours for a follow up appointment. Soha Gaytan!!!    From [facility] Emergency Department ([facility]) and [facility] Emergency Services    On behalf of the Emergency Department staff at [facility]), I would like to thank you for giving us the opportunity to address your health care needs and concerns. We hope that during your visit, our service was delivered in a professional and caring manner. Please keep [facility]) in mind as we walk with you down the path to your own personal wellness. Please expect an automated text message or email from us so we can ask a few questions about your health and progress. Based on your answers, a clinician may call you back to offer help and instructions. Please understand that early in the process of an illness or injury, an emergency department workup can be falsely reassuring. If you notice any worsening, changing or persistent symptoms please call your family doctor or return to the ER immediately. Tell us how we did during your visit at [email or web address]   and let us know about your experience

## 2022-10-19 NOTE — ED NOTES
Pt reports symptoms much improved, denies needs, no complaints.       Eldon Herrera, RN  10/19/22 0592

## 2022-10-19 NOTE — ED PROVIDER NOTES
[id number] [facility]  [address] [facility] [address]  Phone: [phone]  Fax: Kat Saini [phone]      Pt Name: Riaz De Souza  MRN: [id number]  [place] 1955  Date of evaluation: 10/19/2022    CHIEF COMPLAINT       Chief Complaint   Patient presents with    Allergic Reaction     Was outside doing yard work about an hour ago        Melissa Porter is a 79 y.o. female who presents to the emergency department with allergic reaction. Was outside in the yard about an hour prior to arrival when she developed facial swelling and diffuse welts. She did not have any Benadryl tried to run to the store to get some but they only had children's. History of allergic reactions and has an EpiPen but it was  so she did not use it. She denies a difficulty breathing or difficulty swallowing. Denies any other complaints. No chest pain or shortness of breath. No fevers or chills no recent illnesses. She is not sure what triggered this. REVIEW OF SYSTEMS       Constitutional: No fevers or chills   HEENT: No sore throat, rhinorrhea, or earache positive lip swelling  Eyes: No blurry vision or double vision no drainage   Cardiovascular: No chest pain or tachycardia   Respiratory: No wheezing or shortness of breath no cough   Gastrointestinal: No nausea, vomiting, diarrhea, constipation, or abdominal pain   : No hematuria or dysuria   Musculoskeletal: No swelling or pain   Skin: Diffuse welts  Neurological: No focal neurologic complaints, paresthesias, weakness, or headache     PAST MEDICAL HISTORY    has a past medical history of Constipation, Diverticulitis, Diverticulosis, Diverticulosis, Fibroids, GERD (gastroesophageal reflux disease), Glaucoma, Hay fever, Hiatal hernia, Irritable bowel disease, Kidney infection, Lumbar stenosis, and MVP (mitral valve prolapse).     SURGICAL HISTORY      has a past surgical history that includes Hysterectomy; Tonsillectomy and adenoidectomy (); tumor removal; Ovary surgery (); Colonoscopy; lymph node biopsy (Right, 2010); Foot surgery (Left); and Total abdominal hysterectomy w/ bilateral salpingoophorectomy (2009). CURRENT MEDICATIONS       Previous Medications    ACETAMINOPHEN (TYLENOL) 500 MG TABLET    Take 2 tablets by mouth 3 times daily    ALBUTEROL SULFATE  (90 BASE) MCG/ACT INHALER    Inhale 2 puffs into the lungs every 6 hours as needed for Wheezing    BUSPIRONE (BUSPAR) 10 MG TABLET    Take 1 tablet by mouth in the morning and 1 tablet before bedtime. CETIRIZINE (ZYRTEC) 10 MG TABLET    Take 10 mg by mouth daily. DICLOFENAC SODIUM (VOLTAREN) 1 % GEL    Apply 4 g topically 4 times daily as needed for Pain    EPINEPHRINE (EPIPEN 2-ANSON) 0.3 MG/0.3ML SOAJ INJECTION    Inject 0.3 mLs into the muscle once for 1 dose Use as directed for allergic reaction    GABAPENTIN (NEURONTIN) 300 MG CAPSULE    TAKE 1 CAPSULE TWICE DAILY    IBUPROFEN (ADVIL;MOTRIN) 600 MG TABLET    Take 1 tablet by mouth 3 times daily as needed for Pain    LIDOCAINE (LIDODERM) 5 %    Place 1 patch onto the skin daily 12 hours on, 12 hours off. LUMIGAN 0.01 % SOLN    Place 1 drop into both eyes nightly. MIRABEGRON (MYRBETRIQ) 50 MG TB24    Take 50 mg by mouth daily     MOMETASONE FUROATE (NASONEX NA)    by Nasal route. MULTIPLE VITAMIN (MULTI-VITAMIN DAILY PO)    Take  by mouth. ALLERGIES     is allergic to latex, cat hair extract, codeine, and eggs or egg-derived products [eggs or egg-derived products]. FAMILY HISTORY     She indicated that her mother is alive. She indicated that her father is . She indicated that all of her three sisters are alive. She indicated that all of her three brothers are alive. She indicated that the status of her maternal grandmother is unknown.  She indicated that the status of her other is unknown.     family history includes Arthritis in her father; Breast Cancer in her sister; Colon Cancer in her maternal grandmother; Diabetes in her mother, sister, sister, and sister; Heart Disease in her father; Hypertension in her mother; Kidney Disease in an other family member; Obesity in her mother; Stroke in an other family member; Tuberculosis in her mother. SOCIAL HISTORY      reports that she has never smoked. She has never used smokeless tobacco. She reports that she does not currently use alcohol. She reports that she does not use drugs. PHYSICAL EXAM       ED Triage Vitals [10/19/22 1812]   BP Temp Temp Source Pulse Resp SpO2 Height Weight   (!) 166/68 98.4 °F (36.9 °C) Oral -- -- -- 5' 4\" (1.626 m) 177 lb (80.3 kg)       Constitutional: Alert, oriented x3, nontoxic, answering questions appropriately, acting properly for age, in no acute distress   HEENT: Extraocular muscles intact, mucus membranes moist, TMs clear bilaterally, no posterior pharyngeal erythema or exudates, Pupils equal, round, reactive to light, positive lip swelling no uvular edema or tongue swelling multiple small welts on the face  Neck: Trachea midline no stridor. No meningismus. Cardiovascular: Regular rhythm and rate no murmurs   Respiratory: Clear to auscultation bilaterally no wheezes, rhonchi, rales, no respiratory distress no tachypnea no retractions no hypoxia  Gastrointestinal: Soft, nontender, nondistended, positive bowel sounds. No rebound, rigidity, or guarding. Musculoskeletal: No extremity pain or swelling   Neurologic: Moving all 4 extremities without difficulty there are no gross focal neurologic deficits   Skin: Warm and dry small welts on the chest abdomen and back      DIFFERENTIAL DIAGNOSIS/ MDM:     No acute distress. Lip swelling no uvular edema or tongue swelling.   IV steroids Benadryl and Pepcid    DIAGNOSTIC RESULTS     EKG: All EKG's are interpreted by the Emergency Department Physician who either signs or Co-signs this chart in the absence of a cardiologist.        Not indicated unless otherwise documented above    LABS:  No results found for this visit on 10/19/22. Not indicated unless otherwise documented above    RADIOLOGY:   I reviewed the radiologist interpretations:    No orders to display       Not indicated unless otherwise documented above    EMERGENCY DEPARTMENT COURSE:     The patient was given the following medications:  Orders Placed This Encounter   Medications    0.9 % sodium chloride infusion    methylPREDNISolone sodium (SOLU-MEDROL) injection 125 mg    diphenhydrAMINE (BENADRYL) injection 50 mg    famotidine (PEPCID) 20 mg in sodium chloride (PF) 10 mL injection        Vitals:   -------------------------  BP (!) 166/68   Temp 98.4 °F (36.9 °C) (Oral)   Ht 5' 4\" (1.626 m)   Wt 80.3 kg (177 lb)   LMP 04/05/2009   BMI 30.38 kg/m²     6:35 PM patient resting comfortably no acute distress no tongue swelling no uvular edema.   At 7 PM care will be transferred to Dr. Fatimah Francisco      (Please note that portions of this note were completed with a voice recognition program.  Efforts were made to edit the dictations but occasionally words are mis-transcribed.)    Nadege Fierro DO   Attending Emergency Physician     Nadege Fierro [place]  10/19/22 1832

## 2022-10-20 NOTE — ED PROVIDER NOTES
FACULTY SIGN-OUT  ADDENDUM     Care of this patient was assumed from Dr. Garett Ricks. The patient was seen for Allergic Reaction (Was outside doing yard work about an hour ago )  . The patient's initial evaluation and plan have been discussed with the prior provider who initially evaluated the patient. Nursing Notes, Past Medical Hx, Past Surgical Hx, Social Hx, Allergies, and Family Hx were all reviewed. CHIEF COMPLAINT       Chief Complaint   Patient presents with    Allergic Reaction     Was outside doing yard work about an hour ago          [address] [employer]    has a past medical history of Constipation, Diverticulitis, Diverticulosis, Diverticulosis, Fibroids, GERD (gastroesophageal reflux disease), Glaucoma, Hay fever, Hiatal hernia, Irritable bowel disease, Kidney infection, Lumbar stenosis, and MVP (mitral valve prolapse). SURGICAL HISTORY      has a past surgical history that includes Hysterectomy; Tonsillectomy and adenoidectomy (1973); tumor removal; Ovary surgery (1990's); Colonoscopy; lymph node biopsy (Right, 11/17/2010); Foot surgery (Left); and Total abdominal hysterectomy w/ bilateral salpingoophorectomy (04/02/2009). CURRENT MEDICATIONS       Discharge Medication List as of 10/19/2022  7:46 PM        CONTINUE these medications which have NOT CHANGED    Details   ibuprofen (ADVIL;MOTRIN) 600 MG tablet Take 1 tablet by mouth 3 times daily as needed for Pain, Disp-90 tablet, R-0Normal      diclofenac sodium (VOLTAREN) 1 % GEL Apply 4 g topically 4 times daily as needed for Pain, Topical, 4 TIMES DAILY PRN Starting Mon 9/12/2022, Disp-150 g, R-3, Normal      lidocaine (LIDODERM) 5 % Place 1 patch onto the skin daily 12 hours on, 12 hours off., Disp-30 patch, R-5Normal      busPIRone (BUSPAR) 10 MG tablet Take 1 tablet by mouth in the morning and 1 tablet before bedtime. , Disp-60 tablet, R-3Normal      gabapentin (NEURONTIN) 300 MG capsule TAKE 1 CAPSULE TWICE DAILY, Disp-90 capsule, R-1Normal      acetaminophen (TYLENOL) 500 MG tablet Take 2 tablets by mouth 3 times daily, Disp-180 tablet, R-1Normal      albuterol sulfate  (90 Base) MCG/ACT inhaler Inhale 2 puffs into the lungs every 6 hours as needed for Wheezing, Disp-1 Inhaler, R-1Normal      mirabegron (MYRBETRIQ) 50 MG TB24 Take 50 mg by mouth daily Historical Med      cetirizine (ZYRTEC) 10 MG tablet Take 10 mg by mouth daily. Multiple Vitamin (MULTI-VITAMIN DAILY PO) Take  by mouth. Mometasone Furoate (NASONEX NA) by Nasal route. LUMIGAN 0.01 % SOLN Place 1 drop into both eyes nightly. ALLERGIES     is allergic to latex, cat hair extract, codeine, and eggs or egg-derived products [eggs or egg-derived products]. FAMILY HISTORY     She indicated that her mother is alive. She indicated that her father is . She indicated that all of her three sisters are alive. She indicated that all of her three brothers are alive. She indicated that the status of her maternal grandmother is unknown. She indicated that the status of her other is unknown.     family history includes Arthritis in her father; Breast Cancer in her sister; Colon Cancer in her maternal grandmother; Diabetes in her mother, sister, sister, and sister; Heart Disease in her father; Hypertension in her mother; Kidney Disease in an other family member; Obesity in her mother; Stroke in an other family member; Tuberculosis in her mother. SOCIAL HISTORY      reports that she has never smoked. She has never used smokeless tobacco. She reports that she does not currently use alcohol. She reports that she does not use drugs.       DIAGNOSTIC RESULTS     EKG: All EKG's are interpreted by the Emergency Department Physician who either signs or Co-signs this chart in the absence of a cardiologist.        RADIOLOGY:   Non-plain film images such as CT, Ultrasound and MRI are read by the radiologist. Plain radiographic images are visualized and the radiologist interpretations are reviewed as follows: No orders to display       XR ANKLE RIGHT (MIN 3 VIEWS)    Result Date: 10/9/2022  EXAMINATION: THREE XRAY VIEWS OF THE RIGHT ANKLE 10/9/2022 2:41 pm COMPARISON: None. HISTORY: ORDERING SYSTEM PROVIDED HISTORY: injury TECHNOLOGIST PROVIDED HISTORY: injury Reason for Exam: C/o lateral foot/ankle pain after tripping down a step today at [employer]. Hx of surgery to lateral ankle. Swelling noted. FINDINGS: No evidence of acute fracture or dislocation. Normal alignment of the ankle mortise. No focal osseous lesion. No evidence of joint effusion. No focal soft tissue abnormality. No acute abnormality of the ankle. XR FOOT RIGHT (MIN 3 VIEWS)    Result Date: 10/9/2022  EXAMINATION: THREE XRAY VIEWS OF THE RIGHT FOOT 10/9/2022 11:41 am COMPARISON: None. HISTORY: ORDERING SYSTEM PROVIDED HISTORY: injury TECHNOLOGIST PROVIDED HISTORY: injury Reason for Exam: C/o lateral foot/ankle pain after tripping down a step today at [employer]. Hx of surgery to lateral ankle. Swelling noted. FINDINGS: The visualized bones are mildly osteopenic. There is no evidence of fracture or dislocation. Hammertoe deformities. The remaining joint spaces appear well maintained. The soft tissues are unremarkable. Calcaneal spurs     No acute bony abnormalities are noted         LABS:  No results found for this visit on 10/19/22.       EMERGENCY DEPARTMENT COURSE:   Recent Vitals:    Vitals:    10/19/22 1812 10/19/22 1903 10/19/22 2000   BP: (!) 166/68 133/68 133/67   Pulse:  64    Resp:  16    Temp: 98.4 °F (36.9 °C)     TempSrc: Oral     SpO2:  100% 100%   Weight: 80.3 kg (177 lb)     Height: 5' 4\" (1.626 m)       -------------------------  BP: 133/67, Temp: 98.4 °F (36.9 °C), Heart Rate: 64, Resp: 16      The patient was given the following medications:  Orders Placed This Encounter   Medications    DISCONTD: 0.9 % sodium chloride infusion    methylPREDNISolone sodium (SOLU-MEDROL) injection 125 mg    diphenhydrAMINE (BENADRYL) injection 50 mg    famotidine (PEPCID) 20 mg in sodium chloride (PF) 10 mL injection    EPINEPHrine (EPIPEN 2-ANSON) 0.3 MG/0.3ML SOAJ injection     Sig: Inject 0.3 mLs into the muscle once for 1 dose Use as directed for allergic reaction     Dispense:  0.3 mL     Refill:  0    predniSONE (DELTASONE) 10 MG tablet     Sig: Take 4 tablets by mouth once daily for 5 days     Dispense:  20 tablet     Refill:  0           MEDICAL DECISION MAKING:     Patient presents with an allergic reaction. Does have numerous allergies. It is a little unclear exactly what caused this exacerbation however she is feeling better on my evaluation. Plan to be to keep the patient on steroids. She knows to take Benadryl and Pepcid. We will also prescribe an EpiPen. She did not need epinephrine for this visit. She has no difficulty breathing or swallowing at this time. Clinically I feel she is appropriate for discharge and outpatient follow-up and the patient does feel comfortable being discharged home. I did offer her further observation in our department but she states she would prefer to be discharged home at this time as she is feeling better. She knows she can return at any time if worsening or for new or concerning symptoms and to follow-up with her PCP. She is comfortable with this plan. The patient understands that at this time there is no evidence for a more malignant underlying process, but the patient also understands that early in the process of an illness or injury, an emergency department workup can be falsely reassuring. Routine discharge counseling was given, and the patient understands that worsening, changing or persistent symptoms should prompt an immediate call or follow up with their primary physician or return to the emergency department. The importance of appropriate follow up was also discussed.   I have reviewed the disposition diagnosis with the patient and or their family/guardian. I have answered their questions and given discharge instructions. They voiced understanding of these instructions and did not have any further questions or complaints. CONSULTS:    None    CRITICAL CARE:     None    PROCEDURES:    None    FINAL IMPRESSION      1. Allergic reaction, initial encounter          DISPOSITION/PLAN   DISPOSITION Decision To Discharge 10/19/2022 07:44:56 PM      Condition on Disposition    Improved    PATIENT REFERRED TO:  Johnson Jamison, [address] [place] [address] [address] [phone]    Schedule an appointment as soon as possible for a visit in 2 days      DISCHARGE MEDICATIONS:  Discharge Medication List as of 10/19/2022  7:46 PM        START taking these medications    Details   predniSONE (DELTASONE) 10 MG tablet Take 4 tablets by mouth once daily for 5 days, Disp-20 tablet, R-0Normal             (Please note that portions of this note were completed with a voice recognition program.  Efforts were made to edit the dictations but occasionally words are mis-transcribed.)        [place] ISAURO De Oliveira.   Emergency Medicine Attending       Tellis Alpers, DO  10/20/22 3007

## 2022-10-21 ENCOUNTER — TELEPHONE (OUTPATIENT)
Dept: FAMILY MEDICINE CLINIC | Age: 67
End: 2022-10-21

## 2022-10-21 NOTE — TELEPHONE ENCOUNTER
Patient contact office to see if received faxed for fmla, Writer informed fmla paperwork not received.

## 2022-10-24 NOTE — TELEPHONE ENCOUNTER
Patient contacted office again regarding FMLA paperwork. Writer informed her that forms were not received as of yet. Writer provided patient with the fax number of [phone] to resend forms.

## 2022-10-25 ENCOUNTER — OFFICE VISIT (OUTPATIENT)
Dept: FAMILY MEDICINE CLINIC | Age: 67
End: 2022-10-25
Payer: COMMERCIAL

## 2022-10-25 VITALS
OXYGEN SATURATION: 100 % | HEIGHT: 64 IN | TEMPERATURE: 98 F | HEART RATE: 73 BPM | SYSTOLIC BLOOD PRESSURE: 117 MMHG | BODY MASS INDEX: 30.25 KG/M2 | DIASTOLIC BLOOD PRESSURE: 62 MMHG | WEIGHT: 177.2 LBS

## 2022-10-25 DIAGNOSIS — R22.41 LOCALIZED SWELLING OF RIGHT FOOT: ICD-10-CM

## 2022-10-25 DIAGNOSIS — M79.671 RIGHT FOOT PAIN: Primary | ICD-10-CM

## 2022-10-25 DIAGNOSIS — R73.03 PREDIABETES: ICD-10-CM

## 2022-10-25 LAB — HBA1C MFR BLD: 5.7 %

## 2022-10-25 PROCEDURE — 83036 HEMOGLOBIN GLYCOSYLATED A1C: CPT

## 2022-10-25 PROCEDURE — 99211 OFF/OP EST MAY X REQ PHY/QHP: CPT | Performed by: STUDENT IN AN ORGANIZED HEALTH CARE EDUCATION/TRAINING PROGRAM

## 2022-10-25 PROCEDURE — 1123F ACP DISCUSS/DSCN MKR DOCD: CPT

## 2022-10-25 PROCEDURE — 99213 OFFICE O/P EST LOW 20 MIN: CPT

## 2022-10-25 ASSESSMENT — PATIENT HEALTH QUESTIONNAIRE - PHQ9
SUM OF ALL RESPONSES TO PHQ QUESTIONS 1-9: 0
SUM OF ALL RESPONSES TO PHQ9 QUESTIONS 1 & 2: 0
SUM OF ALL RESPONSES TO PHQ QUESTIONS 1-9: 0
SUM OF ALL RESPONSES TO PHQ QUESTIONS 1-9: 0
2. FEELING DOWN, DEPRESSED OR HOPELESS: 0
SUM OF ALL RESPONSES TO PHQ QUESTIONS 1-9: 0
1. LITTLE INTEREST OR PLEASURE IN DOING THINGS: 0

## 2022-10-25 ASSESSMENT — ENCOUNTER SYMPTOMS: BACK PAIN: 0

## 2022-10-25 NOTE — PROGRESS NOTES
Visit Information    Have you changed or started any medications since your last visit including any over-the-counter medicines, vitamins, or herbal medicines? no   Have you stopped taking any of your medications? Is so, why? -  no  Are you having any side effects from any of your medications? - no    Have you seen any other physician or provider since your last visit?  no   Have you had any other diagnostic tests since your last visit?  no   Have you been seen in the emergency room and/or had an admission in a hospital since we last saw you?  no   Have you had your routine dental cleaning in the past 6 months?  no     Do you have an active MyChart account? If no, what is the barrier?   Yes    Patient Care Team:  Keyla Charles, DO as PCP - [address], DO as PCP - [address] Provider    Medical History Review  Past Medical, Family, and Social History reviewed and does not contribute to the patient presenting condition    Health Maintenance   Topic Date Due    Hepatitis C screen  Never done    DTaP/Tdap/Td vaccine (1 - Tdap) Never done    Shingles vaccine (1 of 2) Never done    Pneumococcal 65+ years Vaccine (1 - PCV) Never done    COVID-19 Vaccine (4 - Booster for Pedersen Peter series) 02/22/2022    Flu vaccine (1) 08/01/2022    Colorectal Cancer Screen  04/08/2023    A1C test (Diabetic or Prediabetic)  06/08/2023    Breast cancer screen  07/07/2023    Depression Screen  09/12/2023    Lipids  12/17/2026    DEXA (modify frequency per FRAX score)  Completed    Hepatitis A vaccine  Aged Out    Hib vaccine  Aged Out    Meningococcal (ACWY) vaccine  Aged Out

## 2022-10-25 NOTE — PATIENT INSTRUCTIONS
Thank you for letting us take care of you today. We hope all your questions were addressed. If a question was overlooked or something else comes to mind after you return home, please contact a member of your Care Team listed below. Your Care Team at [place] [address] is Team #4  Florencio Everett MD (Faculty)  Elena Matias MD (Resident)  Sonia Mulligan MD (Resident)  Heather Lizama MD (Resident)  Zakia Pinto MD (Resident)  DARELL Hickey., FAITH Jimenez, Christin Mari., [facility] office)  Charlette Mora, [place] (Clinical Practice Manager)  Kiera Quinones [facility] (Clinical Pharmacist)       Office phone number: [phone]    If you need to get in right away due to illness, please be advised we have \"Same Day\" appointments available Monday-Friday. Please call us at [phone] option #3 to schedule your \"Same Day\" appointment.

## 2022-10-25 NOTE — LETTER
[facility]   [address]  [address] [place] [address]  Phone: [phone]  Fax: [phone]    Yvan Harvey MD        October 25, 2022     Patient: Erick Fenton   YOB: 1955   Date of Visit: 10/25/2022       To Whom It May Concern:    Please excuse Jcarlos Xiong from work for the period from 10/10- 11/6/ 2022  May return to work on 11/7 0f 2022    If you have any questions or concerns, please don't hesitate to call.     Sincerely,        Yvan Harvey MD

## 2022-10-25 NOTE — PROGRESS NOTES
Subjective:    Bere Dominguez is a 79 y.o. female with  has a past medical history of Constipation, Diverticulitis, Diverticulosis, Diverticulosis, Fibroids, GERD (gastroesophageal reflux disease), Glaucoma, Hay fever, Hiatal hernia, Irritable bowel disease, Kidney infection, Lumbar stenosis, and MVP (mitral valve prolapse). Presented to the office today for:  Chief Complaint   Patient presents with    Foot Pain     Follow up right foot pain, FMLA paperwork     Health Maintenance     Pt declined vaccines        HPI    Follow up after Fall resulted in Rt foot and ankle pain:    Onset of the symptoms was 2 week ago. Precipitating event: injured on fall. Current symptoms include: ability to bear weight, but with some pain and pain at the lateral aspect of the ankle with localized swelling and small bump that results in sharp pain. Aggravating factors: walking. Symptoms have progressed to a point and plateaued. Patient has had prior foot problems with previous fall resulted in calcaneal fracture and surgery   Evaluation to date: plain films: normal.   Treatment to date: brace which is somewhat effective, ice, and OTC analgesics which are somewhat effective. Review of Systems   Musculoskeletal:  Positive for arthralgias, joint swelling and myalgias. Negative for back pain, gait problem, neck pain and neck stiffness.         Right foot pain, swelling and lump                The patient has a   Family History   Problem Relation Age of Onset    Breast Cancer Sister     Diabetes Sister     Diabetes Mother     Hypertension Mother     Tuberculosis Mother     Obesity Mother     Heart Disease Father     Arthritis Father     Diabetes Sister     Diabetes Sister     Colon Cancer Maternal Grandmother     Stroke Other     Kidney Disease Other        Objective:    /62 (Site: Left Upper Arm, Position: Sitting, Cuff Size: Medium Adult)   Pulse 73   Temp 98 °F (36.7 °C) (Oral)   Ht 5' 4\" (1.626 m)   Wt 177 lb 3.2 oz (80.4 kg)   LMP 04/05/2009   SpO2 100%   BMI 30.42 kg/m²    BP Readings from Last 3 Encounters:   10/25/22 117/62   10/19/22 133/67   10/18/22 134/76       Physical Exam  Musculoskeletal:      Comments: Bilateral foot and ankle exam:  Mild restricted ROM of the Right ankle secondar to pain   Normal reflexes, Dorsalis pedis pulse +2   Normal sensation, tone   Localized foot swelling and small lump on the lateral side of the Rt foot        Lab Results   Component Value Date    WBC 4.9 06/25/2016    HGB 12.5 06/25/2016    HCT 39.2 06/25/2016     06/25/2016    CHOL 206 (H) 07/16/2019    TRIG 74 07/16/2019    HDL 80 07/16/2019    ALT 19 06/25/2016    AST 17 06/25/2016     01/05/2021    K 4.4 01/05/2021     (H) 01/05/2021    CREATININE 0.74 01/05/2021    BUN 22 01/05/2021    CO2 25 01/05/2021    TSH 1.81 12/04/2015    INR 1.1 06/25/2016    LABA1C 5.7 10/25/2022     Lab Results   Component Value Date    CALCIUM 9.5 01/05/2021    PHOS 3.2 02/04/2014     Lab Results   Component Value Date    LDLCHOLESTEROL 111 07/16/2019       Assessment and Plan:    1. Right foot pain with Localized swelling and lump of right foot  DDX: calicification of the soft tissue, ,un proper bone healing, cyst   - Natural history and expected course discussed. Questions answered. - Home exercise plan outlined. - Rest, ice, compression, and elevation (RICE) therapy.  - OTC analgesics as needed. - Night-time foot splint prescribed. - Counseled on Gabapentin SE   - X-ray of the right foot: no fracture, dislocation, swelling or degenerative changes noted   - MRI of foot ordered. Will fill [facility] paperwork      Requested Prescriptions      No prescriptions requested or ordered in this encounter       There are no discontinued medications. Brannon Thomas received counseling on the following healthy behaviors: nutrition, exercise and medication adherence    Discussed use,benefit, and side effects of prescribed medications. Barriers to medication compliance addressed. All patient questions answered. Pt voiced understanding. Return if symptoms worsen or fail to improve. Disclaimer: Some orall of this note was transcribed using voice-recognition software. This may cause typographical errors occasionally. Although all effort is made to fix these errors, please do not hesitate to contact our office if there Kacey David concern with the understanding of this note.

## 2022-11-04 ENCOUNTER — HOSPITAL ENCOUNTER (OUTPATIENT)
Dept: MRI IMAGING | Age: 67
Discharge: HOME | End: 2022-11-06
Payer: COMMERCIAL

## 2022-11-04 DIAGNOSIS — R22.41 LOCALIZED SWELLING OF RIGHT FOOT: Primary | ICD-10-CM

## 2022-11-04 DIAGNOSIS — M79.671 RIGHT FOOT PAIN: ICD-10-CM

## 2022-11-04 PROCEDURE — 73718 MRI LOWER EXTREMITY W/O DYE: CPT

## 2022-11-04 NOTE — PROGRESS NOTES
Called patient and updated her on the result of the MRI foot that showed no fractures. Pt reported that she had foot fracture in the past that was missed twice on imaging and detected the 3rd time which needed to be fixed by surgery. Discussed results with Dr Kalyani Porter, who suggested that pt seen by Ortho for 2nd opinion given her concerns. Referral placed.

## 2022-11-09 DIAGNOSIS — M25.571 RIGHT ANKLE PAIN, UNSPECIFIED CHRONICITY: Primary | ICD-10-CM

## 2022-11-10 ENCOUNTER — OFFICE VISIT (OUTPATIENT)
Dept: ORTHOPEDIC SURGERY | Age: 67
End: 2022-11-10
Payer: COMMERCIAL

## 2022-11-10 VITALS — WEIGHT: 175 LBS | OXYGEN SATURATION: 100 % | HEIGHT: 65 IN | BODY MASS INDEX: 29.16 KG/M2 | RESPIRATION RATE: 16 BRPM

## 2022-11-10 DIAGNOSIS — S93.401A SPRAIN OF RIGHT ANKLE, UNSPECIFIED LIGAMENT, INITIAL ENCOUNTER: Primary | ICD-10-CM

## 2022-11-10 DIAGNOSIS — T14.8XXA AVULSION FRACTURE: ICD-10-CM

## 2022-11-10 PROCEDURE — 1123F ACP DISCUSS/DSCN MKR DOCD: CPT | Performed by: ORTHOPAEDIC SURGERY

## 2022-11-10 PROCEDURE — 99214 OFFICE O/P EST MOD 30 MIN: CPT | Performed by: ORTHOPAEDIC SURGERY

## 2022-11-10 NOTE — LETTER
[address] [place] [address] and Sports Medicine  [place] [address]  Phone: [phone]  Fax: [phone]    Alejandra Tay MD    November 10, 2022     Ronnell Salmeron, 1 [facility] [address] [facility] [address]    Patient: Alix Ch   MR Number: [id number]   YOB: 1955   Date of Visit: 11/10/2022       Dear Ronnell Salmeron:    Thank you for referring Kaylan Jay to me for evaluation/treatment. Below are the relevant portions of my assessment and plan of care. She has a right ankle sprain with dorsal talus capsular avulsion, sustained around 10/9/2022. Notably, the patient has a history of 3 surgeries on her right foot-- in 2015, 2018, and 2019. On 8/29/2017, Dr. Cecilia Stein performed a peroneus brevis debridement. On 6/19/2018, Dr. Cecilia Stein performed a right ankle arthroscopy with debridement, excision of peroneal tubercle, excision of possible ankle lipoma, and peroneal tenolysis. Notably, she has the past medical history as above. She has a history of mitral valve prolapse, lumbar stenosis, vitamin D deficiency, and prediabetes. We had a discussion today about the likely diagnosis and its natural history, physical exam and imaging findings, as well as various treatment options in detail. Surgically, we discussed that I do not recommend any surgery at this time, and did recommend conservative management. Orders/referrals were placed as below at today's visit. The patient was provided a lace up brace, for stability and pain control, and to help decrease the risk of reinjury. The patient may weight bear as tolerated, and was cautioned to avoid pain provoking activity. The patient was referred to physical therapy. I provided a prescription for Voltaren (4g TOPL q QID PRN pain). All questions were answered and the above plan was agreed upon.  The patient will return to clinic in 6 to 8 weeks as needed without x-rays. In the future, depending on how she is doing, we we will evaluate her lateral ankle ligaments for instability, and we may consider an MRI of the right ankle to evaluate her lateral ankle ligaments and peroneal tendons. If you have questions, please do not hesitate to call me. I look forward to following [facility] along with you.     Sincerely,      Roel Myers MD

## 2022-11-10 NOTE — PROGRESS NOTES
[address]  AND SPORTS MEDICINE  [address]  [address] [facility] [address]  Dept: [phone]    Ambulatory Orthopedic Consult      CHIEF COMPLAINT:    Chief Complaint   Patient presents with    Ankle Pain     Right    Foot Pain     Right         HISTORY OF PRESENT ILLNESS:      The patient is a 79 y.o. female who is being seen for evaluation of the above, which began around 10/9/2022 secondary to a fall from 1 step, twisting her ankle. At today's visit, she is using no brace/assistive device. History is obtained today from:   [x]  the patient     [x]  EMR     []  one family member/friend    []  multiple family members/friends    [x]  other: Multiple outside operative notes    At today's visit, she localizes her pain to the right anterolateral ankle, posterolateral ankle/hindfoot, as well as at the dorsal midfoot (reports a feeling of a mass at the dorsal lateral midfoot that was not there prior). Notably, the patient has previously seen Dr. Patricio Ghosh.  She reports a history of 3 surgeries on her right foot, in 2015, 2018, and 2019. On 8/29/2017, Dr. Patricio Ghosh performed a peroneus brevis debridement. On 6/19/2018, Dr. Patricio Ghosh performed a right ankle arthroscopy with debridement, excision of peroneal tubercle, excision of possible ankle lipoma, and peroneal tenolysis. REVIEW OF SYSTEMS:  Musculoskeletal: See HPI for pertinent positives     Past Medical History:    She  has a past medical history of Constipation, Diverticulitis, Diverticulosis (2009), Diverticulosis, Fibroids, GERD (gastroesophageal reflux disease), Glaucoma (2011), Hay fever, Hiatal hernia, Irritable bowel disease, Kidney infection, Lumbar stenosis, and MVP (mitral valve prolapse). Past Surgical History:    She  has a past surgical history that includes Hysterectomy;  Tonsillectomy and adenoidectomy (1973); tumor removal; Ovary surgery (1990's); Colonoscopy; lymph node biopsy (Right, 11/17/2010); Foot surgery (Left); and Total abdominal hysterectomy w/ bilateral salpingoophorectomy (04/02/2009). Current Medications:     Current Outpatient Medications:     EPINEPHrine (EPIPEN 2-ANSON) 0.3 MG/0.3ML SOAJ injection, Inject 0.3 mLs into the muscle once for 1 dose Use as directed for allergic reaction, Disp: 0.3 mL, Rfl: 0    ibuprofen (ADVIL;MOTRIN) 600 MG tablet, Take 1 tablet by mouth 3 times daily as needed for Pain, Disp: 90 tablet, Rfl: 0    diclofenac sodium (VOLTAREN) 1 % GEL, Apply 4 g topically 4 times daily as needed for Pain, Disp: 150 g, Rfl: 3    lidocaine (LIDODERM) 5 %, Place 1 patch onto the skin daily 12 hours on, 12 hours off., Disp: 30 patch, Rfl: 5    busPIRone (BUSPAR) 10 MG tablet, Take 1 tablet by mouth in the morning and 1 tablet before bedtime. , Disp: 60 tablet, Rfl: 3    gabapentin (NEURONTIN) 300 MG capsule, TAKE 1 CAPSULE TWICE DAILY, Disp: 90 capsule, Rfl: 1    acetaminophen (TYLENOL) 500 MG tablet, Take 2 tablets by mouth 3 times daily, Disp: 180 tablet, Rfl: 1    EPINEPHrine (EPIPEN 2-ANSON) 0.3 MG/0.3ML SOAJ injection, Inject 0.3 mLs into the muscle once for 1 dose Use as directed for allergic reaction, Disp: 0.3 mL, Rfl: 0    albuterol sulfate  (90 Base) MCG/ACT inhaler, Inhale 2 puffs into the lungs every 6 hours as needed for Wheezing, Disp: 1 Inhaler, Rfl: 1    mirabegron (MYRBETRIQ) 50 MG TB24, Take 50 mg by mouth daily , Disp: , Rfl:     cetirizine (ZYRTEC) 10 MG tablet, Take 10 mg by mouth daily. , Disp: , Rfl:     Multiple Vitamin (MULTI-VITAMIN DAILY PO), Take  by mouth., Disp: , Rfl:     Mometasone Furoate (NASONEX NA), by Nasal route., Disp: , Rfl:     LUMIGAN 0.01 % SOLN, Place 1 drop into both eyes nightly., Disp: , Rfl:      Allergies:    Latex, Cat hair extract, Codeine, and Eggs or egg-derived products [eggs or egg-derived products]    Family History:  family history includes Arthritis in her father; Breast Cancer in her sister; Colon Cancer in her maternal grandmother; Diabetes in her mother, sister, sister, and sister; Heart Disease in her father; Hypertension in her mother; Kidney Disease in an other family member; Obesity in her mother; Stroke in an other family member; Tuberculosis in her mother. Social History:   Social History     Occupational History    Not on file   Tobacco Use    Smoking status: Never    Smokeless tobacco: Never   Vaping Use    Vaping Use: Never used   Substance and Sexual Activity    Alcohol use: Not Currently     Comment: socially    Drug use: No    Sexual activity: Not Currently     Partners: Male     Works as a , mainly on her feet    OBJECTIVE:  Resp 16   Ht 5' 5\" (1.651 m)   Wt 175 lb (79.4 kg)   LMP 04/05/2009   SpO2 100%   BMI 29.12 kg/m²    Psych: awake, alert  Cardio:  well perfused extremities, no cyanosis  Resp:  normal respiratory effort  Musculoskeletal:    RLE:  Vascular: Limb well perfused, compartments soft/compressible. Skin: No erythema/ulcers. Intact. Neurovascular Status:  Grossly neurovascularly intact throughout   Tenderness to Palpation: Lateral ankle/hindfoot diffusely, dorsal lateral midfoot with small mass palpated (rubbery, nonmobile, subcentimeter)  -No significant swelling      LLE:  Vascular: Limb well perfused, compartments soft/compressible. Skin: No erythema/ulcers. Intact. Neurovascular Status:  Grossly neurovascularly intact throughout   Tenderness to Palpation:          RADIOLOGY:   11/10/2022 FINDINGS:  Three weightbearing views (AP, Mortise, and Lateral) of the right ankle and three weightbearing views (AP, Oblique, Lateral) of the right foot were obtained in the office today and reviewed, revealing no acute displaced fracture, dislocation, radiopaque foreign body/tumor. Calcification dorsal to the talar neck, likely representing age-indeterminate capsular avulsion fracture.     IMPRESSION: Age-indeterminate talus capsular avulsion fracture. Electronically signed by Jamison Salas MD        Relevant previous imaging reviewed, both imaging and report(s) as below:    MRI FOOT RIGHT WO CONTRAST  Result Date: 11/4/2022  1. Soft tissue markers at the lateral aspect of the midfoot and forefoot. No subjacent organized fluid collection or soft tissue mass. There is prominent subcutaneous fat intervening/subjacent to the soft tissue markers. 2. Mild-to-moderate degenerative changes of the 1st MTP/MTS joints. Mild-to-moderate degenerative changes of the 4th TMT joint. 3. No MR evidence for a metatarsal stress fracture. 4. No clear MR evidence for perineural fibrosis. ASSESSMENT AND PLAN:  Body mass index is 29.12 kg/m². She has a right ankle sprain with dorsal talus capsular avulsion, sustained around 10/9/2022. Notably, the patient has a history of 3 surgeries on her right foot-- in 2015, 2018, and 2019. On 8/29/2017, Dr. Wilver Limon performed a peroneus brevis debridement. On 6/19/2018, Dr. Wilver Limon performed a right ankle arthroscopy with debridement, excision of peroneal tubercle, excision of possible ankle lipoma, and peroneal tenolysis. Notably, she has the past medical history as above. She has a history of mitral valve prolapse, lumbar stenosis, vitamin D deficiency, and prediabetes. We had a discussion today about the likely diagnosis and its natural history, physical exam and imaging findings, as well as various treatment options in detail. Surgically, we discussed that I do not recommend any surgery at this time, and did recommend conservative management. Orders/referrals were placed as below at today's visit. The patient was provided a lace up brace, for stability and pain control, and to help decrease the risk of reinjury. The patient may weight bear as tolerated, and was cautioned to avoid pain provoking activity. The patient was referred to physical therapy.  I provided a prescription for Voltaren (4g TOPL q QID PRN pain). All questions were answered and the above plan was agreed upon. The patient will return to clinic in 6 to 8 weeks as needed without x-rays. In the future, depending on how she is doing, we we will evaluate her lateral ankle ligaments for instability, and we may consider an MRI of the right ankle to evaluate her lateral ankle ligaments and peroneal tendons. At the patient's next visit, depending on how the patient is doing and/or new imaging/labs results, we may consider the following options:    []  Lace up ankle     []  CAM boot         []  removable wrist brace     []  PT:        []  Wean out immobilization         []  Adv activity      []  Footmind        []  Spenco       []  Custom Orthotic:               []  AZ brace                    []  Rocker Bottom      []  Night splint    []  Heel cups        []  Strap        []  Toe gizmos    []  Topl        []  NSAIDs         []  Lisandro        []  Ref:         []  Stress Xray    []  CT        []  MRI  []  Inj:          []  Consider OR      []  Pick OR date    No follow-ups on file. No orders of the defined types were placed in this encounter. No orders of the defined types were placed in this encounter. Flash Mckay MD  Orthopedic Surgery        Please excuse any typos/errors, as this note was created with the assistance of voice recognition software. While intending to generate a document that actually reflects the content of the visit, the document can still have some errors including those of syntax and sound-a-like substitutions which may escape proof reading. In such instances, actual meaning can be extrapolated by context.

## 2022-11-10 NOTE — LETTER
[address] [place] [address] and Sports Medicine  [address] [address]  Phone: [phone]  Fax: [phone]           Carolina Mary MD      November 10, 2022     Patient: Kai Gomez   MR Number: [id number]   YOB: 1955   Date of Visit: 11/10/2022       Dear Dr. Jason lAvarez:    Thank you for referring Ida Lynn to me for evaluation/treatment. Below are the relevant portions of my assessment and plan of care. She has a right ankle sprain with dorsal talus capsular avulsion, sustained around 10/9/2022. Notably, the patient has a history of 3 surgeries on her right foot-- in 2015, 2018, and 2019. On 8/29/2017, Dr. Tanna Gale performed a peroneus brevis debridement. On 6/19/2018, Dr. Tanna Gale performed a right ankle arthroscopy with debridement, excision of peroneal tubercle, excision of possible ankle lipoma, and peroneal tenolysis. Notably, she has the past medical history as above. She has a history of mitral valve prolapse, lumbar stenosis, vitamin D deficiency, and prediabetes. We had a discussion today about the likely diagnosis and its natural history, physical exam and imaging findings, as well as various treatment options in detail. Surgically, we discussed that I do not recommend any surgery at this time, and did recommend conservative management. Orders/referrals were placed as below at today's visit. The patient was provided a lace up brace, for stability and pain control, and to help decrease the risk of reinjury. The patient may weight bear as tolerated, and was cautioned to avoid pain provoking activity. The patient was referred to physical therapy. I provided a prescription for Voltaren (4g TOPL q QID PRN pain). All questions were answered and the above plan was agreed upon. The patient will return to clinic in 6 to 8 weeks as needed without x-rays.   In the future, depending on how she is doing, we we will evaluate her lateral ankle ligaments for instability, and we may consider an MRI of the right ankle to evaluate her lateral ankle ligaments and peroneal tendons. If you have questions, please do not hesitate to call me. I look forward to following Tray Parsons along with you. Sincerely,        Lindon Baumgarten, MD     providers:   Darrell Hilario MD  [address] [address]  [address] [place]

## 2022-11-15 ENCOUNTER — OFFICE VISIT (OUTPATIENT)
Dept: FAMILY MEDICINE CLINIC | Age: 67
End: 2022-11-15
Payer: COMMERCIAL

## 2022-11-15 VITALS
WEIGHT: 177.4 LBS | HEIGHT: 65 IN | BODY MASS INDEX: 29.56 KG/M2 | SYSTOLIC BLOOD PRESSURE: 112 MMHG | HEART RATE: 78 BPM | DIASTOLIC BLOOD PRESSURE: 59 MMHG

## 2022-11-15 DIAGNOSIS — M79.671 RIGHT FOOT PAIN: ICD-10-CM

## 2022-11-15 DIAGNOSIS — B35.1 ONYCHOMYCOSIS OF TOENAIL: Primary | ICD-10-CM

## 2022-11-15 PROBLEM — L60.8 ONYCHOMADESIS OF TOENAIL: Status: ACTIVE | Noted: 2022-11-15

## 2022-11-15 PROCEDURE — 99213 OFFICE O/P EST LOW 20 MIN: CPT

## 2022-11-15 PROCEDURE — 1123F ACP DISCUSS/DSCN MKR DOCD: CPT

## 2022-11-15 ASSESSMENT — ENCOUNTER SYMPTOMS
CHEST TIGHTNESS: 0
SHORTNESS OF BREATH: 0
NAUSEA: 0
VOMITING: 0
ABDOMINAL PAIN: 0
DIARRHEA: 0

## 2022-11-15 NOTE — PATIENT INSTRUCTIONS
Thank you for letting us take care of you today. We hope all your questions were addressed. If a question was overlooked or something else comes to mind after you return home, please contact a member of your Care Team listed below. Your Care Team at [place] [address] is Team #1  Eric Saenz MD (Faculty)  Shana Harman MD(Resident)  Jaylen Mason MD (Resident)  Jolynn Martin DO (Resident)  Rosy Cohen, DARELL Hernandez., DARELL Royal., FAIHT Williamson., Malika Garrett., [facility] office)  Dmitri Patel, [address] (Clinical Practice Manager)  Jeanette Rangel [facility] (Clinical Pharmacist)     Office phone number: [phone]    If you need to get in right away due to illness, please be advised we have \"Same Day\" appointments available Monday-Friday. Please call us at [phone] option #3 to schedule your \"Same Day\" appointment.

## 2022-11-15 NOTE — PROGRESS NOTES
[address] [facility] - Outpatient Note      Subjective:    Dwayne Morris is a 79 y.o. female with  has a past medical history of Constipation, Diverticulitis, Diverticulosis, Diverticulosis, Fibroids, GERD (gastroesophageal reflux disease), Glaucoma, Hay fever, Hiatal hernia, Irritable bowel disease, Kidney infection, Lumbar stenosis, and MVP (mitral valve prolapse). Presented to the office today for:  Chief Complaint   Patient presents with    Foot Pain     Right foot pain follow up       HPI    Is a 80-year-old female seen in the clinic for follow-up on right foot pain, patient says her foot pain is improving but not completely resolved. Patient has follow-up with podiatry and orthopedic surgery, they are currently managing with conservative management. Patient was advised that if the pain does not improve or worsens that she is to go back to orthopedic surgery for further management. Patient also has concerns about onychomycosis of toes bilaterally, patient states that she using efinaconazole topically which she found worked better for her than oral agents like terbinafine. Patient states that her toenail fungus has not completely resolved and is requesting refill. Review of Systems   Constitutional:  Negative for chills and fever. Respiratory:  Negative for chest tightness and shortness of breath. Cardiovascular:  Negative for chest pain, palpitations and leg swelling. Gastrointestinal:  Negative for abdominal pain, diarrhea, nausea and vomiting. Genitourinary:  Negative for difficulty urinating. Neurological:  Negative for dizziness and headaches.      The patient has a   Family History   Problem Relation Age of Onset    Breast Cancer Sister     Diabetes Sister     Diabetes Mother     Hypertension Mother     Tuberculosis Mother     Obesity Mother     Heart Disease Father     Arthritis Father     Diabetes Sister     Diabetes Sister     Colon Cancer Maternal Grandmother     Stroke Other     Kidney Disease Other        Objective:    BP (!) 112/59 (Site: Right Upper Arm, Position: Sitting, Cuff Size: Medium Adult) Comment: m  Pulse 78   Ht 5' 5\" (1.651 m)   Wt 177 lb 6.4 oz (80.5 kg)   LMP 04/05/2009   BMI 29.52 kg/m²    BP Readings from Last 3 Encounters:   11/15/22 (!) 112/59   10/25/22 117/62   10/19/22 133/67       Physical Exam  Constitutional:       Appearance: Normal appearance. Cardiovascular:      Rate and Rhythm: Normal rate and regular rhythm. Pulses: Normal pulses. Heart sounds: Normal heart sounds. No murmur heard. No gallop. Pulmonary:      Effort: Pulmonary effort is normal.      Breath sounds: Normal breath sounds. No wheezing, rhonchi or rales. Abdominal:      General: There is no distension. Palpations: Abdomen is soft. Tenderness: There is no abdominal tenderness. Musculoskeletal:      Right lower leg: No edema. Left lower leg: No edema. Feet:      Right foot:      Toenail Condition: Fungal disease present. Left foot:      Toenail Condition: Fungal disease present. Skin:     General: Skin is warm. Neurological:      Mental Status: She is alert. Psychiatric:         Mood and Affect: Mood normal.       Lab Results   Component Value Date    WBC 4.9 06/25/2016    HGB 12.5 06/25/2016    HCT 39.2 06/25/2016     06/25/2016    CHOL 206 (H) 07/16/2019    TRIG 74 07/16/2019    HDL 80 07/16/2019    ALT 19 06/25/2016    AST 17 06/25/2016     01/05/2021    K 4.4 01/05/2021     (H) 01/05/2021    CREATININE 0.74 01/05/2021    BUN 22 01/05/2021    CO2 25 01/05/2021    TSH 1.81 12/04/2015    INR 1.1 06/25/2016    LABA1C 5.7 10/25/2022     Lab Results   Component Value Date    CALCIUM 9.5 01/05/2021    PHOS 3.2 02/04/2014     Lab Results   Component Value Date    LDLCHOLESTEROL 111 07/16/2019       Assessment and Plan:    1.  Onychomycosis of toenail  Onychomycosis of big toe toenails bilaterally, patient preferring topical solution she states that she had better relief with that versus oral agents. Toenail fungus still present, will provide refill, inform patient that typically this therapy is for about 48 weeks. - Efinaconazole 10 % SOLN; Apply topically to affected toenails daily  Dispense: 8 mL; Refill: 3    2. Right foot pain  Right foot pain is improving, patient is followed up with orthopedic surgery and podiatry. Conservative management for now, patient informed that should her pain not improve or worsen that she is to follow-up with orthopedic surgery for further management. Requested Prescriptions     Signed Prescriptions Disp Refills    Efinaconazole 10 % SOLN 8 mL 3     Sig: Apply topically to affected toenails daily       There are no discontinued medications. Park Phlegm received counseling on the following healthy behaviors: nutrition, exercise and medication adherence    Discussed use,benefit, and side effects of prescribed medications. Barriers to medication compliance addressed. All patient questions answered. Pt voiced understanding. No follow-ups on file. Disclaimer: Some orall of this note was transcribed using voice-recognition software. This may cause typographical errors occasionally. Although all effort is made to fix these errors, please do not hesitate to contact our office if there Alan Zayas concern with the understanding of this note.

## 2022-11-15 NOTE — PROGRESS NOTES
Visit Information    Have you changed or started any medications since your last visit including any over-the-counter medicines, vitamins, or herbal medicines? no   Have you stopped taking any of your medications? Is so, why? -  no  Are you having any side effects from any of your medications? - no    Have you seen any other physician or provider since your last visit? yes - Orthopedic   Have you had any other diagnostic tests since your last visit? yes - XR, MRI   Have you been seen in the emergency room and/or had an admission in a hospital since we last saw you?  no   Have you had your routine dental cleaning in the past 6 months?  no     Do you have an active MyChart account? If no, what is the barrier?   Yes    Patient Care Team:  Dexter Long,  as PCP - [address] [place] [address], DO as PCP - [facility] Provider    Medical History Review  Past Medical, Family, and Social History reviewed and does not contribute to the patient presenting condition    Health Maintenance   Topic Date Due    Hepatitis C screen  Never done    DTaP/Tdap/Td vaccine (1 - Tdap) Never done    Shingles vaccine (1 of 2) Never done    Pneumococcal 65+ years Vaccine (1 - PCV) Never done    COVID-19 Vaccine (4 - Booster for Pedersen Peter series) 02/22/2022    Flu vaccine (1) 08/01/2022    Colorectal Cancer Screen  04/08/2023    Breast cancer screen  07/07/2023    A1C test (Diabetic or Prediabetic)  10/25/2023    Depression Screen  10/25/2023    Lipids  12/17/2026    DEXA (modify frequency per FRAX score)  Completed    Hepatitis A vaccine  Aged Out    Hib vaccine  Aged Out    Meningococcal (ACWY) vaccine  Aged Out

## 2022-11-18 ENCOUNTER — TELEPHONE (OUTPATIENT)
Dept: FAMILY MEDICINE CLINIC | Age: 67
End: 2022-11-18

## 2022-11-18 DIAGNOSIS — B35.1 ONYCHOMYCOSIS OF TOENAIL: Primary | ICD-10-CM

## 2022-11-22 NOTE — TELEPHONE ENCOUNTER
Per [employer] Denied the Jublia Solution, needs to try Covered alternatives- Generic Penlac, Sporanox, Lamisil.  Please Advise

## 2022-12-21 ENCOUNTER — OFFICE VISIT (OUTPATIENT)
Dept: FAMILY MEDICINE CLINIC | Age: 67
End: 2022-12-21
Payer: COMMERCIAL

## 2022-12-21 VITALS
DIASTOLIC BLOOD PRESSURE: 68 MMHG | SYSTOLIC BLOOD PRESSURE: 110 MMHG | TEMPERATURE: 98.2 F | OXYGEN SATURATION: 98 % | HEART RATE: 89 BPM

## 2022-12-21 DIAGNOSIS — R68.83 CHILLS (WITHOUT FEVER): ICD-10-CM

## 2022-12-21 DIAGNOSIS — J02.9 ACUTE PHARYNGITIS, UNSPECIFIED ETIOLOGY: Primary | ICD-10-CM

## 2022-12-21 DIAGNOSIS — H65.93 OTITIS MEDIA WITH EFFUSION, BILATERAL: ICD-10-CM

## 2022-12-21 LAB
INFLUENZA A ANTIBODY: NEGATIVE
INFLUENZA B ANTIBODY: NEGATIVE

## 2022-12-21 PROCEDURE — 87804 INFLUENZA ASSAY W/OPTIC: CPT

## 2022-12-21 PROCEDURE — 99213 OFFICE O/P EST LOW 20 MIN: CPT

## 2022-12-21 PROCEDURE — 1123F ACP DISCUSS/DSCN MKR DOCD: CPT

## 2022-12-21 RX ORDER — AMOXICILLIN AND CLAVULANATE POTASSIUM 875; 125 MG/1; MG/1
1 TABLET, FILM COATED ORAL 2 TIMES DAILY
Qty: 20 TABLET | Refills: 0 | Status: SHIPPED | OUTPATIENT
Start: 2022-12-21 | End: 2022-12-31

## 2022-12-21 RX ORDER — FLUTICASONE PROPIONATE 50 MCG
2 SPRAY, SUSPENSION (ML) NASAL DAILY
Qty: 16 G | Refills: 0 | Status: SHIPPED | OUTPATIENT
Start: 2022-12-21

## 2022-12-21 ASSESSMENT — ENCOUNTER SYMPTOMS
EYE REDNESS: 0
CHOKING: 0
APNEA: 0
RHINORRHEA: 0
NAUSEA: 0
CHEST TIGHTNESS: 0
EYE DISCHARGE: 0
EYE PAIN: 0
WHEEZING: 0
FACIAL SWELLING: 0
HOARSE VOICE: 0
SORE THROAT: 1
SINUS PRESSURE: 0
EYES NEGATIVE: 1
SWOLLEN GLANDS: 0
VOICE CHANGE: 0
CONSTIPATION: 0
BACK PAIN: 0
SHORTNESS OF BREATH: 0
RECTAL PAIN: 0
STRIDOR: 0
EYE ITCHING: 0
SINUS COMPLAINT: 1
ABDOMINAL DISTENTION: 0
DIARRHEA: 0
VOMITING: 0
GASTROINTESTINAL NEGATIVE: 1
RESPIRATORY NEGATIVE: 1
COUGH: 0
SINUS PAIN: 0
BLOOD IN STOOL: 0
ABDOMINAL PAIN: 0
COLOR CHANGE: 0
ANAL BLEEDING: 0
TROUBLE SWALLOWING: 0
PHOTOPHOBIA: 0

## 2022-12-21 NOTE — LETTER
[facility]  [address] [address]  Phone: [phone]  Fax: [phone]    ABDI Vidal CNP        December 21, 2022     Patient: Tristan Kay   YOB: 1955   Date of Visit: 12/21/2022       To Whom It May Concern: It is my medical opinion that Jessica Rios be excused 12/21/2022-12/22/2022 due to medical reasons. If you have any questions or concerns, please don't hesitate to call.     Sincerely,        ABDI Vidal CNP

## 2022-12-21 NOTE — PROGRESS NOTES
[facility] WALK-IN FAMILY MEDICINE  [address] [place] [address],[address]  [facility] WALK-IN FAMILY MEDICINE  [address] [address]  Dept: [phone]    Lucy Duron is a 79 y.o. female Established patient, who presents to the walk-in clinic today with conditions/complaints as noted below:    Chief Complaint   Patient presents with    Sinus Problem     Sneezing     Chills     Onset yesterday    Headache    Pharyngitis    Otalgia         HPI:     Sinus Problem  This is a new problem. Episode onset: 3 days ago. The problem has been gradually worsening since onset. There has been no fever. She is experiencing no pain. Associated symptoms include chills, ear pain, headaches, sneezing and a sore throat. Pertinent negatives include no congestion, coughing, diaphoresis, hoarse voice, neck pain, shortness of breath, sinus pressure or swollen glands. Treatments tried: Ibuprofen 600 mg, lidocaine patch. The treatment provided mild relief. At home COVID negative  Declines PCR testing  Past Medical History:   Diagnosis Date    Constipation     Diverticulitis     Diverticulosis 2009    Diverticulosis     Fibroids     GERD (gastroesophageal reflux disease)     Glaucoma 2011    Hay fever     Hiatal hernia     Irritable bowel disease     Kidney infection     Lumbar stenosis     MVP (mitral valve prolapse)        Current Outpatient Medications   Medication Sig Dispense Refill    amoxicillin-clavulanate (AUGMENTIN) 875-125 MG per tablet Take 1 tablet by mouth 2 times daily for 10 days 20 tablet 0    fluticasone (FLONASE) 50 MCG/ACT nasal spray 2 sprays by Each Nostril route daily 16 g 0    ciclopirox (PENLAC) 8 % solution Apply topically nightly.  6 mL 1    Efinaconazole 10 % SOLN Apply topically to affected toenails daily 8 mL 3    ibuprofen (ADVIL;MOTRIN) 600 MG tablet Take 1 tablet by mouth 3 times daily as needed for Pain 90 tablet 0    diclofenac sodium (VOLTAREN) 1 % GEL Apply 4 g topically 4 times daily as needed for Pain 150 g 3    lidocaine (LIDODERM) 5 % Place 1 patch onto the skin daily 12 hours on, 12 hours off. 30 patch 5    acetaminophen (TYLENOL) 500 MG tablet Take 2 tablets by mouth 3 times daily 180 tablet 1    albuterol sulfate  (90 Base) MCG/ACT inhaler Inhale 2 puffs into the lungs every 6 hours as needed for Wheezing 1 Inhaler 1    mirabegron (MYRBETRIQ) 50 MG TB24 Take 50 mg by mouth daily       cetirizine (ZYRTEC) 10 MG tablet Take 10 mg by mouth daily. Multiple Vitamin (MULTI-VITAMIN DAILY PO) Take  by mouth. Mometasone Furoate (NASONEX NA) by Nasal route. LUMIGAN 0.01 % SOLN Place 1 drop into both eyes nightly. EPINEPHrine (EPIPEN 2-ANSON) 0.3 MG/0.3ML SOAJ injection Inject 0.3 mLs into the muscle once for 1 dose Use as directed for allergic reaction 0.3 mL 0    gabapentin (NEURONTIN) 300 MG capsule TAKE 1 CAPSULE TWICE DAILY 90 capsule 1    EPINEPHrine (EPIPEN 2-ANSON) 0.3 MG/0.3ML SOAJ injection Inject 0.3 mLs into the muscle once for 1 dose Use as directed for allergic reaction 0.3 mL 0     Current Facility-Administered Medications   Medication Dose Route Frequency Provider Last Rate Last Admin    bupivacaine (MARCAINE) 0.25 % injection 5 mg  2 mL Intra-artICUlar Once Maridee Barge, DO        methylPREDNISolone acetate (DEPO-MEDROL) injection 80 mg  80 mg Intra-artICUlar Once Maridee Barge, DO           Allergies   Allergen Reactions    Latex      Swelling/rash    Cat Hair Extract Anaphylaxis and Swelling    Codeine     Eggs Or Egg-Derived Products [Eggs Or Egg-Derived Products] Diarrhea       Review of Systems:     Review of Systems   Constitutional:  Positive for chills. Negative for activity change, appetite change, diaphoresis, fatigue, fever and unexpected weight change.    HENT:  Positive for ear pain, sneezing and sore throat. Negative for congestion, dental problem, drooling, ear discharge, facial swelling, hearing loss, hoarse voice, mouth sores, nosebleeds, postnasal drip, rhinorrhea, sinus pressure, sinus pain, tinnitus, trouble swallowing and voice change. Eyes: Negative. Negative for photophobia, pain, discharge, redness, itching and visual disturbance. Respiratory: Negative. Negative for apnea, cough, choking, chest tightness, shortness of breath, wheezing and stridor. Cardiovascular: Negative. Negative for chest pain, palpitations and leg swelling. Gastrointestinal: Negative. Negative for abdominal distention, abdominal pain, anal bleeding, blood in stool, constipation, diarrhea, nausea, rectal pain and vomiting. Musculoskeletal: Negative. Negative for arthralgias, back pain, gait problem, joint swelling, myalgias, neck pain and neck stiffness. Skin:  Positive for rash. Negative for color change, pallor and wound. Neurological:  Positive for headaches. Negative for dizziness, tremors, seizures, syncope, facial asymmetry, speech difficulty, weakness, light-headedness and numbness. Physical Exam:      /68 (Site: Left Upper Arm, Position: Sitting, Cuff Size: Medium Adult)   Pulse 89   Temp 98.2 °F (36.8 °C) (Tympanic)   LMP 04/05/2009   SpO2 98%     Physical Exam  Vitals reviewed. Constitutional:       Appearance: Normal appearance. HENT:      Head: Normocephalic and atraumatic. Right Ear: Tympanic membrane, ear canal and external ear normal.      Left Ear: Tympanic membrane, ear canal and external ear normal.      Nose: Congestion present. Mouth/Throat:      Lips: Pink. Mouth: Mucous membranes are moist.      Pharynx: Oropharynx is clear. Uvula midline. Posterior oropharyngeal erythema present. No pharyngeal swelling, oropharyngeal exudate or uvula swelling. Tonsils: No tonsillar exudate or tonsillar abscesses.    Eyes:      Extraocular Movements: Extraocular movements intact. Conjunctiva/sclera: Conjunctivae normal.      Pupils: Pupils are equal, round, and reactive to light. Cardiovascular:      Rate and Rhythm: Normal rate and regular rhythm. Pulses: Normal pulses. Heart sounds: Normal heart sounds. Pulmonary:      Effort: Pulmonary effort is normal.      Breath sounds: Normal breath sounds and air entry. Abdominal:      General: Abdomen is flat. Bowel sounds are normal.      Palpations: Abdomen is soft. Musculoskeletal:         General: Normal range of motion. Cervical back: Normal range of motion and neck supple. Lymphadenopathy:      Cervical: Cervical adenopathy present. Skin:     General: Skin is warm and dry. Capillary Refill: Capillary refill takes less than 2 seconds. Neurological:      General: No focal deficit present. Mental Status: She is alert and oriented to person, place, and time. Mental status is at baseline. Psychiatric:         Mood and Affect: Mood normal.         Behavior: Behavior normal.       Plan:          1. Acute pharyngitis, unspecified etiology  -     amoxicillin-clavulanate (AUGMENTIN) 875-125 MG per tablet; Take 1 tablet by mouth 2 times daily for 10 days, Disp-20 tablet, R-0Normal  2. Chills (without fever)  -     POCT Influenza A/B  3. Otitis media with effusion, bilateral  -     fluticasone (FLONASE) 50 MCG/ACT nasal spray; 2 sprays by Each Nostril route daily, Disp-16 g, R-0Normal     Results for POC orders placed in visit on 12/21/22   POCT Influenza A/B   Result Value Ref Range    Influenza A Ab negative     Influenza B Ab negative      Continue over-the-counter medications as needed for symptoms. Use honey to the back of throat, salt water gargle as needed for sore throat, cough. Go to the ER for shortness of breath, chest pain. Patient verbalized understanding. Follow Up Instructions:      No follow-ups on file.     Orders Placed This Encounter   Medications amoxicillin-clavulanate (AUGMENTIN) 875-125 MG per tablet     Sig: Take 1 tablet by mouth 2 times daily for 10 days     Dispense:  20 tablet     Refill:  0    fluticasone (FLONASE) 50 MCG/ACT nasal spray     Si sprays by Each Nostril route daily     Dispense:  16 g     Refill:  0             Based on the clinical exam findings-- I will treat this as a bacterial pharyngitis despite the negative rapid strep. Patient instructed to complete entire antibiotic course. Tylenol/Motrin as needed for fever/discomfort. Salt water gargles and throat lozenges if desired. Change toothbrush in 24 hours. Patient agreeable to treatment plan. Educational materials provided on AVS.  Follow up if symptoms do not improve/worsen. Patient and/or parent given educational materials - see patient instructions. Discussed use, benefit, and side effects of prescribed medications. All patient questions answered. Patient and/or parent voiced understanding.       Electronically signed by ABDI Frank 2022 at 3:44 PM

## 2022-12-21 NOTE — PATIENT INSTRUCTIONS
Patient Education        Sore Throat: Care Instructions  Overview     Infection by bacteria or a virus causes most sore throats. Cigarette smoke, dry air, air pollution, allergies, and yelling can also cause a sore throat. Sore throats can be painful and annoying. Fortunately, most sore throats go away on their own. If you have a bacterial infection, your doctor may prescribe antibiotics. Follow-up care is a key part of your treatment and safety. Be sure to make and go to all appointments, and call your doctor if you are having problems. It's also a good idea to know your test results and keep a list of the medicines you take. How can you care for yourself at home? If your doctor prescribed antibiotics, take them as directed. Do not stop taking them just because you feel better. You need to take the full course of antibiotics. Gargle with warm salt water several times a day to help reduce swelling and relieve pain. Mix 1/2 teaspoon of salt in 1 cup of warm water. Take an over-the-counter pain medicine, such as acetaminophen (Tylenol), ibuprofen (Advil, Motrin), or naproxen (Aleve). Read and follow all instructions on the label. Be careful when taking over-the-counter cold or flu medicines and Tylenol at the same time. Many of these medicines have acetaminophen, which is Tylenol. Read the labels to make sure that you are not taking more than the recommended dose. Too much acetaminophen (Tylenol) can be harmful. Drink plenty of fluids. Fluids may help soothe an irritated throat. Hot fluids, such as tea or soup, may help decrease throat pain. Use over-the-counter throat lozenges to soothe pain. Regular cough drops or hard candy may also help. These should not be given to young children because of the risk of choking. Do not smoke or allow others to smoke around you. If you need help quitting, talk to your doctor about stop-smoking programs and medicines.  These can increase your chances of quitting for good.  Use a vaporizer or humidifier to add moisture to your bedroom. Follow the directions for cleaning the machine. When should you call for help? Call your doctor now or seek immediate medical care if:    You have trouble breathing. Your sore throat gets much worse on one side. You have new or worse trouble swallowing. You have a new or higher fever. Watch closely for changes in your health, and be sure to contact your doctor if you do not get better as expected. Where can you learn more? Go to http://www.woods.com/ and enter U420 to learn more about \"Sore Throat: Care Instructions. \"  Current as of: May 4, 2022               Content Version: 13.5  © 2006-2022 [employer]wise[employer] Incorporated. Care instructions adapted under license by [facility] ([facility]). If you have questions about a medical condition or this instruction, always ask your healthcare professional. [address] any warranty or liability for your use of this information.

## 2022-12-22 ENCOUNTER — TELEPHONE (OUTPATIENT)
Dept: FAMILY MEDICINE CLINIC | Age: 67
End: 2022-12-22

## 2022-12-22 NOTE — TELEPHONE ENCOUNTER
Patient went to urgent care on 12/22/2022, patient stated the medication she was given is not helping, she woke up feeling worse this morning. Patient is concerned if she should be on a different antibiotic.     Please advsie

## 2022-12-27 ENCOUNTER — TELEMEDICINE (OUTPATIENT)
Dept: FAMILY MEDICINE CLINIC | Age: 67
End: 2022-12-27
Payer: COMMERCIAL

## 2022-12-27 DIAGNOSIS — J06.9 VIRAL URI: Primary | ICD-10-CM

## 2022-12-27 PROCEDURE — 99422 OL DIG E/M SVC 11-20 MIN: CPT

## 2022-12-27 RX ORDER — ONDANSETRON 4 MG/1
4 TABLET, ORALLY DISINTEGRATING ORAL 3 TIMES DAILY PRN
Qty: 21 TABLET | Refills: 0 | Status: SHIPPED | OUTPATIENT
Start: 2022-12-27

## 2022-12-27 RX ORDER — GUAIFENESIN/DEXTROMETHORPHAN 100-10MG/5
5 SYRUP ORAL 3 TIMES DAILY PRN
Qty: 118 ML | Refills: 1 | Status: SHIPPED | OUTPATIENT
Start: 2022-12-27

## 2022-12-27 ASSESSMENT — ENCOUNTER SYMPTOMS
NAUSEA: 1
DIARRHEA: 0
ABDOMINAL PAIN: 0
SHORTNESS OF BREATH: 0
VOMITING: 0
COUGH: 1

## 2022-12-27 NOTE — PATIENT INSTRUCTIONS
Thank you for letting us take care of you today. We hope all your questions were addressed. If a question was overlooked or something else comes to mind after you return home, please contact a member of your Care Team listed below. Your Care Team at [place] [address] is Team #2  Enrike Lara DO (Faculty)  Jace Muñoz (Faculty)  Amirah Moralez MD (Resident)  Ana Johnson MD (Resident)  Mayra Singh MD (Resident)  Tamanna Marley MD (Resident)  Nadia Hunt., YIMI Conteh.,  JAI Bills., JORDANN  Andres Castro., Renata [facility] office)  Tj Osorio, [address] (Clinical Practice Manager)  Beverly Olvera, [address] [place] [address] (Clinical Pharmacist)     Office phone number: [phone]    If you need to get in right away due to illness, please be advised we have \"Same Day\" appointments available Monday-Friday. Please call us at [phone] option #3 to schedule your \"Same Day\" appointment.

## 2022-12-27 NOTE — PROGRESS NOTES
Visit Information    Have you changed or started any medications since your last visit including any over-the-counter medicines, vitamins, or herbal medicines? no   Have you stopped taking any of your medications? Is so, why? -  no  Are you having any side effects from any of your medications? - no    Have you seen any other physician or provider since your last visit? yes - Walk-In   Have you had any other diagnostic tests since your last visit? yes - Labs   Have you been seen in the emergency room and/or had an admission in a hospital since we last saw you?  no   Have you had your routine dental cleaning in the past 6 months?  no     Do you have an active MyChart account? If no, what is the barrier?   Yes    Patient Care Team:  Anais Fischer DO as PCP - [address] [place], [place] [address]DO as PCP - [facility]    Medical History Review  Past Medical, Family, and Social History reviewed and does not contribute to the patient presenting condition    Health Maintenance   Topic Date Due    Hepatitis C screen  Never done    DTaP/Tdap/Td vaccine (1 - Tdap) Never done    Shingles vaccine (1 of 2) Never done    Pneumococcal 65+ years Vaccine (1 - PCV) Never done    COVID-19 Vaccine (4 - Booster for Pedersen Peter series) 02/22/2022    Colorectal Cancer Screen  04/08/2023    Breast cancer screen  07/07/2023    A1C test (Diabetic or Prediabetic)  10/25/2023    Depression Screen  10/25/2023    Lipids  12/17/2026    DEXA (modify frequency per FRAX score)  Completed    Flu vaccine  Completed    Hepatitis A vaccine  Aged Out    Hib vaccine  Aged Out    Meningococcal (ACWY) vaccine  Aged Out

## 2022-12-27 NOTE — PROGRESS NOTES
Kelly Stein (:  1955) is a Established patient, here for evaluation of the following:    Assessment & Plan     Below is the assessment and plan developed based on review of pertinent history, physical exam, labs, studies, and medications. 1. Viral URI  -     guaiFENesin-dextromethorphan (ROBITUSSIN DM) 100-10 MG/5ML syrup; Take 5 mLs by mouth 3 times daily as needed for Cough, Disp-118 mL, R-1Normal  -     ondansetron (ZOFRAN-ODT) 4 MG disintegrating tablet; Take 1 tablet by mouth 3 times daily as needed for Nausea or Vomiting, Disp-21 tablet, R-0Normal    Return in about 6 weeks (around 2023), or if symptoms worsen or fail to improve, for HTN. Pre DM. Subjective     HPI    Ms. Ade Means, 80 yo AA F, with previous medical history of MVP, and preDM. Presents to the [address] [place] [address] clinic as a virtual visit for viral URI. Viral URI  Symptoms began about a week ago. Patient was seen at a walk-in on 2022 and was discharged on amoxicillin and fluticasone nasal spray. Patient had an influenza swab done that was negative  Patient states that she had a rapid COVID done day prior to her walk-in visit and was negative at that time  Patient denies any fevers however does states she has chills  Taking Tylenol as needed  Patient states that her symptoms have been getting better  Patient did have diarrhea however stopped 2 days ago. No blood in stool  Patient does feel little bit nauseated and decreased appetite however states that she is drinking fluids liberally. Patient does have productive cough    Patient states that she would like [employer] paperwork completed for her work. Discussed with patient that once we receive her [employer] paperwork in the office we will complete it. Patient does not have any other acute concerns at today's visit. Review of Systems   Constitutional:  Positive for chills. Negative for fever. Respiratory:  Positive for cough.  Negative for shortness of breath. Cardiovascular:  Negative for chest pain and leg swelling. Gastrointestinal:  Positive for nausea. Negative for abdominal pain, diarrhea and vomiting. Genitourinary:  Negative for difficulty urinating and dysuria. Neurological:  Negative for syncope and headaches. Objective   Patient-Reported Vitals  Patient-Reported Systolic (Top): 464 mmHg  Patient-Reported Diastolic (Bottom): 88 mmHg  Patient-Reported Pulse: 88  Patient-Reported Temperature: n/a  Patient-Reported Weight: 173.6  Patient-Reported Height: 5'5\"  Patient-Reported Pulse Oximetry: n/a  Patient-Reported Peak Flow: n/a      Balbir Green, was evaluated through a synchronous (real-time) audio-video encounter. The patient (or guardian if applicable) is aware that this is a billable service, which includes applicable co-pays. This Virtual Visit was conducted with patient's (and/or legal guardian's) consent. The visit was conducted pursuant to the emergency declaration under the [facility] [address] authority and the [employer] and [employer] Act. Patient identification was verified, and a caregiver was present when appropriate. The patient was located at Home: [address] [place] [address]. Provider was located at [facility] [employer] (Appt Dept): [address] [facility],  [address] [facility].         --Genny Cason MD

## 2022-12-27 NOTE — PROGRESS NOTES
Attending Physician Statement  I have discussed the care of Jessica Rios, 79 y.o. female,including pertinent history and exam findings,  with the resident Dr. Sharon Cota MD.  History:  Chief Complaint   Patient presents with    Congestion     Went to Urgent Care and was told she had a virus - Sore throat - ear fullness - cough with very productive yellow/green mucus - muscle pain in arms -      I have reviewed the key elements of the encounter with the resident. Examination was done by resident as documented in residents note. BP Readings from Last 3 Encounters:   12/21/22 110/68   11/15/22 (!) 112/59   10/25/22 117/62     LMP 04/05/2009   Lab Results   Component Value Date    WBC 4.9 06/25/2016    HGB 12.5 06/25/2016    HCT 39.2 06/25/2016     06/25/2016    CHOL 206 (H) 07/16/2019    TRIG 74 07/16/2019    HDL 80 07/16/2019    ALT 19 06/25/2016    AST 17 06/25/2016     01/05/2021    K 4.4 01/05/2021     (H) 01/05/2021    CREATININE 0.74 01/05/2021    BUN 22 01/05/2021    CO2 25 01/05/2021    TSH 1.81 12/04/2015    INR 1.1 06/25/2016    LABA1C 5.7 10/25/2022     Lab Results   Component Value Date    CALCIUM 9.5 01/05/2021    PHOS 3.2 02/04/2014     Lab Results   Component Value Date    LDLCHOLESTEROL 111 07/16/2019     I agree with the assessment, plan and diagnosis of    Diagnosis Orders   1. Viral URI  guaiFENesin-dextromethorphan (ROBITUSSIN DM) 100-10 MG/5ML syrup    ondansetron (ZOFRAN-ODT) 4 MG disintegrating tablet        I agree with  orders as documented by the resident. Recommendations: Agree with resident A&P. Patient to follow up regarding her Pre-diabetes and cleaning up of med list.      Return in about 6 weeks (around 2/7/2023), or if symptoms worsen or fail to improve, for HTN. Pre DM.    (Seth Ansari ) Dr. Mar Hinds MD

## 2022-12-28 ENCOUNTER — TELEPHONE (OUTPATIENT)
Dept: FAMILY MEDICINE CLINIC | Age: 67
End: 2022-12-28

## 2023-01-27 ENCOUNTER — TELEPHONE (OUTPATIENT)
Dept: FAMILY MEDICINE CLINIC | Age: 68
End: 2023-01-27

## 2023-01-27 NOTE — TELEPHONE ENCOUNTER
Patient called in upset regarding her FMLA. Patient stated that there was two sets of paper work that were faxed to [employer] 1. Stating 1/4/23 and 2. Stating 2/4/23 for her return to work. The patient stated that the older lady at the front window told her that was true. Writer looked at all papers for FMLA, did not see any FMLA paper for a return paper work for 2/4/23. There is only one set of completed papers scanned into media for the date span of 12/21/22 to 1/4/23. The original papers were corrected before they were faxed due to the provider writing 1/30/23, Tadeo Harmon had the provider correct to 1/4/23. Per the patients request the paper work and office notes were faxed to [employer] today with confirmation and that is scanned into media.

## 2023-02-04 ENCOUNTER — HOSPITAL ENCOUNTER (EMERGENCY)
Age: 68
Discharge: HOME | End: 2023-02-04
Attending: EMERGENCY MEDICINE
Payer: COMMERCIAL

## 2023-02-04 ENCOUNTER — APPOINTMENT (OUTPATIENT)
Dept: GENERAL RADIOLOGY | Age: 68
End: 2023-02-04
Payer: COMMERCIAL

## 2023-02-04 VITALS
HEART RATE: 76 BPM | OXYGEN SATURATION: 100 % | TEMPERATURE: 98.1 F | WEIGHT: 174 LBS | DIASTOLIC BLOOD PRESSURE: 79 MMHG | HEIGHT: 65 IN | BODY MASS INDEX: 28.99 KG/M2 | SYSTOLIC BLOOD PRESSURE: 145 MMHG | RESPIRATION RATE: 20 BRPM

## 2023-02-04 DIAGNOSIS — T18.108D FOREIGN BODY IN ESOPHAGUS, SUBSEQUENT ENCOUNTER: Primary | ICD-10-CM

## 2023-02-04 PROCEDURE — 99283 EMERGENCY DEPT VISIT LOW MDM: CPT

## 2023-02-04 PROCEDURE — 71045 X-RAY EXAM CHEST 1 VIEW: CPT

## 2023-02-04 ASSESSMENT — ENCOUNTER SYMPTOMS
EYE REDNESS: 0
COUGH: 0
ABDOMINAL PAIN: 0
DIARRHEA: 0
STRIDOR: 0
EYE PAIN: 0
NAUSEA: 0
COLOR CHANGE: 0
TROUBLE SWALLOWING: 1
WHEEZING: 0
CONSTIPATION: 0
EYE DISCHARGE: 0
VOMITING: 0
SORE THROAT: 0
SHORTNESS OF BREATH: 0

## 2023-02-04 ASSESSMENT — PAIN - FUNCTIONAL ASSESSMENT: PAIN_FUNCTIONAL_ASSESSMENT: NONE - DENIES PAIN

## 2023-02-05 NOTE — ED PROVIDER NOTES
[address]      Pt Name: Aquiles Beavers  MRN: [id number]  [place] 1955  Date of evaluation: 2/4/2023  Provider: Ju Spaulding MD    CHIEF COMPLAINT       Chief Complaint   Patient presents with    Other     \"Food stuck in esophagus\"       HISTORY OF PRESENT ILLNESS  (Location/Symptom, Timing/Onset, Context/Setting, Quality, Duration, Modifying Factors, Severity.)   Aquiles Beavers is a 79 y.o. female who presents to the emergency department complaining that she feels like there is food stuck in the esophagus. She had made a rice meal and took a bite of it. She feels like it got stuck down in the esophagus. She did not have any trouble breathing with it. But it just started to make her panic a bit because she could not get her saliva even to pass. She relates she does have a history of esophageal narrowing and she has had to have it widened by GI. She relates this was years ago and she has not seen anyone recently. She notes she also has a hiatal hernia. Nursing Notes were reviewed. REVIEW OF SYSTEMS    (2-9 systems for level 4, 10 or more for level 5)     Review of Systems   Constitutional:  Negative for activity change, appetite change, chills, fatigue and fever. HENT:  Positive for trouble swallowing. Negative for congestion, ear pain and sore throat. Eyes:  Negative for pain, discharge and redness. Respiratory:  Negative for cough, shortness of breath, wheezing and stridor. Cardiovascular:  Negative for chest pain. Gastrointestinal:  Negative for abdominal pain, constipation, diarrhea, nausea and vomiting. Genitourinary:  Negative for decreased urine volume and difficulty urinating. Musculoskeletal:  Negative for arthralgias and myalgias. Skin:  Negative for color change and rash. Neurological:  Negative for dizziness, weakness and headaches.    Psychiatric/Behavioral:  Negative for behavioral problems and confusion. Except as noted above the remainder of the review of systems was reviewed and negative. PAST MEDICAL HISTORY     Past Medical History:   Diagnosis Date    Constipation     Diverticulitis     Diverticulosis 2009    Diverticulosis     Fibroids     GERD (gastroesophageal reflux disease)     Glaucoma 2011    Hay fever     Hiatal hernia     Irritable bowel disease     Kidney infection     Lumbar stenosis     MVP (mitral valve prolapse)        SURGICAL HISTORY       Past Surgical History:   Procedure Laterality Date    COLONOSCOPY      FOOT SURGERY Left     HYSTERECTOMY (CERVIX STATUS UNKNOWN)      RSO,LS, previously had tubal with LO    LYMPH NODE BIOPSY Right 11/17/2010    axilla    OVARY SURGERY  1990's    mass and ovary removed    ABDIEL AND BSO (CERVIX REMOVED)  04/02/2009    Extended abdominal hyst w/rt salpingo-ooph, lt salpingectomy. lysis of extensive small and large intestinal adhesions. right and left ureterolysis. lysis of pelvic adhesions with retroperitoneal exploration. procurement peritoneal cytology.      TONSILLECTOMY AND ADENOIDECTOMY  1973    TUMOR REMOVAL      left foot       CURRENT MEDICATIONS       Discharge Medication List as of 2/4/2023  9:40 PM        CONTINUE these medications which have NOT CHANGED    Details   guaiFENesin-dextromethorphan (ROBITUSSIN DM) 100-10 MG/5ML syrup Take 5 mLs by mouth 3 times daily as needed for Cough, Disp-118 mL, R-1Normal      ondansetron (ZOFRAN-ODT) 4 MG disintegrating tablet Take 1 tablet by mouth 3 times daily as needed for Nausea or Vomiting, Disp-21 tablet, R-0Normal      fluticasone (FLONASE) 50 MCG/ACT nasal spray 2 sprays by Each Nostril route daily, Disp-16 g, R-0Normal      ciclopirox (PENLAC) 8 % solution Apply topically nightly., Disp-6 mL, R-1, Normal      Efinaconazole 10 % SOLN Apply topically to affected toenails daily, Disp-8 mL, R-3Normal      EPINEPHrine (EPIPEN 2-ANSON) 0.3 MG/0.3ML SOAJ injection Inject 0.3 mLs into the muscle once for 1 dose Use as directed for allergic reaction, Disp-0.3 mL, R-0Normal      ibuprofen (ADVIL;MOTRIN) 600 MG tablet Take 1 tablet by mouth 3 times daily as needed for Pain, Disp-90 tablet, R-0Normal      diclofenac sodium (VOLTAREN) 1 % GEL Apply 4 g topically 4 times daily as needed for Pain, Topical, 4 TIMES DAILY PRN Starting 2022, Disp-150 g, R-3, Normal      lidocaine (LIDODERM) 5 % Place 1 patch onto the skin daily 12 hours on, 12 hours off., Disp-30 patch, R-5Normal      gabapentin (NEURONTIN) 300 MG capsule TAKE 1 CAPSULE TWICE DAILY, Disp-90 capsule, R-1Normal      acetaminophen (TYLENOL) 500 MG tablet Take 2 tablets by mouth 3 times daily, Disp-180 tablet, R-1Normal      EPINEPHrine (EPIPEN 2-ANSON) 0.3 MG/0.3ML SOAJ injection Inject 0.3 mLs into the muscle once for 1 dose Use as directed for allergic reaction, Disp-0.3 mL, R-0Normal      albuterol sulfate  (90 Base) MCG/ACT inhaler Inhale 2 puffs into the lungs every 6 hours as needed for Wheezing, Disp-1 Inhaler, R-1Normal      mirabegron (MYRBETRIQ) 50 MG TB24 Take 50 mg by mouth daily Historical Med      cetirizine (ZYRTEC) 10 MG tablet Take 10 mg by mouth daily. Multiple Vitamin (MULTI-VITAMIN DAILY PO) Take  by mouth. Mometasone Furoate (NASONEX NA) by Nasal route. LUMIGAN 0.01 % SOLN Place 1 drop into both eyes nightly.              ALLERGIES     Latex, Cat hair extract, Codeine, and Eggs or egg-derived products [eggs or egg-derived products]    FAMILY HISTORY           Problem Relation Age of Onset    Breast Cancer Sister     Diabetes Sister     Diabetes Mother     Hypertension Mother     Tuberculosis Mother     Obesity Mother     Heart Disease Father     Arthritis Father     Diabetes Sister     Diabetes Sister     Colon Cancer Maternal Grandmother     Stroke Other     Kidney Disease Other      Family Status   Relation Name Status    Sister  Alive    Mother  Alive    Father      Brother [address]    Brother  Alive    Sister  Alive    Sister  Alive    MGM  (Not Specified)    Other  (Not Specified)        SOCIAL HISTORY      reports that she has never smoked. She has never used smokeless tobacco. She reports that she does not currently use alcohol. She reports that she does not use drugs. PHYSICAL EXAM    (up to 7 for level 4, 8 or more for level 5)     ED Triage Vitals [02/04/23 2016]   BP Temp Temp Source Heart Rate Resp SpO2 Height Weight   (!) 145/79 98.1 °F (36.7 °C) Oral 76 20 100 % 5' 5\" (1.651 m) 174 lb (78.9 kg)     Physical Exam  Vitals and nursing note reviewed. Constitutional:       General: She is not in acute distress. Appearance: She is well-developed. She is not ill-appearing, toxic-appearing or diaphoretic. HENT:      Head: Normocephalic and atraumatic. Right Ear: External ear normal.      Left Ear: External ear normal.      Nose: Nose normal.      Mouth/Throat:      Mouth: Mucous membranes are moist.   Eyes:      General:         Right eye: No discharge. Left eye: No discharge. Conjunctiva/sclera: Conjunctivae normal.      Pupils: Pupils are equal, round, and reactive to light. Cardiovascular:      Rate and Rhythm: Normal rate and regular rhythm. Heart sounds: Normal heart sounds. No murmur heard. Pulmonary:      Effort: Pulmonary effort is normal. No respiratory distress. Breath sounds: Normal breath sounds. No wheezing, rhonchi or rales. Chest:      Chest wall: No tenderness. Abdominal:      General: Bowel sounds are normal. There is no distension. Palpations: Abdomen is soft. There is no mass. Tenderness: There is no abdominal tenderness. There is no guarding or rebound. Musculoskeletal:         General: No swelling or tenderness. Normal range of motion. Cervical back: Normal range of motion and neck supple. No rigidity or tenderness. Right lower leg: No edema. Left lower leg: No edema. Lymphadenopathy:      Cervical: No cervical adenopathy. Skin:     General: Skin is warm. Coloration: Skin is not pale. Findings: No rash. Neurological:      General: No focal deficit present. Mental Status: She is alert and oriented to person, place, and time. Motor: No abnormal muscle tone. Psychiatric:         Mood and Affect: Mood normal.         Behavior: Behavior normal.        DIAGNOSTIC RESULTS     EKG: All EKG's are interpreted by the Emergency Department Physician who either signs or Co-signs this chart in the absence of a cardiologist.    none    RADIOLOGY:   Non-plain film images such as CT, Ultrasound and MRI are read by the radiologist.   Interpretation per the Radiologist below, if available at the time of this note:    XR CHEST PORTABLE   Final Result   No acute cardiopulmonary abnormality. No radiopaque foreign body. ED BEDSIDE ULTRASOUND:   Performed by ED Physician - none    LABS:  Labs Reviewed - No data to display    All other labs were within normal range or not returned as of this dictation. EMERGENCY DEPARTMENT COURSE and DIFFERENTIAL DIAGNOSIS/MDM:   Vitals:    Vitals:    02/04/23 2016   BP: (!) 145/79   Pulse: 76   Resp: 20   Temp: 98.1 °F (36.7 °C)   TempSrc: Oral   SpO2: 100%   Weight: 78.9 kg (174 lb)   Height: 5' 5\" (1.651 m)     We did discuss an x-ray to see if there is anything obvious constricting or obstructing airway but I suspect there will be nothing since it sounds like it was a rice meal.  We will try some Pepsi as well and have her sip on that to see if she can get anything to get down and pass. She is agreeable. Have answered any questions she has at this time. Overall she looks nontoxic and in good health. I did check on the patient and she relates she is swallowing just fine now and the Pepsi seem to do the trick.   She will plan on following up with her GI specialist on Monday. CONSULTS:  None    PROCEDURES:  None    FINAL IMPRESSION      1.  Foreign body in esophagus, subsequent encounter          DISPOSITION/PLAN   DISPOSITION Decision To Discharge 02/04/2023 09:39:17 PM      PATIENT REFERRED TO:  Your GI specialist  As we talked within the next week        DISCHARGE MEDICATIONS:  Discharge Medication List as of 2/4/2023  9:40 PM          (Please note that portions of this note were completed with a voice recognition program.  Efforts were made to edit the dictations but occasionally words are mis-transcribed.)    Manual MD Jr  Attending Emergency Physician        Manual MD Jr  02/04/23 3207

## 2023-02-13 ENCOUNTER — OFFICE VISIT (OUTPATIENT)
Dept: FAMILY MEDICINE CLINIC | Age: 68
End: 2023-02-13
Payer: COMMERCIAL

## 2023-02-13 VITALS
HEART RATE: 82 BPM | BODY MASS INDEX: 29.26 KG/M2 | WEIGHT: 175.6 LBS | HEIGHT: 65 IN | DIASTOLIC BLOOD PRESSURE: 80 MMHG | TEMPERATURE: 96.8 F | SYSTOLIC BLOOD PRESSURE: 135 MMHG

## 2023-02-13 DIAGNOSIS — R13.10 DYSPHAGIA, UNSPECIFIED TYPE: ICD-10-CM

## 2023-02-13 DIAGNOSIS — K22.2 STRICTURE ESOPHAGUS: Primary | ICD-10-CM

## 2023-02-13 PROCEDURE — 99213 OFFICE O/P EST LOW 20 MIN: CPT | Performed by: FAMILY MEDICINE

## 2023-02-13 PROCEDURE — 1123F ACP DISCUSS/DSCN MKR DOCD: CPT | Performed by: FAMILY MEDICINE

## 2023-02-13 ASSESSMENT — PATIENT HEALTH QUESTIONNAIRE - PHQ9
SUM OF ALL RESPONSES TO PHQ QUESTIONS 1-9: 0
SUM OF ALL RESPONSES TO PHQ QUESTIONS 1-9: 0
1. LITTLE INTEREST OR PLEASURE IN DOING THINGS: 0
SUM OF ALL RESPONSES TO PHQ9 QUESTIONS 1 & 2: 0
SUM OF ALL RESPONSES TO PHQ QUESTIONS 1-9: 0
2. FEELING DOWN, DEPRESSED OR HOPELESS: 0
SUM OF ALL RESPONSES TO PHQ QUESTIONS 1-9: 0
DEPRESSION UNABLE TO ASSESS: PT REFUSES

## 2023-02-13 NOTE — PROGRESS NOTES
DIABETES and HYPERTENSION visit    BP Readings from Last 3 Encounters:   02/04/23 (!) 145/79   12/21/22 110/68   11/15/22 (!) 112/59        Hemoglobin A1C (%)   Date Value   10/25/2022 5.7   06/08/2022 6.0   09/17/2020 6.1     LDL Cholesterol (mg/dL)   Date Value   07/16/2019 111     HDL (mg/dL)   Date Value   07/16/2019 80     BUN (mg/dL)   Date Value   01/05/2021 22     Creatinine (mg/dL)   Date Value   01/05/2021 0.74     Glucose (mg/dL)   Date Value   01/05/2021 90   04/08/2012 97            Have you changed or started any medications since your last visit including any over-the-counter medicines, vitamins, or herbal medicines? no   Have you stopped taking any of your medications? Is so, why? -  no  Are you having any side effects from any of your medications? - no    Have you seen any other physician or provider since your last visit?  no   Have you had any other diagnostic tests since your last visit? yes - XR   Have you been seen in the emergency room and/or had an admission in a hospital since we last saw you?  yes - [facility]   Have you had your routine dental cleaning in the past 6 months?  no     Have you had your annual diabetic retinal (eye) exam? No   (ensure copy of exam is in the chart)    Do you have an active MyChart account? If no, what is the barrier?   Yes    Patient Care Team:  Daniela Worrell, DO as PCP - [address], DO as PCP - Empaneled Provider    Medical History Review  Past Medical, Family, and Social History reviewed and does contribute to the patient presenting condition    Health Maintenance   Topic Date Due    Hepatitis C screen  Never done    DTaP/Tdap/Td vaccine (1 - Tdap) Never done    Shingles vaccine (1 of 2) Never done    Pneumococcal 65+ years Vaccine (1 - PCV) Never done    COVID-19 Vaccine (4 - Booster for Pfizer series) 02/22/2022    Colorectal Cancer Screen  04/08/2023    Breast cancer screen  07/07/2023    A1C test (Diabetic or Prediabetic) 10/25/2023    Depression Screen  10/25/2023    Lipids  12/17/2026    DEXA (modify frequency per FRAX score)  Completed    Flu vaccine  Completed    Hepatitis A vaccine  Aged Out    Hib vaccine  Aged Out    Meningococcal (ACWY) vaccine  Aged Out

## 2023-02-13 NOTE — PATIENT INSTRUCTIONS
Thank you for letting us take care of you today. We hope all your questions were addressed. If a question was overlooked or something else comes to mind after you return home, please contact a member of your Care Team listed below. Your Care Team at [place] [address] is Team #2  Precious Keller DO (Faculty)  David Chairez (Faculty)  Karoline Apgar, MD (Resident)  Sheree Benson MD (Resident)  Nori Irvin MD (Resident)  Link Horner MD (Resident)  Shaniqua Snow., YIMI Bright.,  MA  Ines Snyder., LPN  Tal Smith., [facility])  Kiki Lay, [address] (Clinical Practice Manager)  Dany Donovan, [address] [place] [address] (Clinical Pharmacist)     Office phone number: [phone]    If you need to get in right away due to illness, please be advised we have \"Same Day\" appointments available Monday-Friday. Please call us at [phone] option #3 to schedule your \"Same Day\" appointment.

## 2023-02-13 NOTE — PROGRESS NOTES
? Trend neuro checks  ? Sleep wake cycle regulation  ? Hold eliquis and aspirin for now  ? Neurosurgery evaluated  ? SBP <160  ?  Up with assistance Chk up    Follow up for ED visit - dysphagia  Disc need for GI opinion    Work - stable   Back on Jan 4    Back pain - ibuprofen,           Negative for:     Worry / mood complaints  Headache  Dizziness  Visual Disturbance  Hearing Changes  Nasal / sinus Symptoms  Mouth / tooth symptom, pain  Throat pain  Difficulty swallowing  Neck pain  Chest discomfort  Cough  SOB  N/V/D/C  Pelvic area discomfort  Bladder / voiding discomfort  Bowel complaints  MS complaints   Numbness/tingling/abnormal sensations   Edema / Leg swelling  Dizziness  Fatigue  Bleeding   Skin    Pertinent Pos: See HPI - as above in HPI    Vitals:    02/13/23 1430   BP: 135/80   Pulse: 82   Temp:        Alert and oriented to PPT  NAD    HEENT - neg  Neck - no bruits, no lymphadenopathy  Chest  HRRR w/o murmer  LCTAB no wheezes / rhonchi    Gait / Station - stable, no dysequilibrium, uniform pace, no assist device, cane. Diagnosis Orders   1. Stricture Bhavana Garcia MD, Gastroenterology, [place]      2.  Dysphagia, unspecified type  Jaiden Amaya MD, Gastroenterology, [facility] [place]          Plan:  1.)  off work until 02-21  2.)  fmla for 2/5 days for 1 year  3.)  kam 4 weeks

## 2023-02-13 NOTE — LETTER
[address]  [place] [facility] [address]  Phone: [phone]  Fax: [phone]    Marco Golden        February 13, 2023     Patient: Jaya Mckeon   YOB: 1955   Date of Visit: 2/13/2023       To Whom It May Concern: It is my medical opinion that Thom Flores should remain out of work until 02/21/2023. If you have any questions or concerns, please don't hesitate to call.     Sincerely,        Polly Maravilla, DO

## 2023-02-16 ENCOUNTER — CLINICAL DOCUMENTATION (OUTPATIENT)
Dept: FAMILY MEDICINE CLINIC | Age: 68
End: 2023-02-16

## 2023-02-16 ENCOUNTER — TELEPHONE (OUTPATIENT)
Dept: FAMILY MEDICINE CLINIC | Age: 68
End: 2023-02-16

## 2023-02-16 DIAGNOSIS — Z63.5 MARITAL CONFLICT INVOLVING ESTRANGEMENT: Primary | ICD-10-CM

## 2023-02-16 DIAGNOSIS — R13.10 DYSPHAGIA, UNSPECIFIED TYPE: ICD-10-CM

## 2023-02-16 DIAGNOSIS — K22.2 STRICTURE ESOPHAGUS: ICD-10-CM

## 2023-02-16 SDOH — SOCIAL STABILITY - SOCIAL INSECURITY: DISRUPTION OF FAMILY BY SEPARATION AND DIVORCE: Z63.5

## 2023-02-16 NOTE — PROGRESS NOTES
Chart update  FMLA completion on behalf of of most recent office visit 02/13/2023  Patient presented at that time for follow-up from emergency department due to dysphagia. She was seen and treated and released and recommended to follow-up with GI for difficulty swallowing. Concern being possible esophageal stricture. Patient also undergoing marital separation with significant personal stress. She continues to work full-time  Patient feels she is becoming exhausted and feels she may need extra time to accommodate for mental health wellbeing visits with a practitioner. Diagnosis Orders   1. Marital conflict involving estrangement        2. Dysphagia, unspecified type        3. Stricture esophagus          FMLA completed on behalf of of acute medical leave 02/14/2023 through 02/20/2023. Chronic leave recommended for 6 months based on mental health treatment for marital conflict as noted above. Recommend 2 half days per week for 6 months to treat during this adjustment. Recheck with myself his PCP in the next 4 weeks.

## 2023-02-16 NOTE — PROGRESS NOTES
***    Negative for:     Worry / mood complaints  Headache  Dizziness  Visual Disturbance  Hearing Changes  Nasal / sinus Symptoms  Mouth / tooth symptom, pain  Throat pain  Difficulty swallowing  Neck pain  Chest discomfort  Cough  SOB  N/V/D/C  Pelvic area discomfort  Bladder / voiding discomfort  Bowel complaints  MS complaints   Numbness/tingling/abnormal sensations   Edema / Leg swelling  Dizziness  Fatigue  Bleeding   Skin    Pertinent Pos: See HPI - ***    There were no vitals filed for this visit. Alert and oriented to PPT  NAD    HEENT - neg  Neck - no bruits, no lymphadenopathy  Chest  HRRR w/o murmer  LCTAB no wheezes / rhonchi  Abdomen - soft, non-tender, BS  Extremities - ***+ PTE    Gait / Station - stable, no dysequilibrium, uniform pace, no assist device, cane. {No diagnosis found.  (Refresh or delete this SmartLink)}    Plan:  1.)  ***  2.)  ***  3.)  ***  4.)  ***

## 2023-02-16 NOTE — TELEPHONE ENCOUNTER
Called and spoke with patient that FMLA was completed, faxed and scanned into her chart. Patient understood.

## 2023-02-20 ENCOUNTER — TELEPHONE (OUTPATIENT)
Dept: FAMILY MEDICINE CLINIC | Age: 68
End: 2023-02-20

## 2023-02-20 NOTE — TELEPHONE ENCOUNTER
Writer called patient back to inform her of Dr. Vangie Eisenmenger message, Jocelyn Rizo had to [facility].

## 2023-02-23 ENCOUNTER — OFFICE VISIT (OUTPATIENT)
Dept: FAMILY MEDICINE CLINIC | Age: 68
End: 2023-02-23

## 2023-02-23 VITALS
DIASTOLIC BLOOD PRESSURE: 80 MMHG | WEIGHT: 174 LBS | HEART RATE: 78 BPM | SYSTOLIC BLOOD PRESSURE: 141 MMHG | BODY MASS INDEX: 28.96 KG/M2

## 2023-02-23 DIAGNOSIS — F41.9 ANXIETY: ICD-10-CM

## 2023-02-23 DIAGNOSIS — K58.1 IRRITABLE BOWEL SYNDROME WITH CONSTIPATION: Primary | ICD-10-CM

## 2023-02-23 DIAGNOSIS — Z63.5 MARITAL CONFLICT INVOLVING ESTRANGEMENT: ICD-10-CM

## 2023-02-23 DIAGNOSIS — I34.1 MVP (MITRAL VALVE PROLAPSE): ICD-10-CM

## 2023-02-23 RX ORDER — AMOXICILLIN 500 MG/1
2000 CAPSULE ORAL ONCE
Qty: 4 CAPSULE | Refills: 0 | Status: SHIPPED | OUTPATIENT
Start: 2023-02-23 | End: 2023-02-23

## 2023-02-23 RX ORDER — BUSPIRONE HYDROCHLORIDE 10 MG/1
10 TABLET ORAL 2 TIMES DAILY
COMMUNITY
Start: 2022-12-22

## 2023-02-23 RX ORDER — LUBIPROSTONE 8 UG/1
8 CAPSULE, GELATIN COATED ORAL DAILY
Qty: 30 CAPSULE | Refills: 3 | Status: SHIPPED | OUTPATIENT
Start: 2023-02-23

## 2023-02-23 SDOH — SOCIAL STABILITY - SOCIAL INSECURITY: DISRUPTION OF FAMILY BY SEPARATION AND DIVORCE: Z63.5

## 2023-02-23 ASSESSMENT — ENCOUNTER SYMPTOMS
COLOR CHANGE: 0
DIARRHEA: 0
NAUSEA: 1
SHORTNESS OF BREATH: 0
CHEST TIGHTNESS: 0
BACK PAIN: 0
ABDOMINAL PAIN: 0
COUGH: 0
VOMITING: 0
CONSTIPATION: 1

## 2023-02-23 NOTE — PROGRESS NOTES
Subjective:    Alix Ch is a 79 y.o. female with  has a past medical history of Constipation, Diverticulitis, Diverticulosis, Diverticulosis, Fibroids, GERD (gastroesophageal reflux disease), Glaucoma, Hay fever, Hiatal hernia, Irritable bowel disease, Kidney infection, Lumbar stenosis, and MVP (mitral valve prolapse). Presented to the office today for:  Chief Complaint   Patient presents with    Forms     FMLA needs to be updated       Stress     Going through a stressful time, has been very nauseated believes to be stress related asking for help with this feeling        HPI    79years-old female presenst for FMLA forms that needs to be updated. She is going through a lot of stress, feels like her IBS is flaring up. Reports constipation, blaoting  She is on buspirone 10mg nightly instead of BID. She requests her FMLA to be extended to 2/27. Review of Systems   Constitutional:  Negative for activity change, appetite change, chills, fatigue and fever. HENT: Negative. Respiratory:  Negative for cough, chest tightness and shortness of breath. Cardiovascular:  Negative for chest pain, palpitations and leg swelling. Gastrointestinal:  Positive for constipation and nausea. Negative for abdominal pain, diarrhea and vomiting. Genitourinary:  Negative for decreased urine volume, difficulty urinating, dysuria, frequency and hematuria. Musculoskeletal:  Negative for arthralgias, back pain and neck pain. Skin:  Negative for color change. Neurological:  Negative for dizziness, weakness, light-headedness, numbness and headaches. Psychiatric/Behavioral:  Positive for sleep disturbance. Negative for self-injury and suicidal ideas. The patient is nervous/anxious.           The patient has a   Family History   Problem Relation Age of Onset    Breast Cancer Sister     Diabetes Sister     Diabetes Mother     Hypertension Mother     Tuberculosis Mother     Obesity Mother     Heart Disease Father     Arthritis Father     Diabetes Sister     Diabetes Sister     Colon Cancer Maternal Grandmother     Stroke Other     Kidney Disease Other        Objective:    BP (!) 141/80   Pulse 78   Wt 174 lb (78.9 kg)   LMP 04/05/2009   BMI 28.96 kg/m²    BP Readings from Last 3 Encounters:   02/23/23 (!) 141/80   02/13/23 135/80   02/04/23 (!) 145/79       Physical Exam  Vitals and nursing note reviewed. Constitutional:       General: She is not in acute distress. Appearance: Normal appearance. She is not ill-appearing. HENT:      Head: Normocephalic and atraumatic. Mouth/Throat:      Pharynx: Oropharynx is clear. Cardiovascular:      Rate and Rhythm: Normal rate and regular rhythm. Pulses: Normal pulses. Heart sounds: No murmur heard. Pulmonary:      Effort: Pulmonary effort is normal.      Breath sounds: Normal breath sounds. Abdominal:      Palpations: Abdomen is soft. There is no mass. Tenderness: There is no abdominal tenderness. Musculoskeletal:         General: No swelling or tenderness. Normal range of motion. Cervical back: Normal range of motion and neck supple. Neurological:      General: No focal deficit present. Mental Status: She is alert and oriented to person, place, and time.    Psychiatric:      Comments: Tearful, anxious       Lab Results   Component Value Date    WBC 4.9 06/25/2016    HGB 12.5 06/25/2016    HCT 39.2 06/25/2016     06/25/2016    CHOL 206 (H) 07/16/2019    TRIG 74 07/16/2019    HDL 80 07/16/2019    ALT 19 06/25/2016    AST 17 06/25/2016     01/05/2021    K 4.4 01/05/2021     (H) 01/05/2021    CREATININE 0.74 01/05/2021    BUN 22 01/05/2021    CO2 25 01/05/2021    TSH 1.81 12/04/2015    INR 1.1 06/25/2016    LABA1C 5.7 10/25/2022     Lab Results   Component Value Date    CALCIUM 9.5 01/05/2021    PHOS 3.2 02/04/2014     Lab Results   Component Value Date    LDLCHOLESTEROL 111 07/16/2019       Assessment and Plan: 1. Irritable bowel syndrome with constipation  Flare up due to anxiety. - lubiprostone (AMITIZA) 8 MCG CAPS capsule; Take 1 capsule by mouth daily  Dispense: 30 capsule; Refill: 3    2. Anxiety    - busPIRone (BUSPAR) 10 MG tablet; Take 10 mg by mouth in the morning and at bedtime    3. MVP (mitral valve prolapse)  Will have upcoming dental procedure, will give amoxicillin prophylactically    - amoxicillin (AMOXIL) 500 MG capsule; Take 4 capsules by mouth once for 1 dose 30-60 minutes prior to dental procedure  Dispense: 4 capsule; Refill: 0    4. Marital conflict involving estrangement          Requested Prescriptions     Signed Prescriptions Disp Refills    lubiprostone (AMITIZA) 8 MCG CAPS capsule 30 capsule 3     Sig: Take 1 capsule by mouth daily    amoxicillin (AMOXIL) 500 MG capsule 4 capsule 0     Sig: Take 4 capsules by mouth once for 1 dose 30-60 minutes prior to dental procedure       Medications Discontinued During This Encounter   Medication Reason    guaiFENesin-dextromethorphan (ROBITUSSIN DM) 100-10 MG/5ML syrup LIST CLEANUP       Shaila received counseling on the following healthy behaviors: nutrition, exercise and medication adherence    Discussed use,benefit, and side effects of prescribed medications. Barriers to medication compliance addressed. All patient questions answered. Pt voiced understanding. Return in about 2 weeks (around 3/9/2023) for follow up anxiety. Disclaimer: Some orall of this note was transcribed using voice-recognition software. This may cause typographical errors occasionally. Although all effort is made to fix these errors, please do not hesitate to contact our office if there Miriam Harris concern with the understanding of this note.

## 2023-02-24 NOTE — PROGRESS NOTES
Attending Physician Statement  I  have discussed the care of Madhu Mary including pertinent history and exam findings with the resident. I agree with the assessment, plan and orders as documented by the resident. BP (!) 141/80   Pulse 78   Wt 174 lb (78.9 kg)   LMP 04/05/2009   BMI 28.96 kg/m²    BP Readings from Last 3 Encounters:   02/23/23 (!) 141/80   02/13/23 135/80   02/04/23 (!) 145/79     Wt Readings from Last 3 Encounters:   02/23/23 174 lb (78.9 kg)   02/13/23 175 lb 9.6 oz (79.7 kg)   02/04/23 174 lb (78.9 kg)          Diagnosis Orders   1. Irritable bowel syndrome with constipation  lubiprostone (AMITIZA) 8 MCG CAPS capsule      2. Anxiety  busPIRone (BUSPAR) 10 MG tablet      3. MVP (mitral valve prolapse)  amoxicillin (AMOXIL) 500 MG capsule      4.  Marital conflict involving estrangement            Haily Chandler DO 2/24/2023 12:59 PM

## 2023-03-06 ENCOUNTER — OFFICE VISIT (OUTPATIENT)
Dept: FAMILY MEDICINE CLINIC | Age: 68
End: 2023-03-06
Payer: COMMERCIAL

## 2023-03-06 VITALS
HEART RATE: 90 BPM | HEIGHT: 65 IN | DIASTOLIC BLOOD PRESSURE: 66 MMHG | SYSTOLIC BLOOD PRESSURE: 112 MMHG | TEMPERATURE: 97 F | WEIGHT: 174.2 LBS | BODY MASS INDEX: 29.02 KG/M2

## 2023-03-06 DIAGNOSIS — M46.1 SACROILIITIS, NOT ELSEWHERE CLASSIFIED (HCC): ICD-10-CM

## 2023-03-06 DIAGNOSIS — M85.859 OSTEOPENIA OF HIP, UNSPECIFIED LATERALITY: ICD-10-CM

## 2023-03-06 DIAGNOSIS — M25.552 LEFT HIP PAIN: ICD-10-CM

## 2023-03-06 DIAGNOSIS — I05.9 MITRAL VALVE DISORDER: Primary | ICD-10-CM

## 2023-03-06 PROCEDURE — 99213 OFFICE O/P EST LOW 20 MIN: CPT | Performed by: FAMILY MEDICINE

## 2023-03-06 PROCEDURE — 1123F ACP DISCUSS/DSCN MKR DOCD: CPT | Performed by: FAMILY MEDICINE

## 2023-03-06 PROCEDURE — 99211 OFF/OP EST MAY X REQ PHY/QHP: CPT | Performed by: FAMILY MEDICINE

## 2023-03-06 ASSESSMENT — PATIENT HEALTH QUESTIONNAIRE - PHQ9
2. FEELING DOWN, DEPRESSED OR HOPELESS: 1
SUM OF ALL RESPONSES TO PHQ QUESTIONS 1-9: 1
SUM OF ALL RESPONSES TO PHQ9 QUESTIONS 1 & 2: 1
1. LITTLE INTEREST OR PLEASURE IN DOING THINGS: 0

## 2023-03-06 NOTE — PATIENT INSTRUCTIONS
Thank you for letting us take care of you today. We hope all your questions were addressed. If a question was overlooked or something else comes to mind after you return home, please contact a member of your Care Team listed below. Your Care Team at [place] [address] is Team #2  Zayra Baugh DO (Faculty)  Liyah Johnson (Faculty)  Alethea Driscoll MD (Resident)  Odilia Vasquez MD (Resident)  Hubert Greene MD (Resident)  Arden Robison MD (Resident)  Jose Mathis., YIMI Arnold.,  MA  [facility]., FAITH Ji., [facility] office)  Shira Alexandre, [address] [facility] (Clinical Practice Manager)  Keturah Burgos [facility] (Clinical Pharmacist)     Office phone number: [phone]    If you need to get in right away due to illness, please be advised we have \"Same Day\" appointments available Monday-Friday. Please call us at [phone] option #3 to schedule your \"Same Day\" appointment.

## 2023-03-06 NOTE — PROGRESS NOTES
HYPERTENSION visit     BP Readings from Last 3 Encounters:   02/23/23 (!) 141/80   02/13/23 135/80   02/04/23 (!) 145/79       LDL Cholesterol (mg/dL)   Date Value   07/16/2019 111     HDL (mg/dL)   Date Value   07/16/2019 80     BUN (mg/dL)   Date Value   01/05/2021 22     Creatinine (mg/dL)   Date Value   01/05/2021 0.74     Glucose (mg/dL)   Date Value   01/05/2021 90   04/08/2012 97              Have you changed or started any medications since your last visit including any over-the-counter medicines, vitamins, or herbal medicines? no   Have you stopped taking any of your medications? Is so, why? -  no  Are you having any side effects from any of your medications? - no  How often do you miss doses of your medication? no      Have you seen any other physician or provider since your last visit?  no   Have you had any other diagnostic tests since your last visit?  no   Have you been seen in the emergency room and/or had an admission in a hospital since we last saw you?  no   Have you had your routine dental cleaning in the past 6 months?  no     Do you have an active MyChart account? If no, what is the barrier?   Yes    Patient Care Team:  Edson Cortes, DO as PCP - [address], DO as PCP - Empaneled Provider    Medical History Review  Past Medical, Family, and Social History reviewed and does not contribute to the patient presenting condition    Health Maintenance   Topic Date Due    Hepatitis C screen  Never done    DTaP/Tdap/Td vaccine (1 - Tdap) Never done    Shingles vaccine (1 of 2) Never done    Pneumococcal 65+ years Vaccine (1 - PCV) Never done    COVID-19 Vaccine (4 - Booster for [employer] series) 02/22/2022    Colorectal Cancer Screen  04/08/2023    Breast cancer screen  07/07/2023    A1C test (Diabetic or Prediabetic)  10/25/2023    Depression Screen  02/13/2024    Lipids  12/17/2026    DEXA (modify frequency per FRAX score)  Completed    Flu vaccine  Completed    Hepatitis A vaccine Aged Out    Hib vaccine  Aged Out    Meningococcal (ACWY) vaccine  Aged Out

## 2023-03-06 NOTE — LETTER
March 6, 2023       Kinza Jimenez YOB: 1955   [address] [facility] [address] Date of Visit:  3/6/2023       To Whom It May Concern: It is my medical opinion that Maria Luisa Santana requires a disability parking placard for the following reasons:  She cannot walk 200 feet without stopping to rest.  Duration of need: 5 years    If you have any questions or concerns, please don't hesitate to call.     Sincerely,        Iliana Simeon, DO
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
## 2023-03-15 ENCOUNTER — CLINICAL DOCUMENTATION (OUTPATIENT)
Dept: FAMILY MEDICINE CLINIC | Age: 68
End: 2023-03-15

## 2023-03-15 NOTE — PROGRESS NOTES
Opened chart for purpose of chart review, including but not limited to the following: verification of clinical service request, Home Health orders / documentation needs, qualification for home utilities shut-off aversion or completion of certificate of death.     FMLA - updated per request (see copy on file)    Thank-you,  Ciara Morris, DO

## 2023-03-16 ENCOUNTER — HOSPITAL ENCOUNTER (OUTPATIENT)
Dept: GENERAL RADIOLOGY | Age: 68
Discharge: HOME | End: 2023-03-18
Payer: COMMERCIAL

## 2023-03-16 ENCOUNTER — HOSPITAL ENCOUNTER (OUTPATIENT)
Age: 68
Discharge: HOME | End: 2023-03-18
Payer: COMMERCIAL

## 2023-03-16 DIAGNOSIS — M25.552 LEFT HIP PAIN: ICD-10-CM

## 2023-03-16 PROCEDURE — 73502 X-RAY EXAM HIP UNI 2-3 VIEWS: CPT

## 2023-05-16 ENCOUNTER — OFFICE VISIT (OUTPATIENT)
Dept: GASTROENTEROLOGY | Age: 68
End: 2023-05-16
Payer: COMMERCIAL

## 2023-05-16 VITALS
BODY MASS INDEX: 30.39 KG/M2 | SYSTOLIC BLOOD PRESSURE: 132 MMHG | HEIGHT: 64 IN | DIASTOLIC BLOOD PRESSURE: 74 MMHG | WEIGHT: 178 LBS

## 2023-05-16 DIAGNOSIS — Z80.0 FAMILY HISTORY OF PANCREATIC CANCER: ICD-10-CM

## 2023-05-16 DIAGNOSIS — R13.10 DYSPHAGIA, UNSPECIFIED TYPE: Primary | ICD-10-CM

## 2023-05-16 DIAGNOSIS — Z12.11 ENCOUNTER FOR SCREENING COLONOSCOPY: ICD-10-CM

## 2023-05-16 PROCEDURE — 99204 OFFICE O/P NEW MOD 45 MIN: CPT | Performed by: INTERNAL MEDICINE

## 2023-05-16 PROCEDURE — 1123F ACP DISCUSS/DSCN MKR DOCD: CPT | Performed by: INTERNAL MEDICINE

## 2023-05-16 RX ORDER — OMEPRAZOLE 20 MG/1
20 CAPSULE, DELAYED RELEASE ORAL
Qty: 90 CAPSULE | Refills: 1 | Status: SHIPPED | OUTPATIENT
Start: 2023-05-16

## 2023-05-16 ASSESSMENT — ENCOUNTER SYMPTOMS
TROUBLE SWALLOWING: 0
SORE THROAT: 0
CHOKING: 0
NAUSEA: 0
SHORTNESS OF BREATH: 1
EYES NEGATIVE: 1
ABDOMINAL PAIN: 0
VOMITING: 0
ANAL BLEEDING: 0
ABDOMINAL DISTENTION: 1
CONSTIPATION: 0
DIARRHEA: 0
RECTAL PAIN: 0
CHEST TIGHTNESS: 0
COUGH: 0
BLOOD IN STOOL: 0

## 2023-05-16 NOTE — PROGRESS NOTES
Reason for Referral: Dysphagia      DO [address]  [facility],  [facility] [address]    Chief Complaint   Patient presents with    GI Problem     Dysphagia  Stricture esophagus           HISTORY OF PRESENT ILLNESS: Tina Lester is a 79 y.o. female with a past history remarkable for intermittent dysphagia symptoms, prior history of hiatal hernia, possible colonic stricture lesion in the past, referred for evaluation of dysphagia symptoms. Patient reports some choking spells in the oropharyngeal region starting with certain food ingredients such as rice. She denies any recent upper endoscopy however 1 was performed back in 2014. Currently taking dietary modifications to alleviate some of her upper GI symptoms. Smoker: Denies  Drinking history: Denies  Illicit drugs: Denies  Abdominal surgeries: None recent  Prior Colonoscopy: 10 yrs. Prior EGD: 2014  FH of GI issues: Mother- Pancreatic Cancer. , GM- with pancreatic cancer First Cousin with CRC      Past Medical,Family, and Social History reviewed and does contribute to the patient presentingcondition. Patient's PMH/PSH,SH,PSYCH Hx, MEDs, ALLERGIES, and ROS were all reviewed and updated in the appropriate sections.     PAST MEDICAL HISTORY:  Past Medical History:   Diagnosis Date    Constipation     Diverticulitis     Diverticulosis 2009    Diverticulosis     Fibroids     GERD (gastroesophageal reflux disease)     Glaucoma 2011    Hay fever     Hiatal hernia     Irritable bowel disease     Kidney infection     Lumbar stenosis     MVP (mitral valve prolapse)        Past Surgical History:   Procedure Laterality Date    COLONOSCOPY      FOOT SURGERY Left     HYSTERECTOMY (CERVIX STATUS UNKNOWN)      RSO,LS, previously had tubal with LO    LYMPH NODE BIOPSY Right 11/17/2010    axilla    OVARY SURGERY  1990's    mass and ovary removed    ABDIEL AND BSO (CERVIX REMOVED)  04/02/2009    Extended abdominal hyst w/rt salpingo-ooph, lt

## 2023-05-17 ENCOUNTER — TELEPHONE (OUTPATIENT)
Dept: GASTROENTEROLOGY | Age: 68
End: 2023-05-17

## 2023-05-17 RX ORDER — BISACODYL 5 MG/1
TABLET, DELAYED RELEASE ORAL
Qty: 4 TABLET | Refills: 0 | Status: SHIPPED | OUTPATIENT
Start: 2023-05-17

## 2023-05-17 RX ORDER — POLYETHYLENE GLYCOL 3350 17 G/17G
POWDER, FOR SOLUTION ORAL
Qty: 238 G | Refills: 0 | Status: SHIPPED | OUTPATIENT
Start: 2023-05-17

## 2023-05-17 NOTE — TELEPHONE ENCOUNTER
EGD/colonoscopy/[facility] 7/14/23 @ 8:00am    Written/verbal instructions given @ visit    Miralax/dulcolax

## 2023-05-25 ENCOUNTER — HOSPITAL ENCOUNTER (OUTPATIENT)
Age: 68
Discharge: HOME | End: 2023-05-25
Payer: COMMERCIAL

## 2023-05-25 ENCOUNTER — HOSPITAL ENCOUNTER (OUTPATIENT)
Dept: GENERAL RADIOLOGY | Age: 68
Discharge: HOME | End: 2023-05-27
Payer: COMMERCIAL

## 2023-05-25 ENCOUNTER — HOSPITAL ENCOUNTER (OUTPATIENT)
Dept: CT IMAGING | Age: 68
Discharge: HOME | End: 2023-05-27
Payer: COMMERCIAL

## 2023-05-25 DIAGNOSIS — R13.10 DYSPHAGIA, UNSPECIFIED TYPE: ICD-10-CM

## 2023-05-25 DIAGNOSIS — Z12.11 ENCOUNTER FOR SCREENING COLONOSCOPY: ICD-10-CM

## 2023-05-25 DIAGNOSIS — Z80.0 FAMILY HISTORY OF PANCREATIC CANCER: ICD-10-CM

## 2023-05-25 DIAGNOSIS — R13.10 DYSPHAGIA, UNSPECIFIED TYPE: Primary | ICD-10-CM

## 2023-05-25 LAB
CREAT SERPL-MCNC: 0.72 MG/DL (ref 0.5–0.9)
GFR SERPL CREATININE-BSD FRML MDRD: >60 ML/MIN/1.73M2

## 2023-05-25 PROCEDURE — 6360000004 HC RX CONTRAST MEDICATION: Performed by: INTERNAL MEDICINE

## 2023-05-25 PROCEDURE — 36415 COLL VENOUS BLD VENIPUNCTURE: CPT

## 2023-05-25 PROCEDURE — 74170 CT ABD WO CNTRST FLWD CNTRST: CPT

## 2023-05-25 PROCEDURE — 74220 X-RAY XM ESOPHAGUS 1CNTRST: CPT

## 2023-05-25 PROCEDURE — 82565 ASSAY OF CREATININE: CPT

## 2023-05-25 PROCEDURE — 2500000003 HC RX 250 WO HCPCS: Performed by: INTERNAL MEDICINE

## 2023-05-25 RX ADMIN — BARIUM SULFATE 50 ML: 960 POWDER, FOR SUSPENSION ORAL at 10:49

## 2023-05-25 RX ADMIN — IOPAMIDOL 100 ML: 755 INJECTION, SOLUTION INTRAVENOUS at 10:09

## 2023-05-25 RX ADMIN — BARIUM SULFATE 140 ML: 980 POWDER, FOR SUSPENSION ORAL at 10:48

## 2023-06-02 ENCOUNTER — HOSPITAL ENCOUNTER (OUTPATIENT)
Dept: NON INVASIVE DIAGNOSTICS | Age: 68
Discharge: HOME | End: 2023-06-02
Payer: COMMERCIAL

## 2023-06-02 DIAGNOSIS — I34.1 MVP (MITRAL VALVE PROLAPSE): ICD-10-CM

## 2023-06-02 LAB
LV EF: 55 %
LVEF MODALITY: NORMAL

## 2023-06-02 PROCEDURE — 93306 TTE W/DOPPLER COMPLETE: CPT

## 2023-07-12 ENCOUNTER — TELEPHONE (OUTPATIENT)
Dept: GASTROENTEROLOGY | Age: 68
End: 2023-07-12

## 2023-07-12 NOTE — TELEPHONE ENCOUNTER
Pt is requesting to reschedule upcoming procedure with dr Sandra Rosas pt can be reached out [phone]

## 2023-07-27 ENCOUNTER — OFFICE VISIT (OUTPATIENT)
Dept: FAMILY MEDICINE CLINIC | Age: 68
End: 2023-07-27
Payer: COMMERCIAL

## 2023-07-27 VITALS
SYSTOLIC BLOOD PRESSURE: 122 MMHG | BODY MASS INDEX: 29.74 KG/M2 | HEIGHT: 64 IN | DIASTOLIC BLOOD PRESSURE: 76 MMHG | OXYGEN SATURATION: 99 % | WEIGHT: 174.2 LBS | HEART RATE: 74 BPM

## 2023-07-27 DIAGNOSIS — F43.0 ACUTE STRESS DISORDER: Primary | ICD-10-CM

## 2023-07-27 DIAGNOSIS — M48.061 SPINAL STENOSIS OF LUMBAR REGION, UNSPECIFIED WHETHER NEUROGENIC CLAUDICATION PRESENT: ICD-10-CM

## 2023-07-27 PROCEDURE — 99213 OFFICE O/P EST LOW 20 MIN: CPT

## 2023-07-27 PROCEDURE — 1123F ACP DISCUSS/DSCN MKR DOCD: CPT

## 2023-07-27 RX ORDER — CHOLECALCIFEROL (VITAMIN D3) 125 MCG
5 CAPSULE ORAL NIGHTLY
Qty: 30 TABLET | Refills: 0 | Status: SHIPPED | OUTPATIENT
Start: 2023-07-27

## 2023-07-27 ASSESSMENT — ENCOUNTER SYMPTOMS
VOMITING: 0
DIARRHEA: 0
CONSTIPATION: 0
SHORTNESS OF BREATH: 0
NAUSEA: 0
COUGH: 0
ABDOMINAL PAIN: 0
WHEEZING: 0

## 2023-07-27 ASSESSMENT — PATIENT HEALTH QUESTIONNAIRE - PHQ9
SUM OF ALL RESPONSES TO PHQ QUESTIONS 1-9: 1
SUM OF ALL RESPONSES TO PHQ9 QUESTIONS 1 & 2: 1
2. FEELING DOWN, DEPRESSED OR HOPELESS: 1
SUM OF ALL RESPONSES TO PHQ QUESTIONS 1-9: 1
SUM OF ALL RESPONSES TO PHQ QUESTIONS 1-9: 1
1. LITTLE INTEREST OR PLEASURE IN DOING THINGS: 0
SUM OF ALL RESPONSES TO PHQ QUESTIONS 1-9: 1

## 2023-07-27 NOTE — PROGRESS NOTES
Jorge Luis Bruner (:  1955) is a 79 y.o. female,Established patient, here for evaluation of the following chief complaint(s):  Depression (4 month follow up on depression,patient states she just lost her mom)         ASSESSMENT/PLAN:  1. Acute stress disorder  -Continue CBT with therapist for now. Will reassess in 1 month for further management. 2. Spinal stenosis of lumbar region, unspecified whether neurogenic claudication present  -     diclofenac sodium (VOLTAREN) 1 % GEL; Apply 4 g topically 4 times daily as needed for Pain, Topical, 4 TIMES DAILY PRN Starting Thu 2023, Disp-150 g, R-3, Normal      Return in about 5 weeks (around 2023). Subjective   SUBJECTIVE/OBJECTIVE:  79years old female patient with past medical history of spinal stenosis of the lumbar region came to the office complaining of sleep disturbance that has been going for around a month. Patient that she lost her mom a month ago. She is also going through divorce. States she is feeling stressed. Her mood is up-and-down according to her. She also mention severe sleep disturbance. She has difficulty falling asleep, she falls asleep around 3 to 4 AM and gets up in the morning to work. PHQ 2 is 1. Patient still enjoys doing her daily activities and has friends who supports her. No suicidal homicidal ideation. She started seeing a therapist      Review of Systems   Constitutional:  Negative for diaphoresis, fatigue and fever. Respiratory:  Negative for cough, shortness of breath and wheezing. Cardiovascular:  Negative for chest pain, palpitations and leg swelling. Gastrointestinal:  Negative for abdominal pain, constipation, diarrhea, nausea and vomiting. Neurological:  Negative for dizziness, weakness, numbness and headaches. Psychiatric/Behavioral:  Positive for sleep disturbance. Negative for decreased concentration, dysphoric mood, self-injury and suicidal ideas.  The patient is not

## 2023-07-27 NOTE — PROGRESS NOTES
Visit Information    Have you changed or started any medications since your last visit including any over-the-counter medicines, vitamins, or herbal medicines? no   Have you stopped taking any of your medications? Is so, why? -  no  Are you having any side effects from any of your medications? - no    Have you seen any other physician or provider since your last visit? yes - Cardio, Gastro   Have you had any other diagnostic tests since your last visit? yes - Labs, CT, Echo   Have you been seen in the emergency room and/or had an admission in a hospital since we last saw you?  no   Have you had your routine dental cleaning in the past 6 months?  no     Do you have an active MyChart account? If no, what is the barrier?   Yes    Patient Care Team:  Philip Last DO as PCP - [address] Ella Murphy DO as PCP - [facility]    Medical History Review  Past Medical, Family, and Social History reviewed and does contribute to the patient presenting condition    Health Maintenance   Topic Date Due    Hepatitis C screen  Never done    DTaP/Tdap/Td vaccine (1 - Tdap) Never done    Shingles vaccine (1 of 2) Never done    Pneumococcal 65+ years Vaccine (1 - PCV) Never done    COVID-19 Vaccine (4 - Booster for [employer] series) 02/22/2022    Colorectal Cancer Screen  04/08/2023    Breast cancer screen  07/07/2023    Flu vaccine (1) 08/01/2023    A1C test (Diabetic or Prediabetic)  10/25/2023    Depression Screen  03/06/2024    Lipids  07/16/2024    DEXA (modify frequency per FRAX score)  Completed    Hepatitis A vaccine  Aged Out    Hib vaccine  Aged Out    Meningococcal (ACWY) vaccine  Aged Out

## 2023-07-27 NOTE — PATIENT INSTRUCTIONS
Thank you for letting us take care of you today. We hope all your questions were addressed. If a question was overlooked or something else comes to mind after you return home, please contact a member of your Care Team listed below. Your Care Team at [address] [place] [address] is Team #2  Jenna [place] M.D. (Faculty)  Yuliana Anthony, (Resident)  Rajiv Gallardo, (Resident)  Akiko Velasco, (Resident)  Adrienne Claude, (Resident)  Misti Avila, (Resident)  [facility], [address] [place] [address], [facility]  Evelia Chantilly,  [facility]  Alejo Ty, [facility]  Louann Dudley, [facility]  Shahana New, [facility]  Rojelio [place], [place] ([address])  Goldy Nieves, [facility] (Clinical Pharmacist)     Office phone number: [phone]    If you need to get in right away due to illness, please be advised we have \"Same Day\" appointments available Monday-Friday. Please call us at [phone] option #3 to schedule your \"Same Day\" appointment.

## 2023-07-31 ENCOUNTER — TELEPHONE (OUTPATIENT)
Dept: FAMILY MEDICINE CLINIC | Age: 68
End: 2023-07-31

## 2023-07-31 NOTE — TELEPHONE ENCOUNTER
----- Message from [place]. Rodney Westfall sent at 2023  9:29 AM EDT -----  Regarding: Leave for loss of mom and Divorce. Contact: [phone]  Dr Demi Freed I just lost my mom and I need some time off to  [facility]. Her death was  Unexpected at the time. I just started the process of my divorce also with the court and now waiting on my first court date. I was just in the office and the resident said to message you. I called the absence office and they sent me forms for a leave. I did not go back to work after her .  Can you please take me off work starting  2023 till  2023. I'm going to  Counseling for [employer] through life matters through the hospital. I've had only one session with the counselor and she said that I have a lot of grief inside of me and I would like to have a couple more sessions before I go back to work. This has been the hardest time in my life. But I know I will get through it. I will drop off papers to your office to be completed. Thank you Dr Demi Freed. Dagoberto Sherman.

## 2023-08-04 ENCOUNTER — TELEPHONE (OUTPATIENT)
Dept: FAMILY MEDICINE CLINIC | Age: 68
End: 2023-08-04

## 2023-08-04 NOTE — TELEPHONE ENCOUNTER
----- Message from Mariela Fenton sent at 8/4/2023  9:00 AM EDT -----  Subject: Message to Provider    QUESTIONS  Information for Provider? pt is calling about her FMLA forms, please let   her know when they are sent back to [employer]. pt is asking that the   completed copy please be uploaded to [employer]  ---------------------------------------------------------------------------  --------------  Claire [place] Justin  [phone]; OK to leave message on voicemail  ---------------------------------------------------------------------------  --------------  SCRIPT ANSWERS  Relationship to Patient?  Self

## 2023-08-04 NOTE — TELEPHONE ENCOUNTER
Patient called in stating that at her last office visit with you she stated to you that she want to go back to work on 8- not 8/4/2023 and 8-4 is on the return date. Patient would like a call back about the return date.

## 2023-09-01 ENCOUNTER — ANESTHESIA EVENT (OUTPATIENT)
Dept: OPERATING ROOM | Age: 68
End: 2023-09-01
Payer: COMMERCIAL

## 2023-09-01 ENCOUNTER — ANESTHESIA (OUTPATIENT)
Dept: OPERATING ROOM | Age: 68
End: 2023-09-01
Payer: COMMERCIAL

## 2023-09-01 ENCOUNTER — HOSPITAL ENCOUNTER (OUTPATIENT)
Age: 68
Setting detail: OUTPATIENT SURGERY
Discharge: HOME | End: 2023-09-01
Attending: INTERNAL MEDICINE | Admitting: INTERNAL MEDICINE
Payer: COMMERCIAL

## 2023-09-01 VITALS
TEMPERATURE: 97.1 F | SYSTOLIC BLOOD PRESSURE: 135 MMHG | HEART RATE: 68 BPM | HEIGHT: 64 IN | DIASTOLIC BLOOD PRESSURE: 71 MMHG | WEIGHT: 174 LBS | OXYGEN SATURATION: 100 % | RESPIRATION RATE: 16 BRPM | BODY MASS INDEX: 29.71 KG/M2

## 2023-09-01 DIAGNOSIS — R13.10 DYSPHAGIA, UNSPECIFIED TYPE: ICD-10-CM

## 2023-09-01 DIAGNOSIS — Z12.12 SCREENING FOR COLORECTAL CANCER: ICD-10-CM

## 2023-09-01 DIAGNOSIS — Z12.11 SCREENING FOR COLORECTAL CANCER: ICD-10-CM

## 2023-09-01 PROBLEM — K31.7 POLYP OF DUODENUM: Status: ACTIVE | Noted: 2023-09-01

## 2023-09-01 PROBLEM — K44.9 HIATAL HERNIA: Status: ACTIVE | Noted: 2023-09-01

## 2023-09-01 PROBLEM — D12.6 ADENOMATOUS POLYP OF COLON: Status: ACTIVE | Noted: 2023-09-01

## 2023-09-01 PROCEDURE — 2709999900 HC NON-CHARGEABLE SUPPLY: Performed by: INTERNAL MEDICINE

## 2023-09-01 PROCEDURE — 2580000003 HC RX 258

## 2023-09-01 PROCEDURE — 3700000000 HC ANESTHESIA ATTENDED CARE: Performed by: INTERNAL MEDICINE

## 2023-09-01 PROCEDURE — 3609010600 HC COLONOSCOPY POLYPECTOMY SNARE/COLD BIOPSY: Performed by: INTERNAL MEDICINE

## 2023-09-01 PROCEDURE — 6360000002 HC RX W HCPCS

## 2023-09-01 PROCEDURE — 3609012400 HC EGD TRANSORAL BIOPSY SINGLE/MULTIPLE: Performed by: INTERNAL MEDICINE

## 2023-09-01 PROCEDURE — 3700000001 HC ADD 15 MINUTES (ANESTHESIA): Performed by: INTERNAL MEDICINE

## 2023-09-01 PROCEDURE — 45385 COLONOSCOPY W/LESION REMOVAL: CPT | Performed by: INTERNAL MEDICINE

## 2023-09-01 PROCEDURE — 7100000011 HC PHASE II RECOVERY - ADDTL 15 MIN: Performed by: INTERNAL MEDICINE

## 2023-09-01 PROCEDURE — 2500000003 HC RX 250 WO HCPCS

## 2023-09-01 PROCEDURE — 43239 EGD BIOPSY SINGLE/MULTIPLE: CPT | Performed by: INTERNAL MEDICINE

## 2023-09-01 PROCEDURE — 88305 TISSUE EXAM BY PATHOLOGIST: CPT

## 2023-09-01 PROCEDURE — 7100000010 HC PHASE II RECOVERY - FIRST 15 MIN: Performed by: INTERNAL MEDICINE

## 2023-09-01 RX ORDER — PHENYLEPHRINE HCL IN 0.9% NACL 1 MG/10 ML
SYRINGE (ML) INTRAVENOUS PRN
Status: DISCONTINUED | OUTPATIENT
Start: 2023-09-01 | End: 2023-09-01 | Stop reason: SDUPTHER

## 2023-09-01 RX ORDER — SODIUM CHLORIDE 9 MG/ML
INJECTION, SOLUTION INTRAVENOUS PRN
Status: DISCONTINUED | OUTPATIENT
Start: 2023-09-01 | End: 2023-09-01 | Stop reason: HOSPADM

## 2023-09-01 RX ORDER — METOCLOPRAMIDE HYDROCHLORIDE 5 MG/ML
10 INJECTION INTRAMUSCULAR; INTRAVENOUS
Status: DISCONTINUED | OUTPATIENT
Start: 2023-09-01 | End: 2023-09-01 | Stop reason: HOSPADM

## 2023-09-01 RX ORDER — FENTANYL CITRATE 50 UG/ML
INJECTION, SOLUTION INTRAMUSCULAR; INTRAVENOUS PRN
Status: DISCONTINUED | OUTPATIENT
Start: 2023-09-01 | End: 2023-09-01 | Stop reason: SDUPTHER

## 2023-09-01 RX ORDER — PROPOFOL 10 MG/ML
INJECTION, EMULSION INTRAVENOUS CONTINUOUS PRN
Status: DISCONTINUED | OUTPATIENT
Start: 2023-09-01 | End: 2023-09-01 | Stop reason: SDUPTHER

## 2023-09-01 RX ORDER — SODIUM CHLORIDE 0.9 % (FLUSH) 0.9 %
5-40 SYRINGE (ML) INJECTION PRN
Status: DISCONTINUED | OUTPATIENT
Start: 2023-09-01 | End: 2023-09-01 | Stop reason: HOSPADM

## 2023-09-01 RX ORDER — GLYCOPYRROLATE 0.2 MG/ML
INJECTION INTRAMUSCULAR; INTRAVENOUS PRN
Status: DISCONTINUED | OUTPATIENT
Start: 2023-09-01 | End: 2023-09-01 | Stop reason: SDUPTHER

## 2023-09-01 RX ORDER — LIDOCAINE HYDROCHLORIDE 10 MG/ML
INJECTION, SOLUTION EPIDURAL; INFILTRATION; INTRACAUDAL; PERINEURAL PRN
Status: DISCONTINUED | OUTPATIENT
Start: 2023-09-01 | End: 2023-09-01 | Stop reason: SDUPTHER

## 2023-09-01 RX ORDER — DROPERIDOL 2.5 MG/ML
0.62 INJECTION, SOLUTION INTRAMUSCULAR; INTRAVENOUS
Status: DISCONTINUED | OUTPATIENT
Start: 2023-09-01 | End: 2023-09-01 | Stop reason: HOSPADM

## 2023-09-01 RX ORDER — HYDRALAZINE HYDROCHLORIDE 20 MG/ML
10 INJECTION INTRAMUSCULAR; INTRAVENOUS
Status: DISCONTINUED | OUTPATIENT
Start: 2023-09-01 | End: 2023-09-01 | Stop reason: HOSPADM

## 2023-09-01 RX ORDER — PROPOFOL 10 MG/ML
INJECTION, EMULSION INTRAVENOUS PRN
Status: DISCONTINUED | OUTPATIENT
Start: 2023-09-01 | End: 2023-09-01 | Stop reason: SDUPTHER

## 2023-09-01 RX ORDER — SODIUM CHLORIDE, SODIUM LACTATE, POTASSIUM CHLORIDE, CALCIUM CHLORIDE 600; 310; 30; 20 MG/100ML; MG/100ML; MG/100ML; MG/100ML
INJECTION, SOLUTION INTRAVENOUS CONTINUOUS PRN
Status: DISCONTINUED | OUTPATIENT
Start: 2023-09-01 | End: 2023-09-01 | Stop reason: SDUPTHER

## 2023-09-01 RX ORDER — DIPHENHYDRAMINE HYDROCHLORIDE 50 MG/ML
12.5 INJECTION INTRAMUSCULAR; INTRAVENOUS
Status: DISCONTINUED | OUTPATIENT
Start: 2023-09-01 | End: 2023-09-01 | Stop reason: HOSPADM

## 2023-09-01 RX ORDER — MEPERIDINE HYDROCHLORIDE 50 MG/ML
12.5 INJECTION INTRAMUSCULAR; INTRAVENOUS; SUBCUTANEOUS EVERY 5 MIN PRN
Status: DISCONTINUED | OUTPATIENT
Start: 2023-09-01 | End: 2023-09-01 | Stop reason: HOSPADM

## 2023-09-01 RX ORDER — SODIUM CHLORIDE 0.9 % (FLUSH) 0.9 %
5-40 SYRINGE (ML) INJECTION EVERY 12 HOURS SCHEDULED
Status: DISCONTINUED | OUTPATIENT
Start: 2023-09-01 | End: 2023-09-01 | Stop reason: HOSPADM

## 2023-09-01 RX ADMIN — PROPOFOL 150 MCG/KG/MIN: 10 INJECTION, EMULSION INTRAVENOUS at 09:31

## 2023-09-01 RX ADMIN — PROPOFOL 50 MG: 10 INJECTION, EMULSION INTRAVENOUS at 09:31

## 2023-09-01 RX ADMIN — GLYCOPYRROLATE 0.2 MG: 0.2 INJECTION INTRAMUSCULAR; INTRAVENOUS at 09:33

## 2023-09-01 RX ADMIN — FENTANYL CITRATE 25 MCG: 50 INJECTION, SOLUTION INTRAMUSCULAR; INTRAVENOUS at 09:43

## 2023-09-01 RX ADMIN — FENTANYL CITRATE 25 MCG: 50 INJECTION, SOLUTION INTRAMUSCULAR; INTRAVENOUS at 09:39

## 2023-09-01 RX ADMIN — FENTANYL CITRATE 50 MCG: 50 INJECTION, SOLUTION INTRAMUSCULAR; INTRAVENOUS at 09:33

## 2023-09-01 RX ADMIN — Medication 200 MCG: at 10:18

## 2023-09-01 RX ADMIN — SODIUM CHLORIDE, POTASSIUM CHLORIDE, SODIUM LACTATE AND CALCIUM CHLORIDE: 600; 310; 30; 20 INJECTION, SOLUTION INTRAVENOUS at 10:20

## 2023-09-01 RX ADMIN — SODIUM CHLORIDE, POTASSIUM CHLORIDE, SODIUM LACTATE AND CALCIUM CHLORIDE: 600; 310; 30; 20 INJECTION, SOLUTION INTRAVENOUS at 09:28

## 2023-09-01 RX ADMIN — Medication 100 MCG: at 09:52

## 2023-09-01 RX ADMIN — LIDOCAINE HYDROCHLORIDE 50 MG: 10 INJECTION, SOLUTION EPIDURAL; INFILTRATION; INTRACAUDAL; PERINEURAL at 09:31

## 2023-09-01 ASSESSMENT — PAIN - FUNCTIONAL ASSESSMENT: PAIN_FUNCTIONAL_ASSESSMENT: 0-10

## 2023-09-01 NOTE — DISCHARGE INSTRUCTIONS
[facility]    POST-ENDOSCOPY INSTRUCTIONS:    1. ACTIVITY   No driving, operating machinery, or making important decisions for 24 hours. Resume normal activity after 24 hours. You may return to work after 24 hours. 2. DIET     ____ (EGD/ERCP):  Do not eat or drink for one hour after your exam.  You may then   try a sip of water, and if you are able to swallow as usual you may advance to a   regular diet. ____ (Colonscopy/Flex Sig):  Resume your usual diet unless specified below. ____ Diet Modification:***    3. MEDICATIONS (Do not consume alcohol, tranquilizers, or sleeping medications for 24           hours unless advised by your physician)       _____ Resume your usual medications    4. PHYSICIAN FOLLOW-UP        ____ Please call the office for an appointment/further instructions. ***        ____ See your family physician. 5. ADDITIONAL INSTRUCTIONS      ***    6. NORMAL CHANGES YOU MAY EXPERIENCE AFTER ENDOSCOPY:          EGD/ERCP     COLONOSCOPY      Sore throat after EGD/ERCP   Passing of gas for several hours after      A bloated feeling and belching from  Some mild abdominal cramping   air in stomach    If a biopsy/polypectomy was done, you      If a biopsy was done, you may spit   may see some spotting of blood   up some blood tinged mucous  You may feel fatigued for the next 24-48         hours due to the prep and sedation    7. CALL YOUR PHYSICIAN IF YOU EXPERIENCE ANY OF THE FOLLOWING:      A.  Passing blood rectally or vomiting blood (color may be red or black)      B. Severe abdominal pain or tenderness (that is not relieved by passing air)      C.   Fever, chills, or excessive sweating      D.  Persistent nausea or vomiting      E.  Redness or swelling at the IV site    If you have additional questions, PLEASE call your doctor @ _______________________ or the [address] [place] [address] office at [phone]    Call your doctor for the following:   Chills   Temperature greater than 101   Pain

## 2023-09-01 NOTE — ANESTHESIA PRE PROCEDURE
Department of Anesthesiology  Preprocedure Note       Name:  Kacie Hancock   Age:  79 y.o.  :  1955                                          MRN:  [id number]         Date:  2023      Surgeon: Pj Loredo):  Hollie Perez MD    Procedure: Procedure(s):  COLORECTAL CANCER SCREENING, NOT HIGH RISK  EGD ESOPHAGOGASTRODUODENOSCOPY    Medications prior to admission:   Prior to Admission medications    Medication Sig Start Date End Date Taking?  Authorizing Provider   Cyanocobalamin (VITAMIN B 12 PO) Take 3,000 mcg by mouth daily   Yes Historical Provider, MD   Cholecalciferol (VITAMIN D3) 125 MCG (5000 UT) TABS Take 1 tablet by mouth daily   Yes Historical Provider, MD   polyethylene glycol (GLYCOLAX) 17 GM/SCOOP powder Take as directed for bowel prep 23   Hollie Perez MD   bisacodyl 5 MG EC tablet Take as directed for bowel prep 23   Hollie Perez MD   diclofenac sodium (VOLTAREN) 1 % GEL Apply 4 g topically 4 times daily as needed for Pain  Patient taking differently: Apply 4 g topically 4 times daily as needed for Pain (right elbow) 23   Robyn Ponce MD   melatonin 5 MG TABS tablet Take 1 tablet by mouth nightly 23   Robyn Ponce MD   omeprazole (PRILOSEC) 20 MG delayed release capsule Take 1 capsule by mouth every morning (before breakfast) 23   Hollie Perez MD   busPIRone (BUSPAR) 10 MG tablet Take 1 tablet by mouth daily 22   Historical Provider, MD   fluticasone (FLONASE) 50 MCG/ACT nasal spray 2 sprays by Each Nostril route daily  Patient taking differently: 2 sprays by Each Nostril route daily as needed for Allergies 22   ABDI Diehl - CNP   EPINEPHrine (EPIPEN 2-ANSON) 0.3 MG/0.3ML SOAJ injection Inject 0.3 mLs into the muscle once for 1 dose Use as directed for allergic reaction 10/19/22 10/19/22  Romel Barros DO   ibuprofen (ADVIL;MOTRIN) 600 MG tablet Take 1 tablet by mouth 3 times daily as needed for Pain 10/11/22   Annette Stark

## 2023-09-01 NOTE — OP NOTE
Operative Note      Patient: Alfredo Riddle  YOB: 1955  MRN: [id number]    Date of Procedure: 9/1/2023    Pre-Op Diagnosis Codes: * Dysphagia, unspecified type [R13.10]     * Screening for colorectal cancer [Z12.11, Z12.12]    Post-Op Diagnosis: Hiatal hernia, gastritis. Duodenal polyps, torturous colon, FAIR PREP, colon polyps, hemorrhoids       Procedure(s):  COLONOSCOPY POLYPECTOMY SNARE/COLD BIOPSY ,, HOT  EGD BIOPSY    Surgeon(s):  Carlitos De La Cruz MD    Assistant:   First Assistant: Chin Hernandez RN    Anesthesia: Monitor Anesthesia Care    Estimated Blood Loss (mL): Minimal    Complications: None    Specimens:   ID Type Source Tests Collected by Time Destination   A : gastric BIOPSY Tissue Stomach SURGICAL PATHOLOGY Carlitos De La Cruz MD 9/1/2023 7370    B : DUODENUM POLYP Tissue Duodenum SURGICAL PATHOLOGY Carlitos De La Cruz MD 9/1/2023 0945    C : HEPATIC FLEXURE POLYP Tissue Colon SURGICAL PATHOLOGY Carlitos De La Cruz MD 9/1/2023 1007    D : SIGMOID COLON POLYP Tissue Sigmoid Colon SURGICAL PATHOLOGY Carlitos De La Cruz MD 9/1/2023 1015        Implants:  * No implants in log *      Drains: * No LDAs found *            [facility] ENDOSCOPY     EGD    PROCEDURE DATE: 09/01/23    REFERRING PHYSICIAN: No ref. provider found     PRIMARY CARE PROVIDER: James Hernandez DO    ATTENDING PHYSICIAN: Carlitos De La Cruz MD     HISTORY: Ms. Alfredo Riddle is a 79 y.o. female who presents to the [facility] Endoscopy unit for upper endoscopy. The patient's clinical history is remarkable for pre-DM, referred for dysphagia and screening colonoscopy. She is currently medically stable and appropriate for the planned procedure. PREOPERATIVE DIAGNOSIS: Intermittent dysphagia. PROCEDURES:   1) Transoral Upper Endoscopy with cold biopsy. POSTOPERATIVE DIAGNOSIS:     1-2 cm hiatal hernia identified between 36 to 38 cm, strictures or obstructive lesions.   No evidence of esophagitis  2-mild scattered areas of erythema identified in salvador         [facility]  Gastroenterology   09/01/23    This note is created with the assistance of a speech recognition program.  While intending to generate a document that actually reflects the content of the visit, the document can still have some errors including those of syntax and sound a like substitutions which may escape proof reading. It such instances, actual meaning can be extrapolated by contextual diversion.

## 2023-09-06 LAB — SURGICAL PATHOLOGY REPORT: NORMAL

## 2023-09-15 ENCOUNTER — OFFICE VISIT (OUTPATIENT)
Dept: FAMILY MEDICINE CLINIC | Age: 68
End: 2023-09-15
Payer: COMMERCIAL

## 2023-09-15 VITALS
HEIGHT: 64 IN | WEIGHT: 176.8 LBS | SYSTOLIC BLOOD PRESSURE: 138 MMHG | TEMPERATURE: 98.5 F | BODY MASS INDEX: 30.19 KG/M2 | DIASTOLIC BLOOD PRESSURE: 64 MMHG | HEART RATE: 79 BPM

## 2023-09-15 DIAGNOSIS — R73.9 BLOOD GLUCOSE ELEVATED: ICD-10-CM

## 2023-09-15 DIAGNOSIS — E78.5 HYPERLIPIDEMIA, UNSPECIFIED HYPERLIPIDEMIA TYPE: Primary | ICD-10-CM

## 2023-09-15 DIAGNOSIS — Z12.31 ENCOUNTER FOR SCREENING MAMMOGRAM FOR BREAST CANCER: ICD-10-CM

## 2023-09-15 PROCEDURE — 1123F ACP DISCUSS/DSCN MKR DOCD: CPT | Performed by: FAMILY MEDICINE

## 2023-09-15 PROCEDURE — 99213 OFFICE O/P EST LOW 20 MIN: CPT | Performed by: FAMILY MEDICINE

## 2023-09-15 SDOH — ECONOMIC STABILITY: HOUSING INSECURITY
IN THE LAST 12 MONTHS, WAS THERE A TIME WHEN YOU DID NOT HAVE A STEADY PLACE TO SLEEP OR SLEPT IN A SHELTER (INCLUDING NOW)?: NO

## 2023-09-15 SDOH — ECONOMIC STABILITY: FOOD INSECURITY: WITHIN THE PAST 12 MONTHS, THE FOOD YOU BOUGHT JUST DIDN'T LAST AND YOU DIDN'T HAVE MONEY TO GET MORE.: NEVER TRUE

## 2023-09-15 SDOH — ECONOMIC STABILITY: FOOD INSECURITY: WITHIN THE PAST 12 MONTHS, YOU WORRIED THAT YOUR FOOD WOULD RUN OUT BEFORE YOU GOT MONEY TO BUY MORE.: NEVER TRUE

## 2023-09-15 SDOH — ECONOMIC STABILITY: INCOME INSECURITY: HOW HARD IS IT FOR YOU TO PAY FOR THE VERY BASICS LIKE FOOD, HOUSING, MEDICAL CARE, AND HEATING?: HARD

## 2023-09-15 ASSESSMENT — PATIENT HEALTH QUESTIONNAIRE - PHQ9
SUM OF ALL RESPONSES TO PHQ QUESTIONS 1-9: 0
1. LITTLE INTEREST OR PLEASURE IN DOING THINGS: 0
2. FEELING DOWN, DEPRESSED OR HOPELESS: 0
SUM OF ALL RESPONSES TO PHQ9 QUESTIONS 1 & 2: 0
SUM OF ALL RESPONSES TO PHQ QUESTIONS 1-9: 0

## 2023-09-15 NOTE — PROGRESS NOTES
Visit Information    Have you changed or started any medications since your last visit including any over-the-counter medicines, vitamins, or herbal medicines? no   Have you stopped taking any of your medications? Is so, why? -  no  Are you having any side effects from any of your medications? - no    Have you seen any other physician or provider since your last visit?  no   Have you had any other diagnostic tests since your last visit?  no   Have you been seen in the emergency room and/or had an admission in a hospital since we last saw you?  no   Have you had your routine dental cleaning in the past 6 months?  no     Do you have an active MyChart account? If no, what is the barrier?   Yes    Patient Care Team:  Harper Garcia DO as PCP - [address] Ella Murphy DO as PCP - [facility] Provider    Medical History Review  Past Medical, Family, and Social History reviewed and does not contribute to the patient presenting condition    Health Maintenance   Topic Date Due    Hepatitis C screen  Never done    DTaP/Tdap/Td vaccine (1 - Tdap) Never done    Shingles vaccine (1 of 2) Never done    Pneumococcal 65+ years Vaccine (1 - PCV) Never done    COVID-19 Vaccine (4 - [employer] series) 02/22/2022    Breast cancer screen  07/07/2023    Flu vaccine (1) 08/01/2023    A1C test (Diabetic or Prediabetic)  10/25/2023    Lipids  07/16/2024    Depression Screen  07/27/2024    Colorectal Cancer Screen  09/01/2033    DEXA (modify frequency per FRAX score)  Completed    Hepatitis A vaccine  Aged Out    Hepatitis B vaccine  Aged Out    Hib vaccine  Aged Out    Meningococcal (ACWY) vaccine  Aged Out

## 2023-10-07 ENCOUNTER — HOSPITAL ENCOUNTER (EMERGENCY)
Age: 68
Discharge: HOME | End: 2023-10-08
Attending: SPECIALIST
Payer: COMMERCIAL

## 2023-10-07 ENCOUNTER — APPOINTMENT (OUTPATIENT)
Dept: GENERAL RADIOLOGY | Age: 68
End: 2023-10-07
Payer: COMMERCIAL

## 2023-10-07 VITALS
SYSTOLIC BLOOD PRESSURE: 171 MMHG | OXYGEN SATURATION: 97 % | HEART RATE: 98 BPM | BODY MASS INDEX: 30.39 KG/M2 | RESPIRATION RATE: 22 BRPM | WEIGHT: 178 LBS | HEIGHT: 64 IN | DIASTOLIC BLOOD PRESSURE: 86 MMHG | TEMPERATURE: 98.6 F

## 2023-10-07 DIAGNOSIS — J98.01 BRONCHOSPASM: ICD-10-CM

## 2023-10-07 DIAGNOSIS — J06.9 VIRAL URI WITH COUGH: Primary | ICD-10-CM

## 2023-10-07 LAB
ALBUMIN SERPL-MCNC: 4.5 G/DL (ref 3.5–5.2)
ALBUMIN/GLOB SERPL: 1.6 {RATIO} (ref 1–2.5)
ALP SERPL-CCNC: 90 U/L (ref 35–104)
ALT SERPL-CCNC: 19 U/L (ref 5–33)
ANION GAP SERPL CALCULATED.3IONS-SCNC: 8 MMOL/L (ref 9–17)
AST SERPL-CCNC: 18 U/L
BASOPHILS # BLD: 0 K/UL (ref 0–0.2)
BASOPHILS NFR BLD: 1 % (ref 0–2)
BILIRUB SERPL-MCNC: 0.2 MG/DL (ref 0.3–1.2)
BNP SERPL-MCNC: <36 PG/ML
BUN SERPL-MCNC: 19 MG/DL (ref 8–23)
CALCIUM SERPL-MCNC: 9.8 MG/DL (ref 8.6–10.4)
CHLORIDE SERPL-SCNC: 107 MMOL/L (ref 98–107)
CO2 SERPL-SCNC: 27 MMOL/L (ref 20–31)
CREAT SERPL-MCNC: 0.9 MG/DL (ref 0.5–0.9)
EOSINOPHIL # BLD: 0.2 K/UL (ref 0–0.4)
EOSINOPHILS RELATIVE PERCENT: 2 % (ref 1–4)
ERYTHROCYTE [DISTWIDTH] IN BLOOD BY AUTOMATED COUNT: 16 % (ref 12.5–15.4)
GFR SERPL CREATININE-BSD FRML MDRD: >60 ML/MIN/1.73M2
GLUCOSE SERPL-MCNC: 104 MG/DL (ref 70–99)
HCT VFR BLD AUTO: 37.6 % (ref 36–46)
HGB BLD-MCNC: 13.1 G/DL (ref 12–16)
LYMPHOCYTES NFR BLD: 1 K/UL (ref 1–4.8)
LYMPHOCYTES RELATIVE PERCENT: 12 % (ref 24–44)
MAGNESIUM SERPL-MCNC: 1.9 MG/DL (ref 1.6–2.6)
MCH RBC QN AUTO: 32.5 PG (ref 26–34)
MCHC RBC AUTO-ENTMCNC: 34.8 G/DL (ref 31–37)
MCV RBC AUTO: 93.4 FL (ref 80–100)
MONOCYTES NFR BLD: 0.3 K/UL (ref 0.1–1.2)
MONOCYTES NFR BLD: 4 % (ref 2–11)
NEUTROPHILS NFR BLD: 81 % (ref 36–66)
NEUTS SEG NFR BLD: 7.2 K/UL (ref 1.8–7.7)
PLATELET # BLD AUTO: 281 K/UL (ref 140–450)
PMV BLD AUTO: 8.3 FL (ref 6–12)
POTASSIUM SERPL-SCNC: 4.2 MMOL/L (ref 3.7–5.3)
PROT SERPL-MCNC: 7.3 G/DL (ref 6.4–8.3)
RBC # BLD AUTO: 4.02 M/UL (ref 4–5.2)
SODIUM SERPL-SCNC: 142 MMOL/L (ref 135–144)
TROPONIN I SERPL HS-MCNC: <6 NG/L (ref 0–14)
WBC OTHER # BLD: 8.8 K/UL (ref 3.5–11)

## 2023-10-07 PROCEDURE — 71045 X-RAY EXAM CHEST 1 VIEW: CPT

## 2023-10-07 PROCEDURE — 96374 THER/PROPH/DIAG INJ IV PUSH: CPT

## 2023-10-07 PROCEDURE — 6370000000 HC RX 637 (ALT 250 FOR IP): Performed by: SPECIALIST

## 2023-10-07 PROCEDURE — 99285 EMERGENCY DEPT VISIT HI MDM: CPT

## 2023-10-07 PROCEDURE — 94640 AIRWAY INHALATION TREATMENT: CPT

## 2023-10-07 PROCEDURE — 83880 ASSAY OF NATRIURETIC PEPTIDE: CPT

## 2023-10-07 PROCEDURE — 84484 ASSAY OF TROPONIN QUANT: CPT

## 2023-10-07 PROCEDURE — 36415 COLL VENOUS BLD VENIPUNCTURE: CPT

## 2023-10-07 PROCEDURE — 83735 ASSAY OF MAGNESIUM: CPT

## 2023-10-07 PROCEDURE — 96375 TX/PRO/DX INJ NEW DRUG ADDON: CPT

## 2023-10-07 PROCEDURE — 85025 COMPLETE CBC W/AUTO DIFF WBC: CPT

## 2023-10-07 PROCEDURE — 93005 ELECTROCARDIOGRAM TRACING: CPT | Performed by: SPECIALIST

## 2023-10-07 PROCEDURE — 80053 COMPREHEN METABOLIC PANEL: CPT

## 2023-10-07 PROCEDURE — 6360000002 HC RX W HCPCS: Performed by: SPECIALIST

## 2023-10-07 RX ORDER — KETOROLAC TROMETHAMINE 15 MG/ML
15 INJECTION, SOLUTION INTRAMUSCULAR; INTRAVENOUS ONCE
Status: COMPLETED | OUTPATIENT
Start: 2023-10-07 | End: 2023-10-07

## 2023-10-07 RX ORDER — IPRATROPIUM BROMIDE AND ALBUTEROL SULFATE 2.5; .5 MG/3ML; MG/3ML
1 SOLUTION RESPIRATORY (INHALATION)
Status: COMPLETED | OUTPATIENT
Start: 2023-10-07 | End: 2023-10-07

## 2023-10-07 RX ADMIN — KETOROLAC TROMETHAMINE 15 MG: 15 INJECTION, SOLUTION INTRAMUSCULAR; INTRAVENOUS at 22:20

## 2023-10-07 RX ADMIN — IPRATROPIUM BROMIDE AND ALBUTEROL SULFATE 1 DOSE: .5; 2.5 SOLUTION RESPIRATORY (INHALATION) at 22:27

## 2023-10-07 RX ADMIN — METHYLPREDNISOLONE SODIUM SUCCINATE 125 MG: 125 INJECTION, POWDER, FOR SOLUTION INTRAMUSCULAR; INTRAVENOUS at 22:21

## 2023-10-07 ASSESSMENT — ENCOUNTER SYMPTOMS
SHORTNESS OF BREATH: 1
BACK PAIN: 1
WHEEZING: 1
SORE THROAT: 0
ABDOMINAL PAIN: 0
COUGH: 1
NAUSEA: 0
VOMITING: 0

## 2023-10-07 ASSESSMENT — PAIN SCALES - GENERAL
PAINLEVEL_OUTOF10: 7
PAINLEVEL_OUTOF10: 7

## 2023-10-07 ASSESSMENT — PAIN - FUNCTIONAL ASSESSMENT: PAIN_FUNCTIONAL_ASSESSMENT: 0-10

## 2023-10-08 RX ORDER — ALBUTEROL SULFATE 90 UG/1
1-2 AEROSOL, METERED RESPIRATORY (INHALATION) EVERY 4 HOURS PRN
Qty: 18 G | Refills: 0 | Status: SHIPPED | OUTPATIENT
Start: 2023-10-08

## 2023-10-08 RX ORDER — PREDNISONE 20 MG/1
20 TABLET ORAL 2 TIMES DAILY
Qty: 10 TABLET | Refills: 0 | Status: SHIPPED | OUTPATIENT
Start: 2023-10-08 | End: 2023-10-13

## 2023-10-08 NOTE — ED PROVIDER NOTES
[address] ENCOUNTER      Pt Name: Eze Grover  MRN: [id number]  [address] [facility] [place] 1955  Date of evaluation: 10/7/23      CHIEF COMPLAINT       Chief Complaint   Patient presents with    Shortness of [address] is a 76 y.o. female who presents to the emergency department for evaluation of cough with slight clear sputum associated shortness of breath since early this afternoon at around 2 PM.  She admits to wheezing. She denies any chest pain, lightheadedness, dizziness, nausea, vomiting, palpitations or diaphoresis. She also denies any fever or chills. She denies any swelling in the legs or calf pain. She has history of several environmental allergies including mold from the leaves and dust mites as well as cats. She is non-smoker. She was prescribed inhaler in the past but she has ran out of inhaler recently. She took Benadryl and Zyrtec with slight relief. She has had some itching on the face which is resolved but she continues having shortness of breath. She also has vague right upper back pain. She denies any abdominal pain, vomiting or diarrhea. She denies any history of asthma or COPD. There are no exacerbating or relieving factors. She denies any recent long travels or prolonged immobilization and no history of DVT or PE in the past.  She denies any history of coronary artery disease, congestive heart failure or cardiac arrhythmia. REVIEW OF SYSTEMS       Review of Systems   Constitutional:  Negative for chills and fever. HENT:  Negative for congestion and sore throat. Respiratory:  Positive for cough, shortness of breath and wheezing. Cardiovascular:  Negative for chest pain, palpitations and leg swelling. Gastrointestinal:  Negative for abdominal pain, nausea and vomiting. Musculoskeletal:  Positive for back pain. Neurological:  Negative for dizziness, syncope and light-headedness.    All other

## 2023-10-08 NOTE — DISCHARGE INSTRUCTIONS
Please understand that at this time there is no evidence for a more serious underlying process, but that early in the process of an illness or injury, an emergency department workup can be falsely reassuring. You should contact your family doctor within the next 48 hours for a follow up appointment    [address]!!!    From [facility] ([facility]) and [facility] Emergency Services    On behalf of the Emergency Department staff at [facility]), I would like to thank you for giving us the opportunity to address your health care needs and concerns. We hope that during your visit, our service was delivered in a professional and caring manner. Please keep [facility] ([facility]) in mind as we walk with you down the path to your own personal wellness. Please expect an automated text message or email from us so we can ask a few questions about your health and progress. Based on your answers, a clinician may call you back to offer help and instructions. Please understand that early in the process of an illness or injury, an emergency department workup can be falsely reassuring. If you notice any worsening, changing or persistent symptoms please call your family doctor or return to the ER immediately. Tell us how we did during your visit at [email or web address]   and let us know about your experience

## 2023-10-09 LAB
EKG ATRIAL RATE: 92 BPM
EKG P AXIS: 72 DEGREES
EKG P-R INTERVAL: 194 MS
EKG Q-T INTERVAL: 348 MS
EKG QRS DURATION: 82 MS
EKG QTC CALCULATION (BAZETT): 430 MS
EKG R AXIS: 39 DEGREES
EKG T AXIS: 64 DEGREES
EKG VENTRICULAR RATE: 92 BPM

## 2023-10-10 ENCOUNTER — TELEPHONE (OUTPATIENT)
Dept: FAMILY MEDICINE CLINIC | Age: 68
End: 2023-10-10

## 2023-10-10 NOTE — TELEPHONE ENCOUNTER
Pt called stating she needs a current handi cap placard, stated the one she recently picked up is too old. Please give to Kassi Neal to mail when complete.

## 2023-10-13 ENCOUNTER — CLINICAL DOCUMENTATION (OUTPATIENT)
Dept: FAMILY MEDICINE CLINIC | Age: 68
End: 2023-10-13

## 2023-10-13 NOTE — PROGRESS NOTES
Patient needs handicap placard. The 1 issued in March not acceptable at this time. We will reprint a new statement.

## 2023-12-31 DIAGNOSIS — B35.1 ONYCHOMYCOSIS OF TOENAIL: ICD-10-CM
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
## 2024-01-03 ENCOUNTER — OFFICE VISIT (OUTPATIENT)
Dept: FAMILY MEDICINE CLINIC | Age: 69
End: 2024-01-03
Payer: COMMERCIAL

## 2024-01-03 VITALS
WEIGHT: 171 LBS | HEIGHT: 64 IN | HEART RATE: 65 BPM | SYSTOLIC BLOOD PRESSURE: 132 MMHG | BODY MASS INDEX: 29.19 KG/M2 | DIASTOLIC BLOOD PRESSURE: 75 MMHG

## 2024-01-03 DIAGNOSIS — K75.9 HEPATITIS: ICD-10-CM

## 2024-01-03 DIAGNOSIS — A64 STI (SEXUALLY TRANSMITTED INFECTION): ICD-10-CM

## 2024-01-03 DIAGNOSIS — Z11.4 SCREENING FOR HIV (HUMAN IMMUNODEFICIENCY VIRUS): ICD-10-CM

## 2024-01-03 DIAGNOSIS — B35.1 ONYCHOMYCOSIS OF TOENAIL: Primary | ICD-10-CM

## 2024-01-03 PROCEDURE — 1123F ACP DISCUSS/DSCN MKR DOCD: CPT | Performed by: FAMILY MEDICINE

## 2024-01-03 PROCEDURE — 99213 OFFICE O/P EST LOW 20 MIN: CPT | Performed by: FAMILY MEDICINE

## 2024-01-03 RX ORDER — CICLOPIROX 80 MG/ML
SOLUTION TOPICAL
Qty: 6.6 ML | Refills: 0 | OUTPATIENT
Start: 2024-01-03

## 2024-01-03 ASSESSMENT — PATIENT HEALTH QUESTIONNAIRE - PHQ9
1. LITTLE INTEREST OR PLEASURE IN DOING THINGS: 0
SUM OF ALL RESPONSES TO PHQ QUESTIONS 1-9: 1
2. FEELING DOWN, DEPRESSED OR HOPELESS: 1
SUM OF ALL RESPONSES TO PHQ9 QUESTIONS 1 & 2: 1
SUM OF ALL RESPONSES TO PHQ QUESTIONS 1-9: 1

## 2024-01-03 NOTE — PROGRESS NOTES
STI concerns (last contacted about 1 year ago)  Hepatitis concern    Needs screens for hepatitis, sti    FMLA good through 08/2024    Still in divorce proceeding  New  (last one dropped the case from her)    Marital strife  Anxious        Negative for:     Worry / mood complaints  Headache  Dizziness  Visual Disturbance  Hearing Changes  Nasal / sinus Symptoms  Mouth / tooth symptom, pain  Throat pain  Difficulty swallowing  Neck pain  Chest discomfort  Cough  SOB  N/V/D/C  Pelvic area discomfort  Bladder / voiding discomfort  Bowel complaints  MSk complaints   Numbness/tingling/abnormal sensations   Edema / Leg swelling  Dizziness  Fatigue  Bleeding   Skin    Pertinent Pos: See HPI -     Vitals:    01/03/24 1558   BP: 132/75   Pulse: 65       Alert and oriented to PPT  NAD    HEENT - neg  Neck - no bruits, no lymphadenopathy  Chest  HRRR w/o murmer  LCTAB no wheezes / rhonchi    Gait / Station - stable, no dysequilibrium, uniform pace, no assist device, cane.     Diagnosis Orders   1. Onychomycosis of toenail  ciclopirox (PENLAC) 8 % solution      2. STI (sexually transmitted infection)  Chlamydia/GC DNA, Urine    T. Pallidum Ab    Herpes Profile    Hepatitis Panel, Acute    Hepatic Function Panel      3. Hepatitis  Chlamydia/GC DNA, Urine    T. Pallidum Ab    Herpes Profile    Hepatitis Panel, Acute    Hepatic Function Panel      4. Screening for HIV (human immunodeficiency virus)  HIV Screen          Plan:  1.)  see orders  2.)  call results

## 2024-02-12 ENCOUNTER — APPOINTMENT (OUTPATIENT)
Dept: CT IMAGING | Age: 69
End: 2024-02-12
Payer: COMMERCIAL

## 2024-02-12 ENCOUNTER — HOSPITAL ENCOUNTER (EMERGENCY)
Age: 69
Discharge: HOME | End: 2024-02-12
Attending: EMERGENCY MEDICINE
Payer: COMMERCIAL

## 2024-02-12 VITALS
WEIGHT: 176 LBS | RESPIRATION RATE: 17 BRPM | HEART RATE: 72 BPM | SYSTOLIC BLOOD PRESSURE: 150 MMHG | OXYGEN SATURATION: 100 % | DIASTOLIC BLOOD PRESSURE: 71 MMHG | BODY MASS INDEX: 30.05 KG/M2 | TEMPERATURE: 97.3 F | HEIGHT: 64 IN

## 2024-02-12 DIAGNOSIS — K57.32 DIVERTICULITIS OF COLON: Primary | ICD-10-CM

## 2024-02-12 LAB
ALBUMIN SERPL-MCNC: 4.4 G/DL (ref 3.5–5.2)
ALBUMIN/GLOB SERPL: 1.6 {RATIO} (ref 1–2.5)
ALP SERPL-CCNC: 85 U/L (ref 35–104)
ALT SERPL-CCNC: 19 U/L (ref 5–33)
ANION GAP SERPL CALCULATED.3IONS-SCNC: 10 MMOL/L (ref 9–17)
AST SERPL-CCNC: 17 U/L
BACTERIA URNS QL MICRO: NORMAL
BASOPHILS # BLD: 0.04 K/UL (ref 0–0.2)
BASOPHILS NFR BLD: 1 % (ref 0–2)
BILIRUB SERPL-MCNC: 1.8 MG/DL (ref 0.3–1.2)
BILIRUB UR QL STRIP: NEGATIVE
BUN SERPL-MCNC: 16 MG/DL (ref 8–23)
CALCIUM SERPL-MCNC: 9.3 MG/DL (ref 8.6–10.4)
CASTS #/AREA URNS LPF: NORMAL /LPF (ref 0–8)
CHLORIDE SERPL-SCNC: 105 MMOL/L (ref 98–107)
CLARITY UR: CLEAR
CO2 SERPL-SCNC: 24 MMOL/L (ref 20–31)
COLOR UR: YELLOW
CREAT SERPL-MCNC: 0.7 MG/DL (ref 0.5–0.9)
EOSINOPHIL # BLD: 0.1 K/UL (ref 0–0.44)
EOSINOPHILS RELATIVE PERCENT: 2 % (ref 1–4)
EPI CELLS #/AREA URNS HPF: NORMAL /HPF (ref 0–5)
ERYTHROCYTE [DISTWIDTH] IN BLOOD BY AUTOMATED COUNT: 16.2 % (ref 11.8–14.4)
GFR SERPL CREATININE-BSD FRML MDRD: >60 ML/MIN/1.73M2
GLUCOSE SERPL-MCNC: 124 MG/DL (ref 70–99)
GLUCOSE UR STRIP-MCNC: NEGATIVE MG/DL
HCT VFR BLD AUTO: 39.4 % (ref 36.3–47.1)
HGB BLD-MCNC: 12.9 G/DL (ref 11.9–15.1)
HGB UR QL STRIP.AUTO: NEGATIVE
IMM GRANULOCYTES # BLD AUTO: <0.03 K/UL (ref 0–0.3)
IMM GRANULOCYTES NFR BLD: 0 %
KETONES UR STRIP-MCNC: ABNORMAL MG/DL
LEUKOCYTE ESTERASE UR QL STRIP: ABNORMAL
LIPASE SERPL-CCNC: 22 U/L (ref 13–60)
LYMPHOCYTES NFR BLD: 2.43 K/UL (ref 1.1–3.7)
LYMPHOCYTES RELATIVE PERCENT: 50 % (ref 24–43)
MCH RBC QN AUTO: 31.3 PG (ref 25.2–33.5)
MCHC RBC AUTO-ENTMCNC: 32.7 G/DL (ref 28.4–34.8)
MCV RBC AUTO: 95.6 FL (ref 82.6–102.9)
MONOCYTES NFR BLD: 0.32 K/UL (ref 0.1–1.2)
MONOCYTES NFR BLD: 7 % (ref 3–12)
NEUTROPHILS NFR BLD: 40 % (ref 36–65)
NEUTS SEG NFR BLD: 1.9 K/UL (ref 1.5–8.1)
NITRITE UR QL STRIP: NEGATIVE
NRBC BLD-RTO: 0 PER 100 WBC
PH UR STRIP: 7.5 [PH] (ref 5–8)
PLATELET # BLD AUTO: 309 K/UL (ref 138–453)
PMV BLD AUTO: 10.1 FL (ref 8.1–13.5)
POTASSIUM SERPL-SCNC: 3.8 MMOL/L (ref 3.7–5.3)
PROT SERPL-MCNC: 7.1 G/DL (ref 6.4–8.3)
PROT UR STRIP-MCNC: NEGATIVE MG/DL
RBC # BLD AUTO: 4.12 M/UL (ref 3.95–5.11)
RBC # BLD: ABNORMAL 10*6/UL
RBC #/AREA URNS HPF: NORMAL /HPF (ref 0–4)
SODIUM SERPL-SCNC: 139 MMOL/L (ref 135–144)
SP GR UR STRIP: 1.02 (ref 1–1.03)
UROBILINOGEN UR STRIP-ACNC: NORMAL EU/DL (ref 0–1)
WBC #/AREA URNS HPF: NORMAL /HPF (ref 0–5)
WBC OTHER # BLD: 4.8 K/UL (ref 3.5–11.3)

## 2024-02-12 PROCEDURE — 6370000000 HC RX 637 (ALT 250 FOR IP)

## 2024-02-12 PROCEDURE — 80053 COMPREHEN METABOLIC PANEL: CPT

## 2024-02-12 PROCEDURE — 81001 URINALYSIS AUTO W/SCOPE: CPT

## 2024-02-12 PROCEDURE — 6360000002 HC RX W HCPCS

## 2024-02-12 PROCEDURE — 2580000003 HC RX 258

## 2024-02-12 PROCEDURE — 85025 COMPLETE CBC W/AUTO DIFF WBC: CPT

## 2024-02-12 PROCEDURE — 74177 CT ABD & PELVIS W/CONTRAST: CPT

## 2024-02-12 PROCEDURE — 83690 ASSAY OF LIPASE: CPT

## 2024-02-12 PROCEDURE — 99285 EMERGENCY DEPT VISIT HI MDM: CPT

## 2024-02-12 PROCEDURE — 6360000004 HC RX CONTRAST MEDICATION

## 2024-02-12 PROCEDURE — 96374 THER/PROPH/DIAG INJ IV PUSH: CPT

## 2024-02-12 RX ORDER — CIPROFLOXACIN 500 MG/1
500 TABLET, FILM COATED ORAL 2 TIMES DAILY
Qty: 20 TABLET | Refills: 0 | Status: SHIPPED | OUTPATIENT
Start: 2024-02-12 | End: 2024-02-22

## 2024-02-12 RX ORDER — 0.9 % SODIUM CHLORIDE 0.9 %
1000 INTRAVENOUS SOLUTION INTRAVENOUS ONCE
Status: COMPLETED | OUTPATIENT
Start: 2024-02-12 | End: 2024-02-12

## 2024-02-12 RX ORDER — CIPROFLOXACIN 500 MG/1
500 TABLET, FILM COATED ORAL ONCE
Status: COMPLETED | OUTPATIENT
Start: 2024-02-12 | End: 2024-02-12

## 2024-02-12 RX ORDER — IBUPROFEN 200 MG
600 TABLET ORAL EVERY 8 HOURS PRN
Qty: 30 TABLET | Refills: 0 | Status: SHIPPED | OUTPATIENT
Start: 2024-02-12 | End: 2024-02-22

## 2024-02-12 RX ORDER — METRONIDAZOLE 500 MG/1
500 TABLET ORAL ONCE
Status: COMPLETED | OUTPATIENT
Start: 2024-02-12 | End: 2024-02-12

## 2024-02-12 RX ORDER — KETOROLAC TROMETHAMINE 15 MG/ML
15 INJECTION, SOLUTION INTRAMUSCULAR; INTRAVENOUS ONCE
Status: COMPLETED | OUTPATIENT
Start: 2024-02-12 | End: 2024-02-12

## 2024-02-12 RX ORDER — METRONIDAZOLE 500 MG/1
500 TABLET ORAL 2 TIMES DAILY
Qty: 20 TABLET | Refills: 0 | Status: SHIPPED | OUTPATIENT
Start: 2024-02-12 | End: 2024-02-22

## 2024-02-12 RX ADMIN — METRONIDAZOLE 500 MG: 500 TABLET ORAL at 15:33

## 2024-02-12 RX ADMIN — CIPROFLOXACIN 500 MG: 500 TABLET, FILM COATED ORAL at 15:33

## 2024-02-12 RX ADMIN — IOPAMIDOL 75 ML: 755 INJECTION, SOLUTION INTRAVENOUS at 14:04

## 2024-02-12 RX ADMIN — KETOROLAC TROMETHAMINE 15 MG: 15 INJECTION, SOLUTION INTRAMUSCULAR; INTRAVENOUS at 13:16

## 2024-02-12 RX ADMIN — SODIUM CHLORIDE 1000 ML: 9 INJECTION, SOLUTION INTRAVENOUS at 13:16

## 2024-02-12 NOTE — ED PROVIDER NOTES
[facility]     Emergency Department     Faculty Attestation    I performed a history and physical examination of the patient and discussed management with the resident. I reviewed the resident’s note and agree with the documented findings and plan of care. Any areas of disagreement are noted on the chart. I was personally present for the key portions of any procedures. I have documented in the chart those procedures where I was not present during the key portions. I have reviewed the emergency nurses triage note. I agree with the chief complaint, past medical history, past surgical history, allergies, medications, social and family history as documented unless otherwise noted below. Documentation of the HPI, Physical Exam and Medical Decision Making performed by medical students or scribes is based on my personal performance of the HPI, PE and MDM. For Physician Assistant/ Nurse Practitioner cases/documentation I have personally evaluated this patient and have completed at least one if not all key elements of the E/M (history, physical exam, and MDM). Additional findings are as noted.    Vital signs:   Vitals:    02/12/24 1221   BP: (!) 142/72   Pulse: 72   Resp: 17   Temp: 97.3 °F (36.3 °C)   SpO2: 100%      68-year-old female here with abdominal pain and bloating. The pain is localized over the LLQ. Prior history of multiple abdominal surgeries and bowel obstruction. No fever. No vomiting. On exam, she is afebrile. Her abdomen is soft. She is tender over the LLQ without rebound or guarding. Plan for labs, CT abdomen and pelvis.             Haydee Lyn M.D,  Attending Emergency  Physician            Haydee Lyn MD  02/12/24 1629

## 2024-02-12 NOTE — ED PROVIDER NOTES
[facility] ED  Emergency Department Encounter  Emergency Medicine Resident     Pt Name:Shaila Olea  MRN: [id number]  Birthdate 1955  Date of evaluation: 2/12/24  PCP:  Jesse Kiran DO  Note Started: 12:48 PM EST      CHIEF COMPLAINT       Chief Complaint   Patient presents with    Abdominal Pain    Bloated       HISTORY OF PRESENT ILLNESS  (Location/Symptom, Timing/Onset, Context/Setting, Quality, Duration, Modifying Factors, Severity.)      Shaila Olea is a 68 y.o. female who presents with abdominal pain that began suddenly this afternoon. 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 Patient went to the restroom and began to have significant pain in her lower abdomen and SOB. No associated n/v/d, no blood in stool, CP, SOB, LOC. Patient denies cough, fevers, recent illness     Patient has a known hiatal hernia, recently tested for H. pylori in September 2023, polyp of duodenum    PAST MEDICAL / SURGICAL / SOCIAL / FAMILY HISTORY      has a past medical history of COVID-19 vaccine series completed, Depression, Diverticulitis, Diverticulosis, Dysphagia, GERD (gastroesophageal reflux disease), Glaucoma, H/O mitral valve prolapse, Hiatal hernia, IBS (irritable bowel syndrome), Insulin resistance, Lumbar stenosis, MVP (mitral valve prolapse), Right elbow tendonitis, Sacroiliitis (HCC), Sarcoidosis, Sleep disturbance, Snores, Under care of team, Under care of team, Vitamin D deficiency, Wears glasses, and Wellness examination.       has a past surgical history that includes Tonsillectomy and adenoidectomy (1973); Ovary surgery (Left); Colonoscopy; lymph node biopsy (Right, 11/17/2010); Total abdominal hysterectomy w/ bilateral salpingoophorectomy (04/02/2009); Foot neuroma surgery (Left); pelvic laparoscopy; Esophagogastroduodenoscopy (04/10/2013); Esophagogastroduodenoscopy (04/08/2008); Esophagogastroduodenoscopy

## 2024-02-12 NOTE — DISCHARGE INSTRUCTIONS
You were seen for evaluation of left lower quadrant abdominal pain.  Your CT scan in the emergency department showed that you have diverticulitis of your colon.  This is the inflammation of the diverticulosis in your colon.  You will be discharged home with Cipro and Flagyl which are antibiotics.  You need to take all of the doses of these and do not skip any.  You will also be discharged home with ibuprofen that you can take at home for pain.  Return to the emergency department for any worsening abdominal pain, fevers, chills, inability to eat or drink, other new or concerning symptoms.  Otherwise you need to follow-up outpatient with a primary care doctor soon as possible for reevaluation

## 2024-02-12 NOTE — ED NOTES
Pt to ED via triage a/o x4 with c/o abdominal pain. Pt reports multiple abdominal surgeries. Pt reports hx of diverticulitis. Pt denies any nausea. Pt reports for the last few weeks she has been feeling bloated. Pt reports hx of SBO needing NG tube. Pt denies any chest pain or SOB. Pt reports the pain became so bad today she felt lightheaded and hot. Pt VSS pt call light is in reach

## 2024-02-12 NOTE — ED PROVIDER NOTES
[facility]  FACULTY HANDOFF     4:02 PM EST  Handoff taken on the following patient from prior Attending Physician:  Pt Name: Shaila Olea  PCP:  Jesse Kiran DO    Attestation  I was available and discussed any additional care issues that arose and coordinated the management plans with the resident(s) caring for the patient during my duty period. Any areas of disagreement with resident's documentation of care or procedures are noted on the chart. I was personally present for the key portions of any/all procedures during my duty period. I have documented in the chart those procedures where I was not present during the key portions.         CHIEF COMPLAINT       Chief Complaint   Patient presents with    Abdominal Pain    Bloated         CURRENT MEDICATIONS     Previous Medications  Previous Medications    ALBUTEROL SULFATE HFA (PROVENTIL HFA) 108 (90 BASE) MCG/ACT INHALER    Inhale 1-2 puffs into the lungs every 4 hours as needed for Wheezing or Shortness of Breath (Space out to every 6 hours as symptoms improve) Space out to every 6 hours as symptoms improve.    ALBUTEROL SULFATE  (90 BASE) MCG/ACT INHALER    Inhale 2 puffs into the lungs every 6 hours as needed for Wheezing    BISACODYL 5 MG EC TABLET    Take as directed for bowel prep    CETIRIZINE (ZYRTEC) 10 MG TABLET    Take 1 tablet by mouth daily as needed    CHOLECALCIFEROL (VITAMIN D3) 125 MCG (5000 UT) TABS    Take 1 tablet by mouth daily    CICLOPIROX (PENLAC) 8 % SOLUTION    Apply topically nightly.    CYANOCOBALAMIN (VITAMIN B 12 PO)    Take 3,000 mcg by mouth daily    DICLOFENAC SODIUM (VOLTAREN) 1 % GEL    Apply 4 g topically 4 times daily as needed for Pain    EPINEPHRINE (EPIPEN 2-ANSON) 0.3 MG/0.3ML SOAJ INJECTION    Inject 0.3 mLs into the muscle once for 1 dose Use as directed for allergic reaction    FLUTICASONE (FLONASE) 50 MCG/ACT NASAL SPRAY    2 sprays by Each Nostril route daily    IBUPROFEN

## 2024-02-13 ENCOUNTER — CARE COORDINATION (OUTPATIENT)
Dept: OTHER | Facility: CLINIC | Age: 69
End: 2024-02-13

## 2024-02-13 NOTE — CARE COORDINATION
call 911  [employer] Messaging.   Provided the following associate/dependent related resources: Nurse Access line 24/7 # [phone]    Importance and benefits of: Follow up with PCP and specialist, medication adherence, self monitoring and reporting of symptoms.      Plan:  ACM provided contact information for future needs. Plan for follow-up call in 3-5 days based on severity of symptoms and risk factors.  Plan for next call: Review and update: CC Protocol , goals, and education     Follow up on: symptom management-abdominal pain/nausea/constipation  self management-appetite/nutrition; bowel movements  follow-up appointment-with pcp  Patient  verbalized understanding and is agreeable to follow up call.           Ambulatory Care Coordination Assessment    Care Coordination Protocol  Referral from Primary Care Provider: No  Week 1 - Initial Assessment     Do you have all of your prescriptions and are they filled?: Yes  Barriers to medication adherence: None  Are you able to afford your medications?: Yes  How often do you have trouble taking your medications the way you have been told to take them?: I always take them as prescribed.     Do you have Home O2 Therapy?: No      Ability to seek help/take action for Emergent Urgent situations i.e. fire, crime, inclement weather or health crisis.: Independent  Ability to ambulate to restroom: Independent  Ability handle personal hygeine needs (bathing/dressing/grooming): Independent  Ability to manage Medications: Independent  Ability to prepare Food Preparation: Independent  Ability to maintain home (clean home, laundry): Independent  Ability to drive and/or has transportation: Independent  Ability to do shopping: Independent  Ability to manage finances: Independent  Is patient able to live independently?: Yes     Current Housing: Private Residence                 Thinking about your patient's physical health needs, are there any symptoms or problems (risk indicators) you

## 2024-02-15 ENCOUNTER — HOSPITAL ENCOUNTER (OUTPATIENT)
Age: 69
Setting detail: SPECIMEN
Discharge: HOME | End: 2024-02-15

## 2024-02-15 ENCOUNTER — OFFICE VISIT (OUTPATIENT)
Dept: FAMILY MEDICINE CLINIC | Age: 69
End: 2024-02-15
Payer: COMMERCIAL

## 2024-02-15 VITALS
WEIGHT: 175.8 LBS | DIASTOLIC BLOOD PRESSURE: 71 MMHG | BODY MASS INDEX: 30.01 KG/M2 | HEART RATE: 55 BPM | HEIGHT: 64 IN | SYSTOLIC BLOOD PRESSURE: 134 MMHG

## 2024-02-15 DIAGNOSIS — K44.9 HIATAL HERNIA: ICD-10-CM

## 2024-02-15 DIAGNOSIS — K59.00 CONSTIPATION, UNSPECIFIED CONSTIPATION TYPE: ICD-10-CM

## 2024-02-15 DIAGNOSIS — I05.9 MITRAL VALVE DISORDER: ICD-10-CM

## 2024-02-15 DIAGNOSIS — K57.90 DIVERTICULOSIS: ICD-10-CM

## 2024-02-15 DIAGNOSIS — A64 STI (SEXUALLY TRANSMITTED INFECTION): ICD-10-CM

## 2024-02-15 DIAGNOSIS — Z11.4 SCREENING FOR HIV (HUMAN IMMUNODEFICIENCY VIRUS): ICD-10-CM

## 2024-02-15 DIAGNOSIS — K75.9 HEPATITIS: ICD-10-CM

## 2024-02-15 DIAGNOSIS — R13.10 DYSPHAGIA, UNSPECIFIED TYPE: Primary | ICD-10-CM

## 2024-02-15 LAB
HAV IGM SERPL QL IA: NONREACTIVE
HBV CORE IGM SERPL QL IA: NONREACTIVE
HBV SURFACE AG SERPL QL IA: NONREACTIVE
HCV AB SERPL QL IA: NONREACTIVE
HIV 1+2 AB+HIV1 P24 AG SERPL QL IA: NONREACTIVE

## 2024-02-15 PROCEDURE — 99213 OFFICE O/P EST LOW 20 MIN: CPT

## 2024-02-15 PROCEDURE — 1036F TOBACCO NON-USER: CPT

## 2024-02-15 PROCEDURE — 1090F PRES/ABSN URINE INCON ASSESS: CPT

## 2024-02-15 PROCEDURE — 1123F ACP DISCUSS/DSCN MKR DOCD: CPT

## 2024-02-15 PROCEDURE — 3017F COLORECTAL CA SCREEN DOC REV: CPT

## 2024-02-15 PROCEDURE — G8427 DOCREV CUR MEDS BY ELIG CLIN: HCPCS

## 2024-02-15 PROCEDURE — G8399 PT W/DXA RESULTS DOCUMENT: HCPCS

## 2024-02-15 PROCEDURE — G8484 FLU IMMUNIZE NO ADMIN: HCPCS

## 2024-02-15 PROCEDURE — G8417 CALC BMI ABV UP PARAM F/U: HCPCS

## 2024-02-15 RX ORDER — SENNA AND DOCUSATE SODIUM 50; 8.6 MG/1; MG/1
1 TABLET, FILM COATED ORAL DAILY
Qty: 60 TABLET | Refills: 1 | Status: SHIPPED | OUTPATIENT
Start: 2024-02-15

## 2024-02-15 RX ORDER — OMEPRAZOLE 20 MG/1
20 CAPSULE, DELAYED RELEASE ORAL
Qty: 90 CAPSULE | Refills: 1 | Status: SHIPPED | OUTPATIENT
Start: 2024-02-15

## 2024-02-15 NOTE — PROGRESS NOTES
Visit Information    Have you changed or started any medications since your last visit including any over-the-counter medicines, vitamins, or herbal medicines? no   Have you stopped taking any of your medications? Is so, why? -  no  Are you having any side effects from any of your medications? - no    Have you seen any other physician or provider since your last visit?  no   Have you had any other diagnostic tests since your last visit?  yes - Labs - CT   Have you been seen in the emergency room and/or had an admission in a hospital since we last saw you?  yes - [place]    Have you had your routine dental cleaning in the past 6 months?  no     Do you have an active MyChart account? If no, what is the barrier?  Yes    Patient Care Team:  Jesse Kiran DO as PCP - General  [facility] Kiran [facility]  Nayla Gil, RN as Ambulatory Care Manager    Medical History Review  Past Medical, Family, and Social History reviewed and does not contribute to the patient presenting condition    Health Maintenance   Topic Date Due    Hepatitis C screen  Never done    DTaP/Tdap/Td vaccine (1 - Tdap) Never done    Shingles vaccine (1 of 2) Never done    Respiratory Syncytial Virus (RSV) Pregnant or age 60 yrs+ (1 - 1-dose 60+ series) Never done    Pneumococcal 65+ years Vaccine (1 - PCV) Never done    Breast cancer screen  07/07/2023    COVID-19 Vaccine (4 - 2023-24 season) 09/01/2023    A1C test (Diabetic or Prediabetic)  10/25/2023    Lipids  07/16/2024    Depression Screen  01/03/2025    Colorectal Cancer Screen  09/01/2033    DEXA (modify frequency per FRAX score)  Completed    Flu vaccine  Completed    Hepatitis A vaccine  Aged Out    Hepatitis B vaccine  Aged Out    Hib vaccine  Aged Out    Polio vaccine  Aged Out    Meningococcal (ACWY) vaccine  Aged Out

## 2024-02-15 NOTE — PROGRESS NOTES
Attending Physician Statement  I have discussed the care of ReginaDowningincluding pertinent history and exam findings,  with the resident. I have reviewed the key elements of all parts of the encounter with the resident.  I agree with the assessment, plan and orders as documented by the resident.  (GE Modifier)    Constipation- continue with Miralax  EGD- Gastritis with Hiatal hernia/GI referral- Adenomatous polyp  Rheumatic HD- Cardiology referral

## 2024-02-15 NOTE — PROGRESS NOTES
[facility] Residency Program - Outpatient Note      Subjective:    Shaila Olea is a 68 y.o. female with  has a past medical history of COVID-19 vaccine series completed, Depression, Diverticulitis, Diverticulosis, Dysphagia, GERD (gastroesophageal reflux disease), Glaucoma, H/O mitral valve prolapse, Hiatal hernia, IBS (irritable bowel syndrome), Insulin resistance, Lumbar stenosis, MVP (mitral valve prolapse), Right elbow tendonitis, Sacroiliitis (HCC), Sarcoidosis, Sleep disturbance, Snores, Under care of team, Under care of team, Vitamin D deficiency, Wears glasses, and Wellness examination.    Presented to the office today for:  Chief Complaint   Patient presents with    Follow-up     [facility] pain - less pain     Health Maintenance     Decline vaccines       HPI  68-year-old female is here to follow-up after her ER visit    2/12/2014 -ER visit for abdominal pain, CT showed diverticulosis and signs of early diverticulitis, recommended considering follow-up colonoscopy if not recently performed. patient did not have fever, vomiting.  Patient was discharged with ciprofloxacin and metronidazole   Today patient reports symptoms are improving but she is still constipated.    EGD:  1-2 cm hiatal hernia identified between 36 to 38 cm, strictures or obstructive lesions.  No evidence of esophagitis  2-mild scattered areas of erythema identified in the gastric body, cold biopsy taken to evaluate for H. pylori  3-small proximal duodenal bulb polyp, 6mm, versus Brunner's gland hyperplasia, cold biopsy taken for Histopath evaluation    Colonoscopy  1-pedunculated polypoid lesion identified in the sigmoid colon, 9 mm status post hot snare polypectomy removal  2-moderate left-sided diverticulosis  3-hepatic flexure polyp, 8 mm status post hot snare polypectomy and removal  4-moderate external hemorrhoids, small internal hemorrhoids    Repeat Colonoscopy in 5 yrs. Follow up

## 2024-02-16 LAB
ALBUMIN SERPL-MCNC: 4.4 G/DL (ref 3.5–5.2)
ALBUMIN/GLOB SERPL: 1.7 {RATIO} (ref 1–2.5)
ALP SERPL-CCNC: 81 U/L (ref 35–104)
ALT SERPL-CCNC: 26 U/L (ref 5–33)
AST SERPL-CCNC: 25 U/L
BILIRUB DIRECT SERPL-MCNC: <0.1 MG/DL
BILIRUB INDIRECT SERPL-MCNC: NORMAL MG/DL (ref 0–1)
BILIRUB SERPL-MCNC: 0.3 MG/DL (ref 0.3–1.2)
CHLAMYDIA DNA UR QL NAA+PROBE: NEGATIVE
N GONORRHOEA DNA UR QL NAA+PROBE: NEGATIVE
PROT SERPL-MCNC: 7 G/DL (ref 6.4–8.3)
SPECIMEN DESCRIPTION: NORMAL
T PALLIDUM AB SER QL IA: NONREACTIVE

## 2024-02-19 ENCOUNTER — TELEPHONE (OUTPATIENT)
Dept: FAMILY MEDICINE CLINIC | Age: 69
End: 2024-02-19

## 2024-02-19 ENCOUNTER — CARE COORDINATION (OUTPATIENT)
Dept: OTHER | Facility: CLINIC | Age: 69
End: 2024-02-19

## 2024-02-19 DIAGNOSIS — K13.0 LESION OF LIP: Primary | ICD-10-CM

## 2024-02-19 NOTE — TELEPHONE ENCOUNTER
Patient called requesting referral to Infectious Disease for Dr. Vivas. Patient reports having something on her lips. Patient called to schedule appt with them, and they told her she will need the referral.

## 2024-02-19 NOTE — CARE COORDINATION
Ambulatory Care Coordination Note  2024    Patient Current Location:  [facility] contacted the patient by telephone. Verified name and  with patient as identifiers. Patient states she is driving and will return call to [facility].   Will continue to outreach.          Future Appointments   Date Time Provider Department Center   3/20/2024  3:30 PM Jesse Kiran DO [id number]     Eloise Gil MSN, RN   Ambulatory Care Manager  Associate Care Management  Cell [phone]  [email or web address]

## 2024-02-20 LAB
HSV1 IGG SERPL QL IA: 5.02
HSV1+2 IGM SER QL IA: 0.72
HSV2 AB SER QL IA: 9.22

## 2024-02-21 ENCOUNTER — TELEPHONE (OUTPATIENT)
Dept: SURGERY | Age: 69
End: 2024-02-21

## 2024-02-21 ENCOUNTER — CARE COORDINATION (OUTPATIENT)
Dept: OTHER | Facility: CLINIC | Age: 69
End: 2024-02-21

## 2024-02-21 ASSESSMENT — SOCIAL DETERMINANTS OF HEALTH (SDOH): HOW HARD IS IT FOR YOU TO PAY FOR THE VERY BASICS LIKE FOOD, HOUSING, MEDICAL CARE, AND HEATING?: SOMEWHAT HARD

## 2024-02-21 NOTE — TELEPHONE ENCOUNTER
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 I placed an ID referral as per her message with Dr. Dick. Please advise me back if she has any questions.      Please advise patient.

## 2024-02-21 NOTE — CARE COORDINATION
Educated pt on importance of f/u appts. Pt agreeable to ACM contacting Cardiology and Gastro offices to schedule f/u appts.    When to Call the Doctor, taught by Nayla Gil, RN at 2/22/2024  7:38 AM.  Learner: Patient  Readiness: Acceptance  Method: Explanation  Response: Verbalizes Understanding  Comment: ACM educated pt on red flags/appropriate sites of care, including PCP, specialist, nurse access line, when to go to urgent care/ED and when to call 911 for life threatening emergencies and pt verbalized understanding.    Diet Instruction, taught by Nayla Gil, RN at 2/22/2024  7:38 AM.  Learner: Patient  Readiness: Acceptance  Method: Explanation  Response: Verbalizes Understanding  Comment: Educated pt on diet high in fiber and adequate water/decaf product intake to assist with constipation issues.    Activity, taught by Nayla Gil, RN at 2/22/2024  7:36 AM.  Learner: Patient  Readiness: Acceptance  Method: Explanation  Response: Verbalizes Understanding  Comment: Educated pt on importance of daily exercise, which will help with constipation issues.    Education Comments  No comments found.          Eloise Gil MSN, RN   Ambulatory Care Manager  Associate Care Management  Cell [phone]  [email or web address]

## 2024-02-21 NOTE — TELEPHONE ENCOUNTER
Spoke with patient in regards to her lab result and referral. Patient said she was pretty certain on her lab result, and will call Infectious Disease today to schedule.

## 2024-02-22 ENCOUNTER — CARE COORDINATION (OUTPATIENT)
Dept: OTHER | Facility: CLINIC | Age: 69
End: 2024-02-22

## 2024-02-22 NOTE — CARE COORDINATION
MSW contacted patient for evaluation .    Referral sent to  by Nurse SUDARSHAN, Nayla Gil, RN.        Purpose of TC  MSW received referral reporting patient w/financial hardships related to: medical bills and utilities. Patient potentially also interested in ACP discussion as well as discussing potential resources for grandson with Autism.          Coverage:  Per chart review, patient with primary UMR; secondary Medicare Part A & B.         Support  Per chart review, patient active with counseling; SUDARSHAN RN additionally provided information on [employer] [employer] to patient.       Plan of action  MSW unable to reach, discreet voicemail left requesting return call.     MSW to await return call; MSW to attempt outreach again.

## 2024-02-29 ENCOUNTER — CARE COORDINATION (OUTPATIENT)
Dept: OTHER | Facility: CLINIC | Age: 69
End: 2024-02-29

## 2024-02-29 NOTE — CARE COORDINATION
MSW contacted patient for evaluation .   Referral sent to  by Nurse SUDARSHAN, Nayla Gil, RN.        Purpose of TC  MSW received referral reporting patient w/financial hardships related to: medical bills and utilities. Patient potentially also interested in ACP discussion as well as discussing potential resources for grandson with Autism.        Plan of action  MSW unable to reach, x2 attempts, unable to leave voicemail: mailbox is full.    MSW to await return call; MSW to attempt outreach again.

## 2024-03-05 ENCOUNTER — CARE COORDINATION (OUTPATIENT)
Dept: OTHER | Facility: CLINIC | Age: 69
End: 2024-03-05

## 2024-03-05 NOTE — CARE COORDINATION
Ambulatory Care Coordination Note    ACM attempted to reach patient for care management follow up call regarding abdominal pain/diverticulitis/fu with Gastro. HIPAA compliant message left requesting a return phone call at patient convenience.     Plan for follow up call to pt in 1 week.    Future Appointments   Date Time Provider Department Center   3/20/2024  3:00 PM Ermelinda Vivas MD INFT DISEASE [facility]   5/30/2024 11:15 AM Michael Driver MD [facility] ANIL Gil MSN, RN   Ambulatory Care Manager  Associate Care Management  Cell [phone]  [email or web address]

## 2024-03-06 ENCOUNTER — CARE COORDINATION (OUTPATIENT)
Dept: OTHER | Facility: CLINIC | Age: 69
End: 2024-03-06

## 2024-03-06 NOTE — CARE COORDINATION
MSW contacted patient for evaluation .  Referral sent to  by Nurse SUDARSHAN, Nayla Gil RN.        Purpose of TC  MSW received referral reporting patient w/financial hardships related to: medical bills and utilities. Patient potentially also interested in ACP discussion as well as discussing potential resources for grandson with Autism.  Request for outreaches at 12pm.    Plan of action  MSW unable to reach, x3 attempts, unable to leave voicemail due to full mailbox.    MSW to await return call; MSW to sign off EOD if return call not received.    MSW remains available to assist as needed, please re-refer as needed.     ACM provided contact information for future needs. No further follow-up call indicated

## 2024-03-11 ENCOUNTER — CARE COORDINATION (OUTPATIENT)
Dept: OTHER | Facility: CLINIC | Age: 69
End: 2024-03-11

## 2024-03-11 NOTE — CARE COORDINATION
Ambulatory Care Coordination Note    ACM attempted to reach patient for care management follow up call regarding appt with Gastro. Unable to leave HIPAA compliant message as pt voice mailbox was full. Lost to follow up  letter sent to pt via my chart.     Plan for follow up outreach in 2-3 weeks    Future Appointments   Date Time Provider Department Center   3/20/2024  3:00 PM Ermelinda Vivas MD INFT DISEASE [facility]   5/30/2024 11:15 AM Michael Driver MD [facility] Debbie Pifher MSN, RN   Ambulatory Care Manager  Associate Care Management  Cell [phone]  [email or web address]

## 2024-03-12 ENCOUNTER — TELEPHONE (OUTPATIENT)
Dept: FAMILY MEDICINE CLINIC | Age: 69
End: 2024-03-12

## 2024-03-15 ENCOUNTER — PATIENT MESSAGE (OUTPATIENT)
Dept: PRIMARY CARE CLINIC | Age: 69
End: 2024-03-15

## 2024-03-15 NOTE — TELEPHONE ENCOUNTER
Completed scanned into chart with confirmation. Faxed back to [employer] on 3/15/2024. Patient was informed.
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
## 2024-03-20 ENCOUNTER — OFFICE VISIT (OUTPATIENT)
Dept: INFECTIOUS DISEASES | Age: 69
End: 2024-03-20
Payer: COMMERCIAL

## 2024-03-20 VITALS
TEMPERATURE: 97.2 F | SYSTOLIC BLOOD PRESSURE: 136 MMHG | DIASTOLIC BLOOD PRESSURE: 74 MMHG | WEIGHT: 173 LBS | HEIGHT: 64 IN | BODY MASS INDEX: 29.53 KG/M2 | HEART RATE: 77 BPM

## 2024-03-20 DIAGNOSIS — Z86.19: ICD-10-CM

## 2024-03-20 DIAGNOSIS — K13.0 LIP ULCER: Primary | ICD-10-CM

## 2024-03-20 PROCEDURE — 99204 OFFICE O/P NEW MOD 45 MIN: CPT | Performed by: INTERNAL MEDICINE

## 2024-03-20 PROCEDURE — 1123F ACP DISCUSS/DSCN MKR DOCD: CPT | Performed by: INTERNAL MEDICINE

## 2024-03-20 RX ORDER — ACYCLOVIR 400 MG/1
400 TABLET ORAL 2 TIMES DAILY
Qty: 20 TABLET | Refills: 0 | Status: SHIPPED | OUTPATIENT
Start: 2024-03-20 | End: 2024-03-30

## 2024-03-20 ASSESSMENT — ENCOUNTER SYMPTOMS
EYE DISCHARGE: 0
COLOR CHANGE: 0
APNEA: 0
ABDOMINAL DISTENTION: 0

## 2024-03-20 NOTE — PROGRESS NOTES
supple. No rigidity.   Skin:     Coloration: Skin is not jaundiced.      Findings: No bruising.   Neurological:      Mental Status: She is alert.      Cranial Nerves: No cranial nerve deficit.   Psychiatric:         Mood and Affect: Mood normal.         Thought Content: Thought content normal.           Medical Decision Making:   I have independently reviewed/ordered the following labs:    CBCwith Differential:   Lab Results   Component Value Date/Time    WBC 4.8 02/12/2024 01:10 PM    WBC 8.8 10/07/2023 10:04 PM    HGB 12.9 02/12/2024 01:10 PM    HGB 13.1 10/07/2023 10:04 PM    HCT 39.4 02/12/2024 01:10 PM    HCT 37.6 10/07/2023 10:04 PM     02/12/2024 01:10 PM     10/07/2023 10:04 PM    LYMPHOPCT 50 02/12/2024 01:10 PM    LYMPHOPCT 12 10/07/2023 10:04 PM    MONOPCT 7 02/12/2024 01:10 PM    MONOPCT 4 10/07/2023 10:04 PM     BMP:  Lab Results   Component Value Date/Time     02/12/2024 01:10 PM     10/07/2023 10:04 PM    K 3.8 02/12/2024 01:10 PM    K 4.2 10/07/2023 10:04 PM     02/12/2024 01:10 PM     10/07/2023 10:04 PM    CO2 24 02/12/2024 01:10 PM    CO2 27 10/07/2023 10:04 PM    BUN 16 02/12/2024 01:10 PM    BUN 19 10/07/2023 10:04 PM    CREATININE 0.7 02/12/2024 01:10 PM    CREATININE 0.9 10/07/2023 10:04 PM    MG 1.9 10/07/2023 10:04 PM    MG 2.0 02/04/2014 06:26 AM     Hepatic Function Panel:   Lab Results   Component Value Date/Time    PROT 7.0 02/15/2024 01:24 PM    PROT 7.1 02/12/2024 01:10 PM    LABALBU 4.4 02/15/2024 01:24 PM    LABALBU 4.4 02/12/2024 01:10 PM    BILIDIR <0.1 02/15/2024 01:24 PM    BILIDIR 0.08 02/03/2014 09:15 AM    IBILI Can not be calculated 02/15/2024 01:24 PM    IBILI 0.24 02/03/2014 09:15 AM    BILITOT 0.3 02/15/2024 01:24 PM    BILITOT 1.8 02/12/2024 01:10 PM    ALKPHOS 81 02/15/2024 01:24 PM    ALKPHOS 85 02/12/2024 01:10 PM    ALT 26 02/15/2024 01:24 PM    ALT 19 02/12/2024 01:10 PM    AST 25 02/15/2024 01:24 PM    AST 17 02/12/2024 01:10 PM

## 2024-03-27 ENCOUNTER — CARE COORDINATION (OUTPATIENT)
Dept: OTHER | Facility: CLINIC | Age: 69
End: 2024-03-27

## 2024-03-27 NOTE — CARE COORDINATION
Ambulatory Care Coordination Note  3/27/2024    Patient Current Location:  [place]     ACM contacted the patient by telephone. Verified name and  with patient as identifiers.     Challenges to be reviewed by the provider   Additional needs identified to be addressed with provider: No  none               Method of communication with provider: none.    ACM: Nayla Gil RN    ACM contacted the patient to follow up on progress, discuss new issues or concerns, and reinforce/provide patient education.     Summary Note: ACM contacted pt for follow up care management call. Pt reports she is on break at work currently. Pt reports abdominal pain is improved; states pain is 1-2/10; taking ibuprofen intermittently with relief. Patient educated to refrain from exceeding  3200 mg of ibuprofen in 24 hour period. Patient verbalized understanding.  Pt denies any fever or nausea/vomiting. Pt states she does have hx of difficulties swallowing; educated pt to take small bites, chew food thoroughly and to drink sips of water with meals. Educated on adequate water/decaf products intake.   Pt states she has problems with constipation and is taking stool softener/laxative, which helps with bowel movements. Discussed foods, hydration and ambulation to increase GI motility.    Pt reports she spoke with Dr. Meneses, Gastro office and states that office staff said they are unable to schedule appt with provider and that pt needs to speak with . Pt states she has attempted to contact  a few times, without response. Pt states she wants to find another Gastro provider. ACM pulled listing of in network Gastro providers from [facility] site and sent to pt via my chart. ACM offered support of  to assist pt in scheduling with Gastro provider. Pt will look at listing and notify ACM if needs assistance is scheduling with new Gastro provider.   ACM discussed with pt that  had attempted outreach X3 to

## 2024-04-03 ENCOUNTER — CARE COORDINATION (OUTPATIENT)
Dept: OTHER | Facility: CLINIC | Age: 69
End: 2024-04-03

## 2024-04-03 NOTE — CARE COORDINATION
Ambulatory Care Coordination Note  4/3/2024    Patient Current Location:  [place]     ACM contacted the patient by telephone. Verified name and  with patient as identifiers.     Challenges to be reviewed by the provider   Additional needs identified to be addressed with provider: No  none               Method of communication with provider: none.    ACM: Nayla Gil RN    ACM contacted the patient to follow up on progress, discuss new issues or concerns, and reinforce/provide patient education.     Summary Note: Acm spoke with patient for care management follow up call. Pt states she has not had any further abdominal pain or nausea/vomiting. Pt reports she has been trying to include salads and fruit with lunches at work and has decreased caffeine intake; encouraged adequate water intake; pt states she is not drinking enough water. Pt states she has had increased gas/abdominal distention and states she is constipated; reports has 1-2 hard bowel movements per day. Discussed foods, hydration and ambulation to increase GI motility.    Pt states she completed 10 day course of acyclovir, but did not seem to help with mouth sores. ACM reviewed Dr. Vivas notes form 3/20/24 visit; informed pt she was to notify Dr. Vivas on if the acyclovir helped the sores and if provider will place referral to ENT. Pt states she doesn't want to have to go to another provider, as she continues to deal with stress of divorce from spouse. ACM encouraged pt to contact Dr. Vera, as mouth sores could be something more serious; pt agreeable to contacting Dr. Vera by end of week.   Pt states she is working with BH therapist and states she has been using breathing exercises and meditation to help with anxiety over situation with  and states she is also sleeping better.   Pt states she has not looked at listing of in network Gastro providers that ACM sent to pt via my chart. Encouraged pt to review and ACM offered to call provider that

## 2024-04-17 ENCOUNTER — CARE COORDINATION (OUTPATIENT)
Dept: OTHER | Facility: CLINIC | Age: 69
End: 2024-04-17

## 2024-04-17 NOTE — CARE COORDINATION
Ambulatory Care Coordination Note  2024    Patient Current Location:  [place]     AC contacted the patient by telephone. Verified name and  with patient as identifiers. Pt states she is currently at work and is busy; states she will return call to ACM at a later time.    Will plan to outreach in 2 weeks.      Future Appointments   Date Time Provider Department Center   2024  3:00 PM [facility]   2024 11:15 AM Michael Driver MD [facility] Debbie Pifher MSN, RN   Ambulatory Care Manager  Associate Care Management  Cell [phone]  [email or web address]

## 2024-04-24 ENCOUNTER — HOSPITAL ENCOUNTER (EMERGENCY)
Age: 69
Discharge: HOME | End: 2024-04-24
Attending: EMERGENCY MEDICINE
Payer: COMMERCIAL

## 2024-04-24 VITALS
OXYGEN SATURATION: 100 % | DIASTOLIC BLOOD PRESSURE: 65 MMHG | RESPIRATION RATE: 17 BRPM | HEART RATE: 60 BPM | SYSTOLIC BLOOD PRESSURE: 162 MMHG | TEMPERATURE: 97.8 F | BODY MASS INDEX: 29.7 KG/M2 | HEIGHT: 64 IN

## 2024-04-24 DIAGNOSIS — Z20.6 HIV EXPOSURE FROM BODY FLUIDS: Primary | ICD-10-CM
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
## 2024-04-24 LAB
ALBUMIN SERPL-MCNC: 4.4 G/DL (ref 3.5–5.2)
ALBUMIN/GLOB SERPL: 1.6 {RATIO} (ref 1–2.5)
ALP SERPL-CCNC: 86 U/L (ref 35–104)
ALT SERPL-CCNC: 20 U/L (ref 5–33)
ANION GAP SERPL CALCULATED.3IONS-SCNC: 9 MMOL/L (ref 9–17)
AST SERPL-CCNC: 16 U/L
BILIRUB SERPL-MCNC: 0.3 MG/DL (ref 0.3–1.2)
BUN SERPL-MCNC: 17 MG/DL (ref 8–23)
CALCIUM SERPL-MCNC: 9.7 MG/DL (ref 8.6–10.4)
CHLORIDE SERPL-SCNC: 107 MMOL/L (ref 98–107)
CO2 SERPL-SCNC: 28 MMOL/L (ref 20–31)
CREAT SERPL-MCNC: 0.8 MG/DL (ref 0.5–0.9)
GFR SERPL CREATININE-BSD FRML MDRD: 80 ML/MIN/1.73M2
GLUCOSE SERPL-MCNC: 93 MG/DL (ref 70–99)
POTASSIUM SERPL-SCNC: 4.1 MMOL/L (ref 3.7–5.3)
PROT SERPL-MCNC: 7.1 G/DL (ref 6.4–8.3)
SODIUM SERPL-SCNC: 144 MMOL/L (ref 135–144)

## 2024-04-24 PROCEDURE — 6370000000 HC RX 637 (ALT 250 FOR IP)

## 2024-04-24 PROCEDURE — 80053 COMPREHEN METABOLIC PANEL: CPT

## 2024-04-24 PROCEDURE — 99283 EMERGENCY DEPT VISIT LOW MDM: CPT

## 2024-04-24 RX ORDER — BICTEGRAVIR SODIUM, EMTRICITABINE, AND TENOFOVIR ALAFENAMIDE FUMARATE 50; 200; 25 MG/1; MG/1; MG/1
1 TABLET ORAL DAILY
Qty: 30 TABLET | Refills: 0 | Status: SHIPPED | OUTPATIENT
Start: 2024-04-24

## 2024-04-24 RX ADMIN — BICTEGRAVIR SODIUM, EMTRICITABINE, AND TENOFOVIR ALAFENAMIDE FUMARATE 1 TABLET: 50; 200; 25 TABLET ORAL at 21:49

## 2024-04-24 ASSESSMENT — PAIN - FUNCTIONAL ASSESSMENT: PAIN_FUNCTIONAL_ASSESSMENT: 0-10

## 2024-04-24 ASSESSMENT — PAIN SCALES - GENERAL: PAINLEVEL_OUTOF10: 0

## 2024-04-24 NOTE — ED PROVIDER NOTES
[facility] EMERGENCY DEPARTMENT  Emergency Department Encounter  Mid Level Provider     Pt Name: Shaila Olea  MRN: [id number]  Birthdate 1955  Date of evaluation: 4/24/24  PCP:  Jesse Kiran DO    CHIEF COMPLAINT       Chief Complaint   Patient presents with    Body Fluid Exposure       HISTORY OF PRESENT ILLNESS  (Location/Symptom, Timing/Onset,Context/Setting, Quality, Duration, Modifying Factors, Severity.)      Shaila Olea is a 68 y.o. 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 Rapid HIV was drawn from source patient and reported positive. Patient presents for evaluation and PEP therapy. She denies history of CAD, CKD, COPD, Diabetes     PAST MEDICAL /SURGICAL / SOCIAL / FAMILY HISTORY      has a past medical history of COVID-19 vaccine series completed, Depression, Diverticulitis, Diverticulosis, Dysphagia, GERD (gastroesophageal reflux disease), Glaucoma, H/O mitral valve prolapse, Herpes, Hiatal hernia, IBS (irritable bowel syndrome), Insulin resistance, Lumbar stenosis, MVP (mitral valve prolapse), Right elbow tendonitis, Sacroiliitis (HCC), Sarcoidosis, Sleep disturbance, Snores, Under care of team, Under care of team, Vitamin D deficiency, Wears glasses, and Wellness examination.     has a past surgical history that includes Tonsillectomy and adenoidectomy (1973); Ovary surgery (Left); Colonoscopy; lymph node biopsy (Right, 11/17/2010); Total abdominal hysterectomy w/ bilateral salpingoophorectomy (04/02/2009); Foot neuroma surgery (Left); pelvic laparoscopy; Esophagogastroduodenoscopy (04/10/2013); Esophagogastroduodenoscopy (04/08/2008); Esophagogastroduodenoscopy (02/06/2014); Foot surgery (Right, 08/29/2017); Foot surgery (Right, 06/19/2018); Colonoscopy (09/01/2023); Esophagogastroduodenoscopy (09/01/2023); Colonoscopy (N/A, 9/1/2023); and Upper gastrointestinal endoscopy (N/A,  bleeding noted.    Neurological:      General: No focal deficit present.      Mental Status: She is alert and oriented to person, place, and time.   Psychiatric:         Mood and Affect: Mood normal.         Behavior: Behavior normal.         EMERGENCY DEPARTMENT COURSE AND MEDICAL DECISION MAKING     DIFFERENTIAL DIAGNOSIS        ED COURSE:    ED Course as of 04/24/24 2144 Wed Apr 24, 2024 1952 I spoke with the postexposure prophylaxis hotline who recommended that the patient be initiated on PEP for 28 days with Biktarvy.  Did recommend that CMP be obtained prior to initiation to rule out decreased kidney function []   2120 CMP without evidence of renal dysfunction, PEP medication ordered and awaiting arrival from in patient unit medication room.  []      ED Course User Index  [] Melchor Person, APRN - CNP       Medical Decision Making:    This patient was seen and evaluated in the Emergency Department for evaluation status-post . History of present illness and ED course notes above detail the events of their care. After detailed history, physical examination, and work-up with pertinent findings of CMP without evidence of renal dysfunction were found. The patient was started on PEP therapy.     The patient was discharged home with self care and oral PEP therapy . They were given detailed discharge instructions of adherence to the medication regimen and close follow up with occupational medicine for further evaluation and management . As well as Specific return instructions of chest pain, shortness of breath, signs or symptoms of superficial infection from exposure site .     Any questions or concerns were addressed by myself and/or my attending physician who was present and available during their course prior to leaving the emergency department.      Plan (Labs/Imaging/EKG):  Orders Placed This Encounter   Procedures    Comprehensive Metabolic Panel       Medications Ordered:  Orders Placed This Encounter

## 2024-04-24 NOTE — ED PROVIDER NOTES
post-exposure prophylaxis or HIV.  We have contacted [facility] hotline to determine the most current recommendations for medication.  Baseline blood levels were obtained here.  The patient is asked to follow-up with her occupational physician.  She is discharged in good condition.     Virginia Melendez MD  04/24/24 0064

## 2024-04-25 NOTE — ED TRIAGE NOTES
Attempted to call patient back to triage, patient in lab and stated she wanted to speak to hotline to figure out what steps she should be taking for incident.    Will reassess in 15 min.

## 2024-04-25 NOTE — DISCHARGE INSTRUCTIONS
Please  your prescription for the prophylactic medication you will need to arrange for close follow-up with occupational health.  If you have any questions regarding your exposure or the medications you are free to call the postexposure prophylaxis hotline at [phone]    Please return to the emergency department if you develop any chest pain, shortness of breath, abdominal pain, nausea, vomiting in excess, fevers, chills, headaches or vision changes.

## 2024-05-01 ENCOUNTER — CARE COORDINATION (OUTPATIENT)
Dept: OTHER | Facility: CLINIC | Age: 69
End: 2024-05-01

## 2024-05-01 NOTE — CARE COORDINATION
ACM contacted the patient to follow up on progress, discuss new issues or concerns, and reinforce/provide patient education.     Summary Note: Patient states she has not had a chance to look for a GI provider, as she had a needle stick at work a few days ago, states patient is HIV positive.  She is undergoing treatment at this time.  She stated that she can't believe this happened after 42 years but knows that God will take care of her.  Offered assistance, she declined.  States she will find a provider in the next few weeks and requested a call back in about three weeks or so.    Reinforced/Provided Education:  Discussed red flags and appropriate site of care based on symptoms and resources available to patient including: Specialist.       Importance and benefits of: Follow up with PCP and specialist, medication adherence, self monitoring and reporting of symptoms.      Plan:  Outreach  based on severity of symptoms and risk factors.  Plan for next call:  Call in about three weeks.    Patient  verbalized understanding and is agreeable to follow up call.

## 2024-05-06 ENCOUNTER — OFFICE VISIT (OUTPATIENT)
Dept: INFECTIOUS DISEASES | Age: 69
End: 2024-05-06
Payer: COMMERCIAL

## 2024-05-06 VITALS
SYSTOLIC BLOOD PRESSURE: 123 MMHG | RESPIRATION RATE: 20 BRPM | DIASTOLIC BLOOD PRESSURE: 64 MMHG | HEART RATE: 76 BPM | HEIGHT: 64 IN | TEMPERATURE: 97 F | BODY MASS INDEX: 30.8 KG/M2 | WEIGHT: 180.4 LBS

## 2024-05-06 DIAGNOSIS — K13.79 RECURRENT ORAL ULCERS: Primary | ICD-10-CM

## 2024-05-06 PROCEDURE — 1123F ACP DISCUSS/DSCN MKR DOCD: CPT | Performed by: INTERNAL MEDICINE

## 2024-05-06 PROCEDURE — 99214 OFFICE O/P EST MOD 30 MIN: CPT | Performed by: INTERNAL MEDICINE

## 2024-05-06 ASSESSMENT — ENCOUNTER SYMPTOMS
COLOR CHANGE: 0
APNEA: 0
EYE PAIN: 0
EYE REDNESS: 0
EYE DISCHARGE: 0
ABDOMINAL DISTENTION: 0

## 2024-05-06 NOTE — PROGRESS NOTES
Not Currently     Partners: Male   Other Topics Concern    Not on file   Social History Narrative    Not on file     Social Determinants of Health     Financial Resource Strain: Medium Risk (2/21/2024)    Overall Financial Resource Strain (CARDIA)     Difficulty of Paying Living Expenses: Somewhat hard   Food Insecurity: Not on file (9/15/2023)   Transportation Needs: Unknown (9/15/2023)    PRAPARE - Transportation     Lack of Transportation (Medical): Not on file     Lack of Transportation (Non-Medical): No   Physical Activity: Not on file   Stress: Not on file   Social Connections: Not on file   Intimate Partner Violence: Not on file   Housing Stability: Unknown (9/15/2023)    Housing Stability Vital Sign     Unable to Pay for Housing in the Last Year: Not on file     Number of Places Lived in the Last Year: Not on file     Unstable Housing in the Last Year: No       Family History:     Family History   Problem Relation Age of Onset    Breast Cancer Sister     Diabetes Sister     Diabetes Mother     Hypertension Mother     Tuberculosis Mother     Obesity Mother     Heart Disease Father     Arthritis Father     Diabetes Sister     Diabetes Sister     Colon Cancer Maternal Grandmother     Stroke Other     Kidney Disease Other         Allergies:   Latex, Cat hair extract, Dust mite extract, Molds & smuts, Codeine, Eggs or egg-derived products [egg-derived products], and Percocet [oxycodone-acetaminophen]     Review of Systems:   Review of Systems   Constitutional:  Negative for activity change.   HENT:  Positive for mouth sores. Negative for congestion.    Eyes:  Negative for pain, discharge and redness.   Respiratory:  Negative for apnea.    Cardiovascular:  Negative for chest pain.   Gastrointestinal:  Negative for abdominal distention.   Endocrine: Negative for cold intolerance.   Genitourinary:  Negative for dysuria.   Musculoskeletal:  Positive for arthralgias.   Skin:  Negative for color change.

## 2024-05-07 ENCOUNTER — HOSPITAL ENCOUNTER (OUTPATIENT)
Age: 69
Setting detail: SPECIMEN
Discharge: HOME | End: 2024-05-07

## 2024-05-14 LAB — SURGICAL PATHOLOGY REPORT: NORMAL

## 2024-05-15 ENCOUNTER — TELEPHONE (OUTPATIENT)
Dept: OTOLARYNGOLOGY | Age: 69
End: 2024-05-15

## 2024-05-15 NOTE — TELEPHONE ENCOUNTER
Phoned Ms Olea to discuss the results of her biopsy per Dr. Reinoso request.  I reviewed with her that there is no evidence of malignant or premalignant pathology.  They do not appreciate any infectious processes. Dr. Vivas was also sent this information in case she has any additional recommendations.  She denies any further questions and agrees that she will call the office for follow up for any other concerns that may arise.

## 2024-05-22 ENCOUNTER — CARE COORDINATION (OUTPATIENT)
Dept: OTHER | Facility: CLINIC | Age: 69
End: 2024-05-22

## 2024-05-22 NOTE — CARE COORDINATION
ACM contacted the patient to follow up on progress, discuss new issues or concerns, and reinforce/provide patient education.     Summary Note: Patient states she is doing okay.  States her biopsies of mouth leisions came back negative.  Discussed strong family history of pancreatic cancer, as well as colon cancer.  Encouraged patient to schedule an appointment with gastroenterology.   She states she has been having a lot of labs due to needle stick while at work.  She will prioritize this again.     Emailed patient provider list per her request.    Reinforced/Provided Education:  Discussed red flags and appropriate site of care based on symptoms and resources available to patient including: Specialist  Benefits related nurse triage line.   Provided the following associate/dependent related resources: Be Well- [email or web address]  [facility]: [phone] (Questions-0, Refills-1)    Nurse Access line 24/7 # [phone]    Importance and benefits of: Follow up with PCP and specialist, medication adherence, self monitoring and reporting of symptoms.      Plan:  Plan for follow-up call in 10-14 days based on severity of symptoms and risk factors.  Plan for next call: symptom management-mouth sores - resolved?    Patient  verbalized understanding and is agreeable to follow up call.

## 2024-05-30 PROBLEM — I34.0 MODERATE MITRAL REGURGITATION: Status: ACTIVE | Noted: 2024-05-30

## 2024-06-05 ENCOUNTER — CARE COORDINATION (OUTPATIENT)
Dept: OTHER | Facility: CLINIC | Age: 69
End: 2024-06-05

## 2024-06-05 NOTE — CARE COORDINATION
Ambulatory Care Coordination Note     6/5/2024 12:10 PM     patient outreach attempt by this ACM today to perform care management follow up . ACM was unable to reach the patient by telephone today; left voice message requesting a return phone call to this ACM.     ACM: Chantell Byrne RN     Care Summary Note: Left discreet voicemail with contact number for a call back.    PCP/Specialist follow up:   Future Appointments         Provider Specialty Dept Phone    6/10/2024 8:15 AM SCHEDULE, [facility] Cardiology [phone]    6/12/2024 3:45 PM Ermelinda Vivas MD Infectious Diseases [phone]    8/15/2024 8:30 AM Michael Driver MD Cardiology [phone]            Follow Up:   Plan for next AC outreach in approximately 2 weeks to complete:  Check in, needs? .

## 2024-06-12 ENCOUNTER — OFFICE VISIT (OUTPATIENT)
Dept: INFECTIOUS DISEASES | Age: 69
End: 2024-06-12
Payer: COMMERCIAL

## 2024-06-12 VITALS
BODY MASS INDEX: 30.56 KG/M2 | TEMPERATURE: 97.3 F | DIASTOLIC BLOOD PRESSURE: 71 MMHG | WEIGHT: 179 LBS | HEART RATE: 75 BPM | HEIGHT: 64 IN | SYSTOLIC BLOOD PRESSURE: 131 MMHG

## 2024-06-12 DIAGNOSIS — K13.21 LEUKOPLAKIA OF LIPS: Primary | ICD-10-CM

## 2024-06-12 PROCEDURE — 99214 OFFICE O/P EST MOD 30 MIN: CPT | Performed by: INTERNAL MEDICINE

## 2024-06-12 PROCEDURE — 1123F ACP DISCUSS/DSCN MKR DOCD: CPT | Performed by: INTERNAL MEDICINE

## 2024-06-12 ASSESSMENT — ENCOUNTER SYMPTOMS
EYE DISCHARGE: 0
EYE PAIN: 0
EYE REDNESS: 0
APNEA: 0
COLOR CHANGE: 0
ABDOMINAL DISTENTION: 0

## 2024-06-12 NOTE — PROGRESS NOTES
note that this chart was generated using voice recognition Dragon dictation software.  Although every effort was made to ensure the accuracy of this automated transcription, some errors in transcription mayhave occurred.

## 2024-06-19 ENCOUNTER — CARE COORDINATION (OUTPATIENT)
Dept: OTHER | Facility: CLINIC | Age: 69
End: 2024-06-19

## 2024-06-19 NOTE — CARE COORDINATION
Ambulatory Care Coordination Note     6/19/2024 12:25 PM     patient outreach attempt by this ACM today to perform care management follow up . ACM was unable to reach the patient by telephone today; left voice message requesting a return phone call to this ACM.  Foldaxhart message sent requesting patient to contact this ACM.     ACM: Chantell Byrne RN     Care Summary Note: UTR letter sent via My Chart.     PCP/Specialist follow up:   Future Appointments         Provider Specialty Dept Phone    8/15/2024 8:30 AM Michael Driver MD Cardiology [phone]            Follow Up:   Plan for next ACM outreach in approximately 2 weeks to complete:  - goal progression.

## 2024-07-03 ENCOUNTER — CARE COORDINATION (OUTPATIENT)
Dept: OTHER | Facility: CLINIC | Age: 69
End: 2024-07-03

## 2024-07-03 NOTE — CARE COORDINATION
Ambulatory Care Coordination Note     7/3/2024 9:11 AM     Patient Current Location:  Home: [address] [place] [address]   [place] [address]     Patient graduated from the High Risk Care Management program on 7/3/2024.  Patient verbalizes confidence in the ability to self-manage at this time..  Care management goals have been completed. No further Ambulatory Care Manager follow up scheduled.      ACM: Chantell Byrne RN     Challenges to be reviewed by the provider   Additional needs identified to be addressed with provider No  none               Method of communication with provider: none.    Care Summary Note: Patient states she is doing well.  No further needs identified.  Encouraged patient to reach out should a need arise.  She was appreciative.    Offered patient enrollment in the Remote Patient Monitoring (RPM) program for in-home monitoring: Patient is not eligible for RPM program because: insurance coverage.     Assessments Completed:   No changes since last call    Medications Reviewed:   Completed during a previous call     Advance Care Planning:   Not reviewed during this call     Care Planning:   Not completed during this call    PCP/Specialist follow up:   Future Appointments         Provider Specialty Dept Phone    8/15/2024 8:30 AM Michael Driver MD Cardiology [phone]            Follow Up:   No further Ambulatory Care Management follow-up scheduled at this time.  patient  has Ambulatory Care Manager's contact information for any further questions, concerns or needs.

## 2024-07-24 ENCOUNTER — HOSPITAL ENCOUNTER (OUTPATIENT)
Age: 69
Setting detail: SPECIMEN
Discharge: HOME | End: 2024-07-24

## 2024-07-24 ENCOUNTER — OFFICE VISIT (OUTPATIENT)
Dept: FAMILY MEDICINE CLINIC | Age: 69
End: 2024-07-24
Payer: COMMERCIAL

## 2024-07-24 ENCOUNTER — TELEPHONE (OUTPATIENT)
Dept: PRIMARY CARE CLINIC | Age: 69
End: 2024-07-24

## 2024-07-24 ENCOUNTER — TELEPHONE (OUTPATIENT)
Dept: SURGERY | Age: 69
End: 2024-07-24

## 2024-07-24 VITALS
HEART RATE: 67 BPM | RESPIRATION RATE: 16 BRPM | DIASTOLIC BLOOD PRESSURE: 76 MMHG | SYSTOLIC BLOOD PRESSURE: 130 MMHG | OXYGEN SATURATION: 99 %

## 2024-07-24 DIAGNOSIS — M79.604 PAIN AND SWELLING OF RIGHT LOWER EXTREMITY: ICD-10-CM

## 2024-07-24 DIAGNOSIS — M79.89 PAIN AND SWELLING OF RIGHT LOWER EXTREMITY: ICD-10-CM

## 2024-07-24 DIAGNOSIS — M79.89 PAIN AND SWELLING OF RIGHT LOWER EXTREMITY: Primary | ICD-10-CM

## 2024-07-24 DIAGNOSIS — M79.604 PAIN AND SWELLING OF RIGHT LOWER EXTREMITY: Primary | ICD-10-CM

## 2024-07-24 LAB — D DIMER PPP FEU-MCNC: 0.42 UG/ML FEU (ref 0–0.57)

## 2024-07-24 PROCEDURE — 99213 OFFICE O/P EST LOW 20 MIN: CPT

## 2024-07-24 PROCEDURE — 1123F ACP DISCUSS/DSCN MKR DOCD: CPT

## 2024-07-24 ASSESSMENT — ENCOUNTER SYMPTOMS
CHEST TIGHTNESS: 0
COLOR CHANGE: 1
SHORTNESS OF BREATH: 0

## 2024-07-24 NOTE — TELEPHONE ENCOUNTER
Pt called to report swelling around knee stating swelling is making it painful to bend after cutting grass previously. Pt declined redness,warmth to the touch or bruising. Pt wanted to see PCP, next available appt in Aug. Pt informed of walk in clinic hours and availability, pt verbalized understanding.

## 2024-07-24 NOTE — PROGRESS NOTES
[facility]  [address] [place] [address]    [facility]  [address]  [address]  [facility] [address]  Dept: [phone]    Shaila Olea is a 68 y.o. female Established patient, who presents to the walk-in clinic today with conditions/complaints as noted below:    Chief Complaint   Patient presents with    Leg Swelling     Right leg, x 3 days, hurts to walk, pain behind the knee          HPI:     Patient is a 68-year-old female that presents today for pain and swelling of her right lower extremity. Her symptoms have been present the past three days; no precipitating injury/trauma. Endorses pain behind the knee, along the medial knee, and in the anterolateral lower leg. It's constant; describes tightness and rates it 8/10. Denies knee swelling or decreased ROM. It's worse with ambulation; endorses difficulty. Mentions that she's noticed a slight pink appearance; however no redness or warmth. No documented history of PE/DVT. No recent prolonged immobilization or surgeries. Denies lightheadedness, syncope, chest pain, or difficulty breathing. She's taken Ibuprofen without much relief.        Past Medical History:   Diagnosis Date    COVID-19 vaccine series completed     Depression 07/2023    mother passed away in June /  asked for divorce    Diverticulitis     with GI bleeding, states was seen at [facility] , they wanted to transfuse her but she left    Diverticulosis 2009    Dysphagia     GERD (gastroesophageal reflux disease)     Glaucoma 2011    H/O mitral valve prolapse     diagnoses at age 8 ; Saw Cardiology 5/12/2023 for clearance for dental work, had echo 6/2/2023    Herpes     Hiatal hernia     IBS (irritable bowel syndrome)     Insulin resistance     Lumbar stenosis     MVP (mitral valve prolapse)     Right elbow tendonitis     wears

## 2024-08-01 ENCOUNTER — OFFICE VISIT (OUTPATIENT)
Dept: PRIMARY CARE CLINIC | Age: 69
End: 2024-08-01
Payer: COMMERCIAL

## 2024-08-01 VITALS
RESPIRATION RATE: 16 BRPM | OXYGEN SATURATION: 99 % | DIASTOLIC BLOOD PRESSURE: 76 MMHG | WEIGHT: 179.6 LBS | SYSTOLIC BLOOD PRESSURE: 122 MMHG | HEART RATE: 80 BPM | BODY MASS INDEX: 30.83 KG/M2

## 2024-08-01 DIAGNOSIS — K13.21 LEUKOPLAKIA OF LIPS: ICD-10-CM

## 2024-08-01 DIAGNOSIS — M25.571 ACUTE RIGHT ANKLE PAIN: ICD-10-CM

## 2024-08-01 DIAGNOSIS — E03.9 HYPOTHYROIDISM, UNSPECIFIED TYPE: ICD-10-CM

## 2024-08-01 DIAGNOSIS — R73.09 ELEVATED RANDOM BLOOD GLUCOSE LEVEL: ICD-10-CM

## 2024-08-01 DIAGNOSIS — E78.00 ELEVATED CHOLESTEROL: ICD-10-CM

## 2024-08-01 DIAGNOSIS — M79.604 LEG PAIN, POSTERIOR, RIGHT: Primary | ICD-10-CM

## 2024-08-01 DIAGNOSIS — M46.1 SACROILIITIS, NOT ELSEWHERE CLASSIFIED (HCC): ICD-10-CM

## 2024-08-01 PROCEDURE — 1123F ACP DISCUSS/DSCN MKR DOCD: CPT | Performed by: FAMILY MEDICINE

## 2024-08-01 PROCEDURE — 99213 OFFICE O/P EST LOW 20 MIN: CPT | Performed by: FAMILY MEDICINE

## 2024-08-01 RX ORDER — IBUPROFEN 600 MG/1
600 TABLET ORAL 3 TIMES DAILY PRN
Qty: 90 TABLET | Refills: 0 | Status: SHIPPED | OUTPATIENT
Start: 2024-08-01

## 2024-08-01 RX ORDER — IBUPROFEN 600 MG/1
600 TABLET ORAL 3 TIMES DAILY PRN
Qty: 90 TABLET | Refills: 0 | Status: SHIPPED | OUTPATIENT
Start: 2024-08-01 | End: 2024-08-01 | Stop reason: SDUPTHER

## 2024-08-01 SDOH — ECONOMIC STABILITY: FOOD INSECURITY: WITHIN THE PAST 12 MONTHS, YOU WORRIED THAT YOUR FOOD WOULD RUN OUT BEFORE YOU GOT MONEY TO BUY MORE.: NEVER TRUE

## 2024-08-01 SDOH — ECONOMIC STABILITY: FOOD INSECURITY: WITHIN THE PAST 12 MONTHS, THE FOOD YOU BOUGHT JUST DIDN'T LAST AND YOU DIDN'T HAVE MONEY TO GET MORE.: NEVER TRUE

## 2024-08-01 SDOH — ECONOMIC STABILITY: INCOME INSECURITY: HOW HARD IS IT FOR YOU TO PAY FOR THE VERY BASICS LIKE FOOD, HOUSING, MEDICAL CARE, AND HEATING?: NOT HARD AT ALL

## 2024-08-01 ASSESSMENT — PATIENT HEALTH QUESTIONNAIRE - PHQ9
SUM OF ALL RESPONSES TO PHQ9 QUESTIONS 1 & 2: 0
SUM OF ALL RESPONSES TO PHQ QUESTIONS 1-9: 0
2. FEELING DOWN, DEPRESSED OR HOPELESS: NOT AT ALL
1. LITTLE INTEREST OR PLEASURE IN DOING THINGS: NOT AT ALL
SUM OF ALL RESPONSES TO PHQ QUESTIONS 1-9: 0

## 2024-08-01 NOTE — TELEPHONE ENCOUNTER
Script failed electronically, please resend      Last Visit Date: 8/1/2024   Next Visit Date: 9/5/2024

## 2024-08-01 NOTE — PROGRESS NOTES
Right leg still hurts  Neg D-Dimer test  Discussed need for referral regarding oral/upper lip lesion-recently diagnosed as leukoplakia as per surgical pathology.  Patient has not had a formal medical opinion as of yet on follow-up management.    Ongoing marital stress-amidst divorce  Continuing to work and phlebotomy  Laboratory due    Negative for:     Worry / mood complaints  Headache  Dizziness  Visual Disturbance  Hearing Changes  Nasal / sinus Symptoms  Mouth / tooth symptom, pain  Throat pain  Difficulty swallowing  Neck pain  Chest discomfort  Cough  SOB  N/V/D/C  Pelvic area discomfort  Bladder / voiding discomfort  Bowel complaints  MSk complaints   Numbness/tingling/abnormal sensations   Edema / Leg swelling  Dizziness  Fatigue  Bleeding   Skin    Pertinent Pos: See HPI -as above    Vitals:    08/01/24 1005   BP: 122/76   Pulse: 80   Resp: 16   SpO2: 99%       Alert and oriented to PPT  NAD    HEENT - neg  Neck - no bruits, no lymphadenopathy  Chest  HRRR w/o murmer  LCTAB no wheezes / rhonchi  Extremities -0+ PTE; no discernible right lower extremity edema noted however patient is concerned and states it is there.    Gait / Station - stable, no dysequilibrium, uniform pace, no assist device, cane.     Diagnosis Orders   1. Leg pain, posterior, right  Vascular duplex lower extremity venous right      2. Acute right ankle pain  ibuprofen (ADVIL;MOTRIN) 600 MG tablet      3. Sacroiliitis, not elsewhere classified (HCC)        4. Hypothyroidism, unspecified type  TSH    T4, Free      5. Elevated random blood glucose level  Hemoglobin A1C    Basic Metabolic Panel      6. Elevated cholesterol  Lipid, Fasting      7. Leukoplakia of lips  External Referral To Oral Maxillofacial Surgery          Plan:  1.)  Will engage patient in follow-up  2.)  Vascular study pending  3.)  Laboratory  4.)  Recheck in 4 weeks

## 2024-08-15 ENCOUNTER — PATIENT MESSAGE (OUTPATIENT)
Dept: PRIMARY CARE CLINIC | Age: 69
End: 2024-08-15

## 2024-08-23 NOTE — TELEPHONE ENCOUNTER
Pt stopped into office reporting forms contained an error with the end date. Forms were corrected and faxed back to [facility]

## 2024-09-03 ENCOUNTER — HOSPITAL ENCOUNTER (OUTPATIENT)
Dept: ULTRASOUND IMAGING | Facility: CLINIC | Age: 69
Discharge: HOME | End: 2024-09-05
Attending: FAMILY MEDICINE
Payer: COMMERCIAL

## 2024-09-03 DIAGNOSIS — M79.604 LEG PAIN, POSTERIOR, RIGHT: ICD-10-CM

## 2024-09-03 PROCEDURE — 93971 EXTREMITY STUDY: CPT

## 2024-09-05 ENCOUNTER — OFFICE VISIT (OUTPATIENT)
Dept: PRIMARY CARE CLINIC | Age: 69
End: 2024-09-05
Payer: COMMERCIAL

## 2024-09-05 VITALS
DIASTOLIC BLOOD PRESSURE: 82 MMHG | HEART RATE: 85 BPM | BODY MASS INDEX: 30.97 KG/M2 | OXYGEN SATURATION: 98 % | RESPIRATION RATE: 16 BRPM | SYSTOLIC BLOOD PRESSURE: 128 MMHG | WEIGHT: 180.4 LBS

## 2024-09-05 DIAGNOSIS — M25.561 RIGHT KNEE PAIN, UNSPECIFIED CHRONICITY: Primary | ICD-10-CM

## 2024-09-05 DIAGNOSIS — K13.70 ORAL MUCOSAL LESION: ICD-10-CM

## 2024-09-05 PROCEDURE — 1123F ACP DISCUSS/DSCN MKR DOCD: CPT | Performed by: FAMILY MEDICINE

## 2024-09-05 PROCEDURE — 99213 OFFICE O/P EST LOW 20 MIN: CPT | Performed by: FAMILY MEDICINE

## 2024-09-05 NOTE — PROGRESS NOTES
RLE still sore, aches,, discussed other possible causes including knee pain and capsular inflammation.  Recommend MRI and Ortho consult    Negative for DVT as per report    Sores in mouth-recommended by infectious disease consult previously that she see oral surgeon.  (Exclude leukoplakia).    Longstanding history of right lower ankle pain due to previous surgeries.  Needs handicap placard renewed-typically 5 years.    Negative for:     Worry / mood complaints  Headache  Dizziness  Visual Disturbance  Hearing Changes  Nasal / sinus Symptoms  Mouth / tooth symptom, pain  Throat pain  Difficulty swallowing  Neck pain  Chest discomfort  Cough  SOB  N/V/D/C  Pelvic area discomfort  Bladder / voiding discomfort  Bowel complaints  MSk complaints   Numbness/tingling/abnormal sensations   Edema / Leg swelling  Dizziness  Fatigue  Bleeding   Skin    Pertinent Pos: See HPI -as above    Vitals:    09/05/24 0909   BP: 128/82   Pulse: 85   Resp: 16   SpO2: 98%       Alert and oriented to PPT  NAD    HEENT - neg  Neck - no bruits, no lymphadenopathy  Chest  HRRR w/o murmer  LCTAB no wheezes / rhonchi  Abdomen - soft, non-tender, BS  Extremities -examination of the right lower extremity at the knee shows no abnormal swelling, patient points to knee and below knee as area of concern, possibility of capsular inflammation or possibly internal derangement.  Recommend MRI.    Right lower ankle well-healed surgical scar, 0+ PTE bilaterally    Gait / Station - stable, no dysequilibrium, uniform pace, no assist device, cane.     Diagnosis Orders   1. Right knee pain, unspecified chronicity  MRI KNEE RIGHT WO CONTRAST    Cali Ramos DO, Orthopaedic Surgery, [facility]      2. Oral mucosal lesion  External Referral To Oral Maxillofacial Surgery          Plan:  1.)  need MRI - right knee  2.)  Ortho consult  3.)  Disability placard-5 years  4.)  Recheck in 3 months

## 2024-09-19 ENCOUNTER — HOSPITAL ENCOUNTER (OUTPATIENT)
Dept: MRI IMAGING | Age: 69
Discharge: HOME | End: 2024-09-21
Attending: FAMILY MEDICINE
Payer: COMMERCIAL

## 2024-09-19 DIAGNOSIS — M25.561 RIGHT KNEE PAIN, UNSPECIFIED CHRONICITY: ICD-10-CM

## 2024-09-19 LAB
CHOLEST SERPL-MCNC: 201 MG/DL (ref 0–199)
CHOLESTEROL/HDL RATIO: 3
GLUCOSE SERPL-MCNC: 103 MG/DL (ref 74–99)
HDLC SERPL-MCNC: 80 MG/DL
LDLC SERPL CALC-MCNC: 106 MG/DL (ref 0–100)
PATIENT FASTING?: YES
TRIGL SERPL-MCNC: 74 MG/DL
VLDLC SERPL CALC-MCNC: 15 MG/DL

## 2024-09-19 PROCEDURE — 73721 MRI JNT OF LWR EXTRE W/O DYE: CPT

## 2024-09-20 ENCOUNTER — CLINICAL DOCUMENTATION (OUTPATIENT)
Dept: FAMILY MEDICINE CLINIC | Age: 69
End: 2024-09-20

## 2024-09-20 DIAGNOSIS — S83.241A TEAR OF MEDIAL MENISCUS OF RIGHT KNEE, CURRENT, UNSPECIFIED TEAR TYPE, INITIAL ENCOUNTER: Primary | ICD-10-CM

## 2024-09-24 ENCOUNTER — HOSPITAL ENCOUNTER (OUTPATIENT)
Age: 69
Discharge: HOME | End: 2024-09-24

## 2024-09-24 DIAGNOSIS — R73.09 ELEVATED RANDOM BLOOD GLUCOSE LEVEL: ICD-10-CM

## 2024-09-24 DIAGNOSIS — E03.9 HYPOTHYROIDISM, UNSPECIFIED TYPE: ICD-10-CM

## 2024-10-14 ENCOUNTER — HOSPITAL ENCOUNTER (OUTPATIENT)
Age: 69
Discharge: HOME | End: 2024-10-14
Payer: COMMERCIAL

## 2024-10-14 DIAGNOSIS — E78.00 ELEVATED CHOLESTEROL: ICD-10-CM

## 2024-10-14 LAB
ANION GAP SERPL CALCULATED.3IONS-SCNC: 9 MMOL/L (ref 9–16)
BUN SERPL-MCNC: 14 MG/DL (ref 8–23)
CALCIUM SERPL-MCNC: 9.4 MG/DL (ref 8.6–10.4)
CHLORIDE SERPL-SCNC: 106 MMOL/L (ref 98–107)
CHOLEST SERPL-MCNC: 215 MG/DL (ref 0–199)
CHOLESTEROL/HDL RATIO: 2
CO2 SERPL-SCNC: 26 MMOL/L (ref 20–31)
CREAT SERPL-MCNC: 1 MG/DL (ref 0.5–0.9)
EST. AVERAGE GLUCOSE BLD GHB EST-MCNC: 126 MG/DL
GFR, ESTIMATED: 63 ML/MIN/1.73M2
GLUCOSE SERPL-MCNC: 111 MG/DL (ref 74–99)
HBA1C MFR BLD: 6 % (ref 4–6)
HDLC SERPL-MCNC: 93 MG/DL
LDLC SERPL CALC-MCNC: 107 MG/DL (ref 0–100)
POTASSIUM SERPL-SCNC: 4.2 MMOL/L (ref 3.7–5.3)
SODIUM SERPL-SCNC: 141 MMOL/L (ref 136–145)
T4 FREE SERPL-MCNC: 1 NG/DL (ref 0.92–1.68)
TRIGL SERPL-MCNC: 78 MG/DL (ref 0–149)
TSH SERPL DL<=0.05 MIU/L-ACNC: 1.77 UIU/ML (ref 0.27–4.2)
VLDLC SERPL CALC-MCNC: 16 MG/DL

## 2024-10-14 PROCEDURE — 80048 BASIC METABOLIC PNL TOTAL CA: CPT

## 2024-10-14 PROCEDURE — 84439 ASSAY OF FREE THYROXINE: CPT

## 2024-10-14 PROCEDURE — 80061 LIPID PANEL: CPT

## 2024-10-14 PROCEDURE — 83036 HEMOGLOBIN GLYCOSYLATED A1C: CPT

## 2024-10-14 PROCEDURE — 84443 ASSAY THYROID STIM HORMONE: CPT

## 2024-11-13 ENCOUNTER — TELEPHONE (OUTPATIENT)
Dept: PRIMARY CARE CLINIC | Age: 69
End: 2024-11-13

## 2024-11-13 NOTE — TELEPHONE ENCOUNTER
Patient stopped into office stating she has misplaced the rx to get a handicap placard and is needing a new one. Patient requested a copy, writer informed a new script would need printed and signed by PCP. Informed patient that PCP will be back in office tomorrow and a message could be sent.

## 2024-11-21 ENCOUNTER — TELEPHONE (OUTPATIENT)
Dept: PRIMARY CARE CLINIC | Age: 69
End: 2024-11-21

## 2024-11-21 ENCOUNTER — CLINICAL DOCUMENTATION (OUTPATIENT)
Dept: PRIMARY CARE CLINIC | Age: 69
End: 2024-11-21

## 2024-12-06 DIAGNOSIS — M25.561 RIGHT KNEE PAIN, UNSPECIFIED CHRONICITY: Primary | ICD-10-CM

## 2024-12-06 NOTE — PATIENT INSTRUCTIONS
PATIENTIQ:  PatientIQ helps [facility] stay in touch with you to know how you're feeling, and provides education and care instructions to you at various time points.   Your answers help your care team track your progress to provide the best care possible. PatientIQ will contact you pre-op and post-op via email or text with:  Educational Videos and Care Instructions  Questionnaires About How You're Feeling    Your participation provides you valuable education and helps [facility] continue to provide quality care to all patients. Thank you    CORTISONE INJECTION CARE    The injection site should never get red, hot, or swollen and if it does the patient will contact our office right away. The patient may experience a increase in soreness the first 24-48 hours due to a cortisone flair and can take anti-inflammatories for a short period of time to reduce that soreness. The patient should not submerge the injection site in water for a minimum of 24 hours to avoid infection. This means no lakes, pools, ponds, or hot tubs for 24 hours. If the patient is diabetic the injection may increase their blood sugar for up to one week. The patient can do this cortisone injection once every 4 months as needed.

## 2024-12-09 ENCOUNTER — OFFICE VISIT (OUTPATIENT)
Dept: ORTHOPEDIC SURGERY | Age: 69
End: 2024-12-09
Payer: COMMERCIAL

## 2024-12-09 VITALS — WEIGHT: 178 LBS | BODY MASS INDEX: 30.39 KG/M2 | OXYGEN SATURATION: 98 % | HEIGHT: 64 IN | RESPIRATION RATE: 15 BRPM

## 2024-12-09 DIAGNOSIS — M23.203 DEGENERATIVE TEAR OF MEDIAL MENISCUS OF RIGHT KNEE: ICD-10-CM

## 2024-12-09 DIAGNOSIS — M25.461 KNEE EFFUSION, RIGHT: ICD-10-CM

## 2024-12-09 DIAGNOSIS — M22.41 CHONDROMALACIA, PATELLA, RIGHT: Primary | ICD-10-CM

## 2024-12-09 PROCEDURE — 99204 OFFICE O/P NEW MOD 45 MIN: CPT | Performed by: PHYSICIAN ASSISTANT

## 2024-12-09 PROCEDURE — 20611 DRAIN/INJ JOINT/BURSA W/US: CPT | Performed by: PHYSICIAN ASSISTANT

## 2024-12-09 PROCEDURE — 1123F ACP DISCUSS/DSCN MKR DOCD: CPT | Performed by: PHYSICIAN ASSISTANT

## 2024-12-09 RX ORDER — LIDOCAINE HYDROCHLORIDE 10 MG/ML
6 INJECTION, SOLUTION INFILTRATION; PERINEURAL ONCE
Status: COMPLETED | OUTPATIENT
Start: 2024-12-09 | End: 2024-12-09

## 2024-12-09 RX ORDER — METHYLPREDNISOLONE ACETATE 80 MG/ML
80 INJECTION, SUSPENSION INTRA-ARTICULAR; INTRALESIONAL; INTRAMUSCULAR; SOFT TISSUE ONCE
Status: COMPLETED | OUTPATIENT
Start: 2024-12-09 | End: 2024-12-09

## 2024-12-09 RX ADMIN — LIDOCAINE HYDROCHLORIDE 6 ML: 10 INJECTION, SOLUTION INFILTRATION; PERINEURAL at 17:00

## 2024-12-09 RX ADMIN — METHYLPREDNISOLONE ACETATE 80 MG: 80 INJECTION, SUSPENSION INTRA-ARTICULAR; INTRALESIONAL; INTRAMUSCULAR; SOFT TISSUE at 17:00

## 2024-12-09 ASSESSMENT — ENCOUNTER SYMPTOMS
SHORTNESS OF BREATH: 0
COLOR CHANGE: 0
ABDOMINAL DISTENTION: 0
DIARRHEA: 0
GASTROINTESTINAL NEGATIVE: 1
CONSTIPATION: 0
APNEA: 0
VOMITING: 0
ABDOMINAL PAIN: 0
NAUSEA: 0
RESPIRATORY NEGATIVE: 1
CHEST TIGHTNESS: 0
COUGH: 0

## 2024-12-09 NOTE — PROGRESS NOTES
Gastrointestinal: Negative.  Negative for abdominal distention, abdominal pain, constipation, diarrhea, nausea and vomiting.   Genitourinary: Negative.  Negative for difficulty urinating, dysuria and hematuria.   Musculoskeletal:  Positive for arthralgias, gait problem and joint swelling. Negative for myalgias.   Skin: Negative.  Negative for color change and rash.   Neurological:  Negative for dizziness, weakness, numbness and headaches.   Psychiatric/Behavioral:  Positive for sleep disturbance.        Past Medical History:    Past Medical History:   Diagnosis Date    COVID-19 vaccine series completed     Depression 07/2023    mother passed away in June /  asked for divorce    Diverticulitis     with GI bleeding, states was seen at [facility] , they wanted to transfuse her but she left    Diverticulosis 2009    Dysphagia     GERD (gastroesophageal reflux disease)     Glaucoma 2011    H/O mitral valve prolapse     diagnoses at age 8 ; Saw Cardiology 5/12/2023 for clearance for dental work, had echo 6/2/2023    Herpes     Hiatal hernia     IBS (irritable bowel syndrome)     Insulin resistance     Lumbar stenosis     MVP (mitral valve prolapse)     Right elbow tendonitis     wears a splint on right wrist to help this    Sacroiliitis (HCC)     Sarcoidosis     pt denies this , has never seen a rheumatologist, in Cardiology note ( ? typo / h/o sacroiliitis )    Sleep disturbance 07/2023    Snores     Under care of team     Cardiology, GURPREET, seen 5/12/2023 , for clearance for dental procedure    Under care of team     GI, Dr. Gonzalez, last seen 5/16/2023    Vitamin D deficiency     Wears glasses     Wellness examination     PCPTorri, last seen 7/27/2023       Past Surgical History:    Past Surgical History:   Procedure Laterality Date    COLONOSCOPY      states has had several    COLONOSCOPY  09/01/2023    POLYPECTOMY SNARE/COLD BIOPSY ,, HOT    COLONOSCOPY N/A 9/1/2023    COLONOSCOPY POLYPECTOMY SNARE/COLD BIOPSY

## 2024-12-10 ENCOUNTER — OFFICE VISIT (OUTPATIENT)
Dept: PRIMARY CARE CLINIC | Age: 69
End: 2024-12-10
Payer: COMMERCIAL

## 2024-12-10 VITALS
BODY MASS INDEX: 29.88 KG/M2 | HEIGHT: 64 IN | DIASTOLIC BLOOD PRESSURE: 74 MMHG | SYSTOLIC BLOOD PRESSURE: 118 MMHG | OXYGEN SATURATION: 100 % | WEIGHT: 175 LBS | HEART RATE: 79 BPM

## 2024-12-10 DIAGNOSIS — K64.3 PROLAPSED INTERNAL HEMORRHOIDS, GRADE 4: Primary | ICD-10-CM

## 2024-12-10 DIAGNOSIS — K57.90 DIVERTICULOSIS: ICD-10-CM

## 2024-12-10 DIAGNOSIS — Z12.31 ENCOUNTER FOR SCREENING MAMMOGRAM FOR MALIGNANT NEOPLASM OF BREAST: ICD-10-CM

## 2024-12-10 PROCEDURE — 99213 OFFICE O/P EST LOW 20 MIN: CPT | Performed by: FAMILY MEDICINE

## 2024-12-10 PROCEDURE — 1123F ACP DISCUSS/DSCN MKR DOCD: CPT | Performed by: FAMILY MEDICINE

## 2024-12-10 NOTE — PROGRESS NOTES
Recent new problem-hemorrhoids, protruding and not regressing (likely grade 4)  R knee - in treatment with Ortho sports medicine-[facility]; recommended PT, tapped fluid off the knee, next planned visit next appt 02/04 9:15 a.m.  Possible scope anticipated-based on information    Still strife with     Reprint communications letter for disability placard  Requesting GI referral for previous workup that was started regarding diverticulosis.    Negative for:     Worry / mood complaints  Headache  Dizziness  Visual Disturbance  Hearing Changes  Nasal / sinus Symptoms  Mouth / tooth symptom, pain  Throat pain  Difficulty swallowing  Neck pain  Chest discomfort  Cough  SOB  N/V/D/C  Pelvic area discomfort  Bladder / voiding discomfort  Bowel complaints  MSk complaints   Numbness/tingling/abnormal sensations   Edema / Leg swelling  Dizziness  Fatigue  Bleeding   Skin    Pertinent Pos: See HPI -as above    Vitals:    12/10/24 0908   BP: 118/74   Pulse: 79   SpO2: 100%       Alert and oriented to PPT  NAD    HEENT - neg  Neck - no bruits, no lymphadenopathy  Chest  HRRR w/o murmer  LCTAB no wheezes / rhonchi  Abdomen - soft, non-tender, BS  Extremities -0+ PTE    Gait / Station - stable, no dysequilibrium, uniform pace, no assist device, cane.     Diagnosis Orders   1. Prolapsed internal hemorrhoids, grade 4  AFL - Bakari Abbasi MD, Colorectal Surgery, [place]      2. Encounter for screening mammogram for malignant neoplasm of breast  [facility] W OR WO CAD BILATERAL      3. Diverticulosis  Angelo Mast MD, Gastroenterology, [facility] reviewed-reconciled        Plan:  1.)  Had her flu [facility] -[facility]  2.)  Referrals placed as per patient discussion  3.)  Recheck as needed

## 2024-12-18 ENCOUNTER — OFFICE VISIT (OUTPATIENT)
Dept: GASTROENTEROLOGY | Age: 69
End: 2024-12-18
Payer: COMMERCIAL

## 2024-12-18 ENCOUNTER — TELEPHONE (OUTPATIENT)
Dept: GASTROENTEROLOGY | Age: 69
End: 2024-12-18

## 2024-12-18 ENCOUNTER — PREP FOR PROCEDURE (OUTPATIENT)
Dept: GASTROENTEROLOGY | Age: 69
End: 2024-12-18

## 2024-12-18 VITALS
DIASTOLIC BLOOD PRESSURE: 81 MMHG | TEMPERATURE: 98.1 F | RESPIRATION RATE: 20 BRPM | SYSTOLIC BLOOD PRESSURE: 131 MMHG | OXYGEN SATURATION: 99 % | WEIGHT: 176 LBS | BODY MASS INDEX: 30.05 KG/M2 | HEIGHT: 64 IN | HEART RATE: 64 BPM

## 2024-12-18 DIAGNOSIS — R13.19 ESOPHAGEAL DYSPHAGIA: ICD-10-CM

## 2024-12-18 DIAGNOSIS — K64.4 EXTERNAL HEMORRHOIDS: ICD-10-CM

## 2024-12-18 DIAGNOSIS — R13.19 ESOPHAGEAL DYSPHAGIA: Primary | ICD-10-CM

## 2024-12-18 DIAGNOSIS — K60.2 ANAL FISSURE: ICD-10-CM

## 2024-12-18 PROCEDURE — 99214 OFFICE O/P EST MOD 30 MIN: CPT | Performed by: INTERNAL MEDICINE

## 2024-12-18 PROCEDURE — G2211 COMPLEX E/M VISIT ADD ON: HCPCS | Performed by: INTERNAL MEDICINE

## 2024-12-18 PROCEDURE — 1123F ACP DISCUSS/DSCN MKR DOCD: CPT | Performed by: INTERNAL MEDICINE

## 2024-12-18 RX ORDER — POLYETHYLENE GLYCOL 3350 17 G/17G
17 POWDER, FOR SOLUTION ORAL DAILY
Qty: 1530 G | Refills: 1 | Status: SHIPPED | OUTPATIENT
Start: 2024-12-18 | End: 2025-01-17

## 2024-12-18 RX ORDER — HYDROCORTISONE ACETATE 25 MG/1
25 SUPPOSITORY RECTAL EVERY 12 HOURS
Qty: 28 SUPPOSITORY | Refills: 0 | Status: SHIPPED | OUTPATIENT
Start: 2024-12-18 | End: 2025-01-01

## 2024-12-18 RX ORDER — NITROGLYCERIN 4 MG/G
1 OINTMENT RECTAL EVERY 12 HOURS
Qty: 30 G | Refills: 0 | Status: SHIPPED | OUTPATIENT
Start: 2024-12-18 | End: 2025-01-15

## 2024-12-18 ASSESSMENT — ENCOUNTER SYMPTOMS
NAUSEA: 0
VOMITING: 0
WHEEZING: 0
COLOR CHANGE: 0
ABDOMINAL DISTENTION: 1
CHOKING: 1
CONSTIPATION: 1
TROUBLE SWALLOWING: 1
SORE THROAT: 0
ANAL BLEEDING: 0
BLOOD IN STOOL: 0
DIARRHEA: 0
ABDOMINAL PAIN: 1
VOICE CHANGE: 0
COUGH: 0
RECTAL PAIN: 1
SHORTNESS OF BREATH: 1

## 2024-12-18 NOTE — TELEPHONE ENCOUNTER
Pt saw Sheron today in clinic. EDG with dilation ordered. Pt would like procedure done before the end of the year. Pt does not take blood thinners.     Procedure scheduled/Dr Holbrook  Procedure: EGD with dilation  Dx:  Esophageal dysphagia  Date: Friday 12/20/24  Time: 10:15 am/Arrive at 8:15 am  Hospital: [facility]: Surgery Entrance, back of hospital  PAT Phone Call:  Bowel Prep instructions given: EGD Prep  In office/via phone: in office  Clearance needed: N/A    Pt informed they will receive a PAT Phone Call from a [facility] PAT Nurse within next day prior to procedure. Pt informed they must complete PAT Call or procedure may be cancelled.     Pt informed it is required they must have a /responsible adult that takes them to their procedure, stays at the hospital (before, during, and after procedure), and drives pt home. Pt informed /responsible adult must stay inside the hospital during their procedure. Pt voiced understanding of  protocol for procedure.

## 2024-12-18 NOTE — PROGRESS NOTES
sodium (VOLTAREN) 1 % GEL, Apply 4 g topically 4 times daily as needed for Pain (Patient taking differently: Apply 4 g topically 4 times daily as needed for Pain (right elbow)), Disp: 150 g, Rfl: 3    melatonin 5 MG TABS tablet, Take 1 tablet by mouth nightly, Disp: 30 tablet, Rfl: 0    fluticasone (FLONASE) 50 MCG/ACT nasal spray, 2 sprays by Each Nostril route daily (Patient taking differently: 2 sprays by Each Nostril route daily as needed for Allergies), Disp: 16 g, Rfl: 0    albuterol sulfate  (90 Base) MCG/ACT inhaler, Inhale 2 puffs into the lungs every 6 hours as needed for Wheezing, Disp: 1 Inhaler, Rfl: 1    cetirizine (ZYRTEC) 10 MG tablet, Take 1 tablet by mouth daily as needed, Disp: , Rfl:     Multiple Vitamin (MULTI-VITAMIN DAILY PO), Take 1 tablet by mouth daily, Disp: , Rfl:     LUMIGAN 0.01 % SOLN, Place 1 drop into both eyes nightly, Disp: , Rfl:     EPINEPHrine (EPIPEN 2-ANSON) 0.3 MG/0.3ML SOAJ injection, Inject 0.3 mLs into the muscle once for 1 dose Use as directed for allergic reaction, Disp: 0.3 mL, Rfl: 0    ALLERGIES:   Allergies   Allergen Reactions    Latex Other (See Comments)     Latex gloves caused hives and itching to hands    When at Dentist office they used something around her mouth that caused her mouth and face to swell up    Cat Hair Extract Anaphylaxis, Hives and Itching    Dust Mite Extract Shortness Of Breath    Molds & Smuts Hives, Itching and Swelling    Codeine Nausea And Vomiting    Eggs Or Egg-Derived Products [Egg-Derived Products] Diarrhea and Nausea And Vomiting     Still eats them occasionally , but only 1, does not bother her in other foods ( baked goods )    Percocet [Oxycodone-Acetaminophen] Nausea And Vomiting       FAMILY HISTORY:       Problem Relation Age of Onset    Breast Cancer Sister     Diabetes Sister     Diabetes Mother     Hypertension Mother     Tuberculosis Mother     Obesity Mother     Heart Disease Father     Arthritis Father     Diabetes

## 2024-12-19 ENCOUNTER — HOSPITAL ENCOUNTER (OUTPATIENT)
Dept: PREADMISSION TESTING | Age: 69
Setting detail: OUTPATIENT SURGERY
Discharge: HOME | End: 2024-12-23
Payer: COMMERCIAL

## 2024-12-19 ENCOUNTER — ANESTHESIA EVENT (OUTPATIENT)
Dept: ENDOSCOPY | Age: 69
End: 2024-12-19
Payer: COMMERCIAL

## 2024-12-19 VITALS — BODY MASS INDEX: 30.05 KG/M2 | WEIGHT: 176 LBS | HEIGHT: 64 IN

## 2024-12-19 NOTE — TELEPHONE ENCOUNTER
Writer received case message from [facility] Surgery Scheduling, pt's procedure was moved to 8:15 am/Arrive at 6:15 am in order to fit surgery grid for tomorrow.      Writer called and spoke with pt regarding procedure time change. Pt said she just made arrangements for her  tomorrow. Pt states she will call her  and check and then will call office back.

## 2024-12-19 NOTE — TELEPHONE ENCOUNTER
Pt returned phone call. She checked with her  for tomorrow and said that is fine, she will arrive at 6:15 am for her 8:15 am procedure.

## 2024-12-19 NOTE — PROGRESS NOTES
Pre-op Instructions For Out-Patient Endoscopy Surgery    Medication Instructions:  Please stop herbs and any supplements now (includes vitamins and minerals).    For these medications:  Dulaglutide (Trulicity), Exenatide (Byetta and Bydureon, Liraglutide (Victoza), Lixisenatide (Adlyxin), Semaglutide (Ozempic and Rybelsus), Tirzepatide (Mounjaro)- Stop 1 week prior if taking weekly or 1 day prior if taking every 12 hours or daily.     Please contact your surgeon and prescribing physician for pre-op instructions for any blood thinners. IBUPROFEN    If you have inhalers/aerosol treatments at home, please use them the morning of your surgery and bring the inhalers with you to the hospital.    Please take the following medications the morning of your surgery with a sip of water:    none    Surgery Instructions:  After midnight before surgery:  Do not eat or drink anything, including water, mints, gum, and hard candy.  You may brush your teeth without swallowing.  No smoking, chewing tobacco, or street drugs.     ** Please Follow Bowel Prep instructions if given by surgeon's office**    Please shower or bathe before surgery.       Please do not wear any cologne, lotion, powder, jewelry, piercings, perfume, makeup, nail polish, hair accessories, or hair spray on the day of surgery.  Wear loose comfortable clothing.    Leave your valuables at home but bring a payment source for any after-surgery prescriptions you plan to fill at [facility].  Bring a storage case for any glasses/contacts.    An adult who is responsible for you MUST drive you home and should be with you for the first 24 hours after surgery.     The Day of Surgery:  Arrive at [facility] Surgery Entrance at the time directed by your surgeon and check in at the desk.     If you have a living will or healthcare power of , please bring a copy.    You will be taken to the pre-op holding area where you will be prepared for surgery.   A physical assessment will be performed by a nurse practitioner or house officer.  Your IV will be started and you will meet your anesthesiologist.    When you go to surgery, your family will be directed to the surgical waiting room, where the doctor should speak with them after your surgery.    After surgery, you will be taken to the recovery area.  When you are alert and stable, you will receive instructions and be prepared for discharge.     Pt verbalized understanding, all questions answered.

## 2024-12-20 ENCOUNTER — HOSPITAL ENCOUNTER (OUTPATIENT)
Age: 69
Setting detail: OUTPATIENT SURGERY
Discharge: HOME | End: 2024-12-20
Attending: INTERNAL MEDICINE | Admitting: INTERNAL MEDICINE
Payer: COMMERCIAL

## 2024-12-20 ENCOUNTER — ANESTHESIA (OUTPATIENT)
Dept: ENDOSCOPY | Age: 69
End: 2024-12-20
Payer: COMMERCIAL

## 2024-12-20 VITALS
BODY MASS INDEX: 30.05 KG/M2 | RESPIRATION RATE: 11 BRPM | OXYGEN SATURATION: 99 % | HEIGHT: 64 IN | HEART RATE: 67 BPM | SYSTOLIC BLOOD PRESSURE: 136 MMHG | TEMPERATURE: 97.9 F | WEIGHT: 176 LBS | DIASTOLIC BLOOD PRESSURE: 78 MMHG

## 2024-12-20 DIAGNOSIS — R13.19 ESOPHAGEAL DYSPHAGIA: ICD-10-CM

## 2024-12-20 PROCEDURE — 6360000002 HC RX W HCPCS: Performed by: NURSE ANESTHETIST, CERTIFIED REGISTERED

## 2024-12-20 PROCEDURE — 3609012400 HC EGD TRANSORAL BIOPSY SINGLE/MULTIPLE: Performed by: INTERNAL MEDICINE

## 2024-12-20 PROCEDURE — 2709999900 HC NON-CHARGEABLE SUPPLY: Performed by: INTERNAL MEDICINE

## 2024-12-20 PROCEDURE — 43249 ESOPH EGD DILATION <30 MM: CPT | Performed by: INTERNAL MEDICINE

## 2024-12-20 PROCEDURE — C1726 CATH, BAL DIL, NON-VASCULAR: HCPCS | Performed by: INTERNAL MEDICINE

## 2024-12-20 PROCEDURE — 88305 TISSUE EXAM BY PATHOLOGIST: CPT

## 2024-12-20 PROCEDURE — 7100000011 HC PHASE II RECOVERY - ADDTL 15 MIN: Performed by: INTERNAL MEDICINE

## 2024-12-20 PROCEDURE — 7100000010 HC PHASE II RECOVERY - FIRST 15 MIN: Performed by: INTERNAL MEDICINE

## 2024-12-20 PROCEDURE — 3700000000 HC ANESTHESIA ATTENDED CARE: Performed by: INTERNAL MEDICINE

## 2024-12-20 PROCEDURE — 6360000002 HC RX W HCPCS: Performed by: ANESTHESIOLOGY

## 2024-12-20 PROCEDURE — 3700000001 HC ADD 15 MINUTES (ANESTHESIA): Performed by: INTERNAL MEDICINE

## 2024-12-20 PROCEDURE — 2580000003 HC RX 258: Performed by: ANESTHESIOLOGY

## 2024-12-20 PROCEDURE — 88342 IMHCHEM/IMCYTCHM 1ST ANTB: CPT

## 2024-12-20 PROCEDURE — 43239 EGD BIOPSY SINGLE/MULTIPLE: CPT | Performed by: INTERNAL MEDICINE

## 2024-12-20 RX ORDER — SODIUM CHLORIDE 9 MG/ML
INJECTION, SOLUTION INTRAVENOUS PRN
Status: DISCONTINUED | OUTPATIENT
Start: 2024-12-20 | End: 2024-12-20 | Stop reason: HOSPADM

## 2024-12-20 RX ORDER — SODIUM CHLORIDE 0.9 % (FLUSH) 0.9 %
5-40 SYRINGE (ML) INJECTION EVERY 12 HOURS SCHEDULED
Status: DISCONTINUED | OUTPATIENT
Start: 2024-12-20 | End: 2024-12-20 | Stop reason: HOSPADM

## 2024-12-20 RX ORDER — SODIUM CHLORIDE 9 MG/ML
INJECTION, SOLUTION INTRAVENOUS CONTINUOUS
Status: DISCONTINUED | OUTPATIENT
Start: 2024-12-20 | End: 2024-12-20 | Stop reason: HOSPADM

## 2024-12-20 RX ORDER — SODIUM CHLORIDE 0.9 % (FLUSH) 0.9 %
5-40 SYRINGE (ML) INJECTION PRN
Status: DISCONTINUED | OUTPATIENT
Start: 2024-12-20 | End: 2024-12-20 | Stop reason: HOSPADM

## 2024-12-20 RX ORDER — LIDOCAINE HYDROCHLORIDE 20 MG/ML
INJECTION, SOLUTION INFILTRATION; PERINEURAL
Status: DISCONTINUED | OUTPATIENT
Start: 2024-12-20 | End: 2024-12-20 | Stop reason: SDUPTHER

## 2024-12-20 RX ORDER — LIDOCAINE HYDROCHLORIDE 10 MG/ML
1 INJECTION, SOLUTION EPIDURAL; INFILTRATION; INTRACAUDAL; PERINEURAL
Status: COMPLETED | OUTPATIENT
Start: 2024-12-20 | End: 2024-12-20

## 2024-12-20 RX ORDER — PROPOFOL 10 MG/ML
INJECTION, EMULSION INTRAVENOUS
Status: DISCONTINUED | OUTPATIENT
Start: 2024-12-20 | End: 2024-12-20 | Stop reason: SDUPTHER

## 2024-12-20 RX ADMIN — LIDOCAINE HYDROCHLORIDE 80 MG: 20 INJECTION, SOLUTION EPIDURAL; INFILTRATION; INTRACAUDAL; PERINEURAL at 08:41

## 2024-12-20 RX ADMIN — LIDOCAINE HYDROCHLORIDE 1 ML: 10 INJECTION, SOLUTION EPIDURAL; INFILTRATION; INTRACAUDAL; PERINEURAL at 07:28

## 2024-12-20 RX ADMIN — SODIUM CHLORIDE: 9 INJECTION, SOLUTION INTRAVENOUS at 07:28

## 2024-12-20 RX ADMIN — PROPOFOL 175 MCG/KG/MIN: 10 INJECTION, EMULSION INTRAVENOUS at 08:41

## 2024-12-20 ASSESSMENT — PAIN - FUNCTIONAL ASSESSMENT
PAIN_FUNCTIONAL_ASSESSMENT: 0-10
PAIN_FUNCTIONAL_ASSESSMENT: 0-10

## 2024-12-20 ASSESSMENT — ENCOUNTER SYMPTOMS
BACK PAIN: 0
SHORTNESS OF BREATH: 0
TROUBLE SWALLOWING: 1
COUGH: 0
SORE THROAT: 0

## 2024-12-20 NOTE — H&P
HISTORY and PHYSICAL  [facility]       NAME:  Shaila Olea  MRN: [id number]   YOB: 1955   Date: 12/20/2024   Age: 69 y.o.  Gender: female       COMPLAINT AND PRESENT HISTORY:       Shaila Olea is 69 y.o.  female, here for     Procedure(s):  ESOPHAGOGASTRODUODENOSCOPY DILATATION BALLOON    Pre-Op Diagnosis Codes:      * Esophageal dysphagia [R13.19]    Below italics a portion of office visit note by Dr. Holbrook dated 12/18/24: Reviewed   HISTORY OF PRESENT ILLNESS: Ms.Regina RM Olea is a 69 y.o. female with a past history remarkable for hiatal hernia, diverticulosis,, referred for evaluation of diverticulosis and lower rectal discomfort.  The patient has been having discomfort around her rectal area for the last few weeks.  She also has hard stools.  She reports BSS 1 and 5 stool.  She denies any blood in stool.  She also reports having dysphagia to solids.  This has been ongoing for the last few months.  She previously had EGD with dilation performed, no formal report available.  She is currently taking omeprazole once daily.        Previous Endoscopies     EGD 2023:  1-2 cm hiatal hernia identified between 36 to 38 cm, strictures or obstructive lesions.  No evidence of esophagitis  2-mild scattered areas of erythema identified in the gastric body, cold biopsy taken to evaluate for H. pylori  3-small proximal duodenal bulb polyp, 6mm, versus Brunner's gland hyperplasia, cold biopsy taken for Histopath evaluation     Colonoscopy 2023:  Fair prep  a severely tortuous left colon with noncompliant sigmoid colon likely related to adhesions and dense scar tissue from previous pelvic surgeries.  Water immersion technique was used to navigate through the sigmoid segment     1-pedunculated polypoid lesion identified in the sigmoid colon, 9 mm status post hot snare polypectomy removal  2-moderate left-sided diverticulosis  3-hepatic flexure polyp, 8 mm status post hot snare polypectomy  Laterality Date    ABDOMEN SURGERY      x2    COLONOSCOPY      states has had several    COLONOSCOPY  09/01/2023    POLYPECTOMY SNARE/COLD BIOPSY ,, HOT    COLONOSCOPY N/A 09/01/2023    COLONOSCOPY POLYPECTOMY SNARE/COLD BIOPSY ,, HOT performed by Miles Gonzalez MD at [facility] OR    ESOPHAGOGASTRODUODENOSCOPY  04/10/2013    ESOPHAGOGASTRODUODENOSCOPY  04/08/2008    with dilitation    ESOPHAGOGASTRODUODENOSCOPY  02/06/2014    ESOPHAGOGASTRODUODENOSCOPY  09/01/2023    BIOPSY    FOOT NEUROMA SURGERY Left     FOOT SURGERY Right 08/29/2017    peronis brevis debridement    FOOT SURGERY Right 06/19/2018    ankle arthroscopy with debridement and tenolysis    LYMPH NODE BIOPSY Right 11/17/2010    axilla    OVARY SURGERY Left     1990's; mass and ovary removed    PELVIC LAPAROSCOPY      Dr. Nogueira for extensive CYRUS at [facility]    ABDIEL AND BSO (CERVIX REMOVED)  04/02/2009    hyst with Right SO, Left salpingectomy, extensive CYRUS : Dr. Genao at [facility]    TONSILLECTOMY AND ADENOIDECTOMY  1973    UPPER GASTROINTESTINAL ENDOSCOPY N/A 09/01/2023    EGD BIOPSY performed by Miles Gonzalez MD at [facility] OR       FAMILY HISTORY       Family History   Problem Relation Age of Onset    Breast Cancer Sister     Diabetes Sister     Diabetes Mother     Hypertension Mother     Tuberculosis Mother     Obesity Mother     Heart Disease Father     Arthritis Father     Diabetes Sister     Diabetes Sister     Colon Cancer Maternal Grandmother     Stroke Other     Kidney Disease Other        SOCIAL HISTORY       Social History     Socioeconomic History    Marital status: Legally    Tobacco Use    Smoking status: Never     Passive exposure: Never    Smokeless tobacco: Never   Vaping Use    Vaping status: Never Used   Substance and Sexual Activity    Alcohol use: Yes     Comment: rare    Drug use: Never    Sexual activity: Not Currently     Partners: Male     Social Determinants of Health     Financial Resource Strain: Low Risk  (8/1/2024)    Overall

## 2024-12-20 NOTE — OP NOTE
EGD report    Esophagogastroduodenoscopy (EGD) Procedure Note    Procedure:  EGD with Biopsies, dilation using CRE balloon    Procedure Date: 12/20/2024    Indications: Dysphagia, dyspepsia    Sedation: MAC    Attending Physician:  Dr. Angelo Holbrook MD    Assistant:  None    Procedure Details:    Informed consent was obtained for the procedure, including sedation. Risks of infection, perforation, hemorrhage, adverse drug reaction, and aspiration were discussed. The patient was placed in the left lateral decubitus position. The patient was monitored continuously with ECG tracing, pulse oximetry, blood pressure monitoring, and direct observation.      The gastroscope was inserted into the mouth and advanced under direct vision to second portion of the duodenum.  A careful inspection was made as the gastroscope was withdrawn, including a retroflexed view of the proximal stomach; findings and interventions are described below. Appropriate photodocumentation was obtained.    Findings:  Retropharyngeal area was grossly normal appearing     Esophagus: Very mild resistance to passage of scope through the upper esophageal sphincter.  Using a CRE balloon 15-16.5-18 mm, the balloon was dilated up to 15 mm and gradually better through the upper esophageal sphincter.  Mucosal disruption noted suggestive of successful dilation.                          Esophagogastric markings: Diaphragmatic hiatus-34 cm; GE junction-30 cm, a 4 cm hiatal hernia observed    A widely patent Schatzki's ring noted at the GE junction.  Using a CRE balloon, dilation up to 18 mm performed and the ring still appeared widely patent.  Using a biopsy forceps, the ring was a disrupted.    Proximal esophageal biopsies also obtained     Stomach:    Fundus: normal    Body: normal    Antrum: normal  .  Biopsies obtained to rule out     Duodenum:     Bulb: normal    First part: Normal    Second Part: Normal      Complications:  None           Estimated blood

## 2024-12-20 NOTE — ANESTHESIA PRE PROCEDURE
Department of Anesthesiology  Preprocedure Note       Name:  Shaila Olea   Age:  69 y.o.  :  1955                                          MRN:  [id number]         Date:  2024      Surgeon: Surgeon(s):  Angelo Holbrook MD    Procedure: Procedure(s):  ESOPHAGOGASTRODUODENOSCOPY DILATATION BALLOON    Medications prior to admission:   Prior to Admission medications    Medication Sig Start Date End Date Taking? Authorizing Provider   polyethylene glycol (GLYCOLAX) 17 GM/SCOOP powder Take 17 g by mouth daily 24 Yes Angelo Holbrook MD   methylcellulose (CITRUCEL) oral powder Take by mouth daily. 24  Yes Angelo Holbrook MD   hydrocortisone (ANUSOL-HC) 25 MG suppository Place 1 suppository rectally in the morning and 1 suppository in the evening. Do all this for 14 days. 24 Yes Angelo Holbrook MD   ibuprofen (ADVIL;MOTRIN) 600 MG tablet Take 1 tablet by mouth 3 times daily as needed for Pain 24  Yes Jesse Kiran DO   omeprazole (PRILOSEC) 20 MG delayed release capsule Take 1 capsule by mouth every morning (before breakfast) 2/15/24  Yes Richi Alatorre MD   sennosides-docusate sodium (SENOKOT-S) 8.6-50 MG tablet Take 1 tablet by mouth daily  Patient taking differently: Take 1 tablet by mouth daily As needed 2/15/24  Yes Richi Alatorre MD   albuterol sulfate HFA (PROVENTIL HFA) 108 (90 Base) MCG/ACT inhaler Inhale 1-2 puffs into the lungs every 4 hours as needed for Wheezing or Shortness of Breath (Space out to every 6 hours as symptoms improve) Space out to every 6 hours as symptoms improve. 10/8/23  Yes Raúl Fitch MD   Cyanocobalamin (VITAMIN B 12 PO) Take 3,000 mcg by mouth daily   Yes Omi Prieto MD   Cholecalciferol (VITAMIN D3) 125 MCG (5000 UT) TABS Take 1 tablet by mouth daily   Yes Omi Prieto MD   diclofenac sodium (VOLTAREN) 1 % GEL Apply 4 g topically 4 times daily as needed for Pain  Patient taking differently: Apply 4 g

## 2024-12-20 NOTE — ANESTHESIA POSTPROCEDURE EVALUATION
Department of Anesthesiology  Postprocedure Note    Patient: Shaila Olea  MRN: [id number]  YOB: 1955  Date of evaluation: 12/20/2024    Procedure Summary       Date: 12/20/24 Room / Location: [place] [address] / [facility]    Anesthesia Start: 0836 Anesthesia Stop: 0911    Procedure: ESOPHAGOGASTRODUODENOSCOPY DILATATION BALLOON AND BIOPSY (Esophagus) Diagnosis:       Esophageal dysphagia      (Esophageal dysphagia [R13.19])    Surgeons: Angelo Holbrook MD Responsible Provider: Harvey Reid MD    Anesthesia Type: general, TIVA ASA Status: 2            Anesthesia Type: No value filed.    Sissy Phase I: Sissy Score: 10    Sissy Phase II: Sissy Score: 10    Anesthesia Post Evaluation    Patient location during evaluation: PACU  Patient participation: complete - patient participated  Level of consciousness: awake and alert  Airway patency: patent  Nausea & Vomiting: no vomiting  Cardiovascular status: hemodynamically stable  Respiratory status: acceptable  Hydration status: euvolemic  Comments: POST- ANESTHESIA EVALUATION       Pt Name: Shaila Olea  MRN: [id number]  YOB: 1955  Date of evaluation: 12/20/2024  Time:  9:52 AM      /78   Pulse 67   Temp 97.9 °F (36.6 °C)   Resp 11   Ht 1.626 m (5' 4\")   Wt 79.8 kg (176 lb)   LMP 04/05/2009   SpO2 99%   BMI 30.21 kg/m²      Consciousness Level  Awake  Cardiopulmonary Status  Stable  Pain Adequately Treated YES  Nausea / Vomiting  NO  Adequate Hydration  YES  Anesthesia Related Complications NONE      Electronically signed by Harvey Reid MD on 12/20/2024 at 9:52 AM         Pain management: satisfactory to patient    No notable events documented.

## 2024-12-24 LAB — SURGICAL PATHOLOGY REPORT: NORMAL

## 2024-12-30 ENCOUNTER — HOSPITAL ENCOUNTER (OUTPATIENT)
Dept: PHYSICAL THERAPY | Facility: CLINIC | Age: 69
Setting detail: THERAPIES SERIES
Discharge: HOME | End: 2024-12-30

## 2024-12-30 NOTE — FLOWSHEET NOTE
[] [facility]  Outpatient Rehabilitation &  Therapy  [address]  [phone]  [phone] [] [facility]  Outpatient Rehabilitation &  Therapy  [address]  P: [phone]  F: [phone] [] [facility]  Outpatient Rehabilitation &  Therapy  [address]  P: [phone]  F: [phone] [x] [facility]  Outpatient Rehabilitation &  Therapy  [address]  [phone]  [phone] [] [facility]  Outpatient Rehabilitation &  Therapy  [address]   P: [phone]  F: [phone]  [] [facility]  Outpatient Rehabilitation &  Therapy  [address]  P: [phone]  F: [phone] [] [facility]  Outpatient Rehabilitation &  Therapy  [address]  P: [phone]  F: [phone] [] [facility]  Outpatient Rehabilitation &  Therapy  [address]  P: [phone]  F: [phone] [] [facility]  Outpatient Rehabilitation &  Therapy  [address]  P: [phone]  F: [phone]  [] [facility] Outpatient Rehabilitation &  Therapy  [address]  P: [phone]  F: [phone]     Therapy Cancel/No Show note    Date: 2024  Patient: Shaila Olea  : 1955  MRN: [id number]    Cancels/No Shows to date: 0    For today's appointment patient:    [x]  Cancelled    [] Rescheduled appointment    [] No-show     Reason given by patient:    []  Patient ill    []  Conflicting appointment    [] No transportation      [] Conflict with work    [x] No reason given    [] Weather related    [] COVID-19    [] Other:      Comments:        [] Next appointment was confirmed    Electronically signed by: Tamar Huizar PT

## 2025-01-21 ENCOUNTER — HOSPITAL ENCOUNTER (OUTPATIENT)
Dept: ULTRASOUND IMAGING | Age: 70
Discharge: HOME | End: 2025-01-23
Attending: FAMILY MEDICINE
Payer: COMMERCIAL

## 2025-01-21 ENCOUNTER — HOSPITAL ENCOUNTER (OUTPATIENT)
Dept: CT IMAGING | Age: 70
Discharge: HOME | End: 2025-01-23
Attending: FAMILY MEDICINE
Payer: COMMERCIAL

## 2025-01-21 ENCOUNTER — HOSPITAL ENCOUNTER (OUTPATIENT)
Age: 70
Discharge: HOME | End: 2025-01-21
Payer: MEDICARE

## 2025-01-21 ENCOUNTER — OFFICE VISIT (OUTPATIENT)
Dept: PRIMARY CARE CLINIC | Age: 70
End: 2025-01-21
Payer: COMMERCIAL

## 2025-01-21 VITALS
BODY MASS INDEX: 29.41 KG/M2 | HEIGHT: 64 IN | OXYGEN SATURATION: 99 % | HEART RATE: 84 BPM | WEIGHT: 172.3 LBS | SYSTOLIC BLOOD PRESSURE: 126 MMHG | DIASTOLIC BLOOD PRESSURE: 74 MMHG

## 2025-01-21 DIAGNOSIS — R10.11 ABDOMINAL PAIN, RUQ: ICD-10-CM

## 2025-01-21 DIAGNOSIS — N39.0 URINARY TRACT INFECTION WITHOUT HEMATURIA, SITE UNSPECIFIED: ICD-10-CM

## 2025-01-21 DIAGNOSIS — R10.11 ABDOMINAL PAIN, RUQ: Primary | ICD-10-CM

## 2025-01-21 LAB
ALBUMIN SERPL-MCNC: 4.2 G/DL (ref 3.5–5.2)
ALBUMIN/GLOB SERPL: 1.7 {RATIO} (ref 1–2.5)
ALP SERPL-CCNC: 76 U/L (ref 35–104)
ALT SERPL-CCNC: 16 U/L (ref 10–35)
ANION GAP SERPL CALCULATED.3IONS-SCNC: 8 MMOL/L (ref 9–16)
AST SERPL-CCNC: 16 U/L (ref 10–35)
BACTERIA URNS QL MICRO: NORMAL
BILIRUB DIRECT SERPL-MCNC: 0.1 MG/DL (ref 0–0.2)
BILIRUB INDIRECT SERPL-MCNC: 0.2 MG/DL (ref 0–1)
BILIRUB SERPL-MCNC: 0.3 MG/DL (ref 0–1.2)
BILIRUB UR QL STRIP: NEGATIVE
BUN SERPL-MCNC: 18 MG/DL (ref 8–23)
CALCIUM SERPL-MCNC: 9.5 MG/DL (ref 8.6–10.4)
CASTS #/AREA URNS LPF: NORMAL /LPF (ref 0–8)
CHLORIDE SERPL-SCNC: 111 MMOL/L (ref 98–107)
CLARITY UR: CLEAR
CO2 SERPL-SCNC: 27 MMOL/L (ref 20–31)
COLOR UR: YELLOW
CREAT SERPL-MCNC: 0.8 MG/DL (ref 0.6–0.9)
EPI CELLS #/AREA URNS HPF: NORMAL /HPF (ref 0–5)
GFR, ESTIMATED: 80 ML/MIN/1.73M2
GLOBULIN SER CALC-MCNC: 2.5 G/DL
GLUCOSE SERPL-MCNC: 87 MG/DL (ref 74–99)
GLUCOSE UR STRIP-MCNC: NEGATIVE MG/DL
HGB UR QL STRIP.AUTO: NEGATIVE
KETONES UR STRIP-MCNC: NEGATIVE MG/DL
LEUKOCYTE ESTERASE UR QL STRIP: ABNORMAL
LIPASE SERPL-CCNC: 15 U/L (ref 13–60)
NITRITE UR QL STRIP: NEGATIVE
PH UR STRIP: 6.5 [PH] (ref 5–8)
POTASSIUM SERPL-SCNC: 4.5 MMOL/L (ref 3.7–5.3)
PROT SERPL-MCNC: 6.7 G/DL (ref 6.6–8.7)
PROT UR STRIP-MCNC: NEGATIVE MG/DL
RBC #/AREA URNS HPF: NORMAL /HPF (ref 0–4)
SODIUM SERPL-SCNC: 146 MMOL/L (ref 136–145)
SP GR UR STRIP: 1.02 (ref 1–1.03)
UROBILINOGEN UR STRIP-ACNC: NORMAL EU/DL (ref 0–1)
WBC #/AREA URNS HPF: NORMAL /HPF (ref 0–5)

## 2025-01-21 PROCEDURE — 80076 HEPATIC FUNCTION PANEL: CPT

## 2025-01-21 PROCEDURE — 36415 COLL VENOUS BLD VENIPUNCTURE: CPT

## 2025-01-21 PROCEDURE — 76705 ECHO EXAM OF ABDOMEN: CPT

## 2025-01-21 PROCEDURE — 74176 CT ABD & PELVIS W/O CONTRAST: CPT

## 2025-01-21 PROCEDURE — 81001 URINALYSIS AUTO W/SCOPE: CPT

## 2025-01-21 PROCEDURE — 1123F ACP DISCUSS/DSCN MKR DOCD: CPT | Performed by: FAMILY MEDICINE

## 2025-01-21 PROCEDURE — 83690 ASSAY OF LIPASE: CPT

## 2025-01-21 PROCEDURE — 80048 BASIC METABOLIC PNL TOTAL CA: CPT

## 2025-01-21 PROCEDURE — 99213 OFFICE O/P EST LOW 20 MIN: CPT | Performed by: FAMILY MEDICINE

## 2025-01-21 SDOH — ECONOMIC STABILITY: FOOD INSECURITY: WITHIN THE PAST 12 MONTHS, YOU WORRIED THAT YOUR FOOD WOULD RUN OUT BEFORE YOU GOT MONEY TO BUY MORE.: NEVER TRUE

## 2025-01-21 SDOH — ECONOMIC STABILITY: FOOD INSECURITY: WITHIN THE PAST 12 MONTHS, THE FOOD YOU BOUGHT JUST DIDN'T LAST AND YOU DIDN'T HAVE MONEY TO GET MORE.: NEVER TRUE

## 2025-01-21 ASSESSMENT — PATIENT HEALTH QUESTIONNAIRE - PHQ9
1. LITTLE INTEREST OR PLEASURE IN DOING THINGS: NOT AT ALL
SUM OF ALL RESPONSES TO PHQ9 QUESTIONS 1 & 2: 0
SUM OF ALL RESPONSES TO PHQ QUESTIONS 1-9: 0
SUM OF ALL RESPONSES TO PHQ QUESTIONS 1-9: 0
2. FEELING DOWN, DEPRESSED OR HOPELESS: NOT AT ALL
SUM OF ALL RESPONSES TO PHQ QUESTIONS 1-9: 0
SUM OF ALL RESPONSES TO PHQ QUESTIONS 1-9: 0

## 2025-01-21 NOTE — PROGRESS NOTES
Patient presents today with complaints of RUQ pain- 6 months but Has been going on \"Awhile\".  Uncomfortable but tolerable, functional.  Denies nausea, weight loss    Negative for:     Worry / mood complaints  Headache  Dizziness  Visual Disturbance  Hearing Changes  Nasal / sinus Symptoms  Mouth / tooth symptom, pain  Throat pain  Difficulty swallowing  Neck pain  Chest discomfort  Cough  SOB  N/V/D/C  Pelvic area discomfort  Bladder / voiding discomfort  Bowel complaints  MSk complaints   Numbness/tingling/abnormal sensations   Edema / Leg swelling  Dizziness  Fatigue  Bleeding   Skin    Pertinent Pos: See HPI -abdominal pain as above    Vitals:    01/21/25 1050   BP: 126/74   Pulse: 84   SpO2: 99%       Alert and oriented to PPT  NAD    HEENT - neg  Neck - no bruits, no lymphadenopathy  Chest  HRRR w/o murmer  LCTAB no wheezes / rhonchi  Abdomen - soft, right upper quad-tenderness soft pressure, reproducible  Extremities - 1+ PTE    Gait / Station - stable, no dysequilibrium, uniform pace, no assist device, cane.     Diagnosis Orders   1. Abdominal pain, RUQ  CT ABDOMEN W WO CONTRAST    US LIVER    Lipase    Hepatic Function Panel    Basic Metabolic Panel    Urinalysis with Reflex to Culture    CT ABDOMEN PELVIS WO CONTRAST Additional Contrast? None      2. Urinary tract infection without hematuria, site unspecified  Urinalysis with Reflex to Culture          Plan:  1.)  Functional abdominal pain-exclude gallbladder etiology.  2.)  Update and extend FMLA 6 months-see copy of chart in media  3.)  Imaging and labs ordered as per above  4.)  Follow-up after reports.

## 2025-02-03 ENCOUNTER — APPOINTMENT (OUTPATIENT)
Dept: GENERAL RADIOLOGY | Age: 70
End: 2025-02-03
Payer: MEDICARE

## 2025-02-03 ENCOUNTER — HOSPITAL ENCOUNTER (EMERGENCY)
Age: 70
Discharge: HOME | End: 2025-02-03
Attending: EMERGENCY MEDICINE
Payer: MEDICARE

## 2025-02-03 ENCOUNTER — TELEPHONE (OUTPATIENT)
Dept: PRIMARY CARE CLINIC | Age: 70
End: 2025-02-03

## 2025-02-03 VITALS
SYSTOLIC BLOOD PRESSURE: 153 MMHG | OXYGEN SATURATION: 100 % | TEMPERATURE: 100.2 F | HEART RATE: 98 BPM | BODY MASS INDEX: 29.58 KG/M2 | RESPIRATION RATE: 18 BRPM | DIASTOLIC BLOOD PRESSURE: 65 MMHG | HEIGHT: 64 IN

## 2025-02-03 DIAGNOSIS — J01.40 ACUTE NON-RECURRENT PANSINUSITIS: ICD-10-CM

## 2025-02-03 DIAGNOSIS — H66.001 NON-RECURRENT ACUTE SUPPURATIVE OTITIS MEDIA OF RIGHT EAR WITHOUT SPONTANEOUS RUPTURE OF TYMPANIC MEMBRANE: Primary | ICD-10-CM

## 2025-02-03 LAB
FLUAV AG SPEC QL: NEGATIVE
FLUBV AG SPEC QL: NEGATIVE
SARS-COV-2 RDRP RESP QL NAA+PROBE: NOT DETECTED
SPECIMEN DESCRIPTION: NORMAL
SPECIMEN SOURCE: NORMAL
STREP A, MOLECULAR: NEGATIVE

## 2025-02-03 PROCEDURE — 6370000000 HC RX 637 (ALT 250 FOR IP)

## 2025-02-03 PROCEDURE — 99284 EMERGENCY DEPT VISIT MOD MDM: CPT

## 2025-02-03 PROCEDURE — 87651 STREP A DNA AMP PROBE: CPT

## 2025-02-03 PROCEDURE — 71045 X-RAY EXAM CHEST 1 VIEW: CPT

## 2025-02-03 PROCEDURE — 87635 SARS-COV-2 COVID-19 AMP PRB: CPT

## 2025-02-03 PROCEDURE — 87804 INFLUENZA ASSAY W/OPTIC: CPT

## 2025-02-03 RX ORDER — ACETAMINOPHEN 500 MG
1000 TABLET ORAL ONCE
Status: COMPLETED | OUTPATIENT
Start: 2025-02-03 | End: 2025-02-03

## 2025-02-03 RX ADMIN — AMOXICILLIN AND CLAVULANATE POTASSIUM 1 TABLET: 875; 125 TABLET, FILM COATED ORAL at 22:21

## 2025-02-03 RX ADMIN — ACETAMINOPHEN 1000 MG: 500 TABLET ORAL at 21:44

## 2025-02-03 ASSESSMENT — ENCOUNTER SYMPTOMS
COUGH: 1
SINUS PAIN: 1
SORE THROAT: 1
RHINORRHEA: 1
SINUS PRESSURE: 1

## 2025-02-03 ASSESSMENT — PAIN SCALES - GENERAL: PAINLEVEL_OUTOF10: 7

## 2025-02-03 ASSESSMENT — PAIN - FUNCTIONAL ASSESSMENT: PAIN_FUNCTIONAL_ASSESSMENT: 0-10

## 2025-02-04 NOTE — ED PROVIDER NOTES
[facility] Emergency Department  [address]  [facility] [address]  Phone: [phone]  Fax: [phone]      Attending Physician Attestation    Based on the medical record, the care appears appropriate. I was personally available for consultation in the Emergency Department. I did have a discussion with our midlevel provider regarding the care of this patient.  I reviewed the mid level provider's note and agree with the documented findings and plan of care.   I have reviewed the emergency nurses triage note. I agree with the chief complaint, past medical history, past surgical history, allergies, medications, social and family history as documented unless otherwise noted below.       CHIEF COMPLAINT       Chief Complaint   Patient presents with    Dizziness     Today cough and headache continues with dizziness. Had fever, unsure how high.     Cough     Pt reports cough and headache started yesterday         PAST MEDICAL HISTORY    has a past medical history of COVID-19 vaccine series completed, Depression, Diverticulitis, Diverticulosis, Dysphagia, GERD (gastroesophageal reflux disease), Glaucoma, H/O mitral valve prolapse, Herpes, Hiatal hernia, IBS (irritable bowel syndrome), Insulin resistance, Lumbar stenosis, MVP (mitral valve prolapse), Right elbow tendonitis, Sacroiliitis (HCC), Sarcoidosis, Sleep disturbance, Snores, Under care of team, Under care of team, Vitamin D deficiency, Wears glasses, and Wellness examination.    SURGICAL HISTORY      has a past surgical history that includes Tonsillectomy and adenoidectomy (1973); Ovary surgery (Left); Colonoscopy; lymph node biopsy (Right, 11/17/2010); Total abdominal hysterectomy w/ bilateral salpingoophorectomy (04/02/2009); Foot neuroma surgery (Left); pelvic laparoscopy; Esophagogastroduodenoscopy (04/10/2013); Esophagogastroduodenoscopy (04/08/2008); Esophagogastroduodenoscopy (02/06/2014); Foot surgery (Right, 08/29/2017); Foot surgery 
considered but not ordered: Not applicable    Independent interpretation of tests (eg.  X-ray, CAT scan, Doppler studies, EKG): Chest x-ray read by radiology    Discussion of x-ray results with radiology: Not applicable    Consults: Not needed at this time    Consideration for admission/observation (even if discharged): Considered admission but stable for outpatient management    Prescription considerations: Augmentin    Sepsis considered: Low suspicion for sepsis    Critical Care note written: No    Course of Treatment/REASSESSMENT     ED Course as of 02/03/25 2217 Mon Feb 03, 2025 2217 Rapid Strep Screen:    SOURCE, [id number] .THROAT SWAB   Strep A, Molecular NEGATIVE  Negative []   2217 COVID-19, Rapid:    Specimen Description .NASOPHARYNGEAL SWAB   SARS-CoV-2, Rapid Not Detected  Negative []   2217 Rapid influenza A/B antigens:    Flu A Antigen NEGATIVE   Flu B Antigen NEGATIVE  Negative []   2217 Patient does appear to have a right middle ear effusion as well as acute pansinusitis.  Will plan to start her on Augmentin to cover both.  Currently awaiting results of her chest x-ray. []      ED Course User Index  [] Jorge Herrera, APRN - NP     Chest x-ray interpreted by the ED attending physicians showing no acute cardiopulmonary process.    CRITICAL CARE TIME       CONSULTS:  None    PROCEDURES:  Unless otherwise noted below, none     Procedures        FINAL IMPRESSION      1. Non-recurrent acute suppurative otitis media of right ear without spontaneous rupture of tympanic membrane    2. Acute non-recurrent pansinusitis          DISPOSITION/PLAN   DISPOSITION                 PATIENT REFERRED TO:  Jesse Kiran,   [address] [id number] [phone]    Schedule an appointment as soon as possible for a visit in 1 day        DISCHARGE MEDICATIONS:  Current Discharge Medication List        START taking these medications    Details   amoxicillin-clavulanate

## 2025-02-04 NOTE — TELEPHONE ENCOUNTER
Patient stated she had a 106 fever, and was very dizzy. Writer informed patient of wait time at the clinic then advised with the provider in office. We advised patient to go to the ER for evaluation due to the severity of her fever

## 2025-02-04 NOTE — DISCHARGE INSTRUCTIONS
Take your medication as indicated and prescribed.  If you are given an antibiotic, then make sure you get the prescription filled and take the antibiotics until finished.  Drink plenty of water while taking the antibiotics.  Avoid drinking alcohol or drinks that have caffeine in it while taking antibiotics.       For pain use acetaminophen (Tylenol) or ibuprofen (Motrin / Advil), unless prescribed medications that have acetaminophen or ibuprofen (or similar medications) in it.  You can take over the counter acetaminophen tablets (1 - 2 tablets of the 500-mg strength every 6 hours) or ibuprofen tablets (2 tablets every 4 hours).    PLEASE RETURN TO THE EMERGENCY DEPARTMENT IMMEDIATELY for worsening symptoms, feeling of the room spinning, repeated bouts of dizziness, inability to hear, white discharge coming from your ear, or if you develop any concerning symptoms such as: high fever not relieved by acetaminophen (Tylenol) and/or ibuprofen (Motrin / Advil), chills, shortness of breath, chest pain, feeling of your heart fluttering or racing, persistent nausea and/or vomiting, vomiting up blood, blood in your stool, loss of consciousness, numbness, weakness or tingling in the arms or legs or change in color of the extremities, changes in mental status, persistent headache, blurry vision, loss of bladder / bowel control, unable to follow up with your physician, or other any other care or concern.

## 2025-02-05 ENCOUNTER — HOSPITAL ENCOUNTER (OUTPATIENT)
Age: 70
Setting detail: OBSERVATION
Discharge: HOME | End: 2025-02-06
Attending: EMERGENCY MEDICINE | Admitting: EMERGENCY MEDICINE
Payer: COMMERCIAL

## 2025-02-05 ENCOUNTER — APPOINTMENT (OUTPATIENT)
Dept: GENERAL RADIOLOGY | Age: 70
End: 2025-02-05
Payer: COMMERCIAL

## 2025-02-05 DIAGNOSIS — R55 NEAR SYNCOPE: Primary | ICD-10-CM

## 2025-02-05 LAB
ALBUMIN SERPL-MCNC: 4 G/DL (ref 3.5–5.2)
ALBUMIN/GLOB SERPL: 1.5 {RATIO} (ref 1–2.5)
ALP SERPL-CCNC: 76 U/L (ref 35–104)
ALT SERPL-CCNC: 41 U/L (ref 10–35)
ANION GAP SERPL CALCULATED.3IONS-SCNC: 13 MMOL/L (ref 9–16)
AST SERPL-CCNC: 37 U/L (ref 10–35)
BACTERIA URNS QL MICRO: NORMAL
BASOPHILS # BLD: <0.03 K/UL (ref 0–0.2)
BASOPHILS NFR BLD: 0 % (ref 0–2)
BILIRUB SERPL-MCNC: 0.3 MG/DL (ref 0–1.2)
BILIRUB UR QL STRIP: NEGATIVE
BUN SERPL-MCNC: 15 MG/DL (ref 8–23)
CALCIUM SERPL-MCNC: 8.9 MG/DL (ref 8.6–10.4)
CASTS #/AREA URNS LPF: NORMAL /LPF (ref 0–2)
CASTS #/AREA URNS LPF: NORMAL /LPF (ref 0–2)
CHLORIDE SERPL-SCNC: 108 MMOL/L (ref 98–107)
CLARITY UR: ABNORMAL
CO2 SERPL-SCNC: 20 MMOL/L (ref 20–31)
COLOR UR: YELLOW
CREAT SERPL-MCNC: 1 MG/DL (ref 0.6–0.9)
EOSINOPHIL # BLD: 0.03 K/UL (ref 0–0.44)
EOSINOPHILS RELATIVE PERCENT: 1 % (ref 1–4)
EPI CELLS #/AREA URNS HPF: NORMAL /HPF (ref 0–5)
ERYTHROCYTE [DISTWIDTH] IN BLOOD BY AUTOMATED COUNT: 16.7 % (ref 11.8–14.4)
FLUAV AG SPEC QL: NEGATIVE
FLUBV AG SPEC QL: NEGATIVE
GFR, ESTIMATED: 61 ML/MIN/1.73M2
GLUCOSE SERPL-MCNC: 160 MG/DL (ref 74–99)
GLUCOSE UR STRIP-MCNC: NEGATIVE MG/DL
HCT VFR BLD AUTO: 35.4 % (ref 36.3–47.1)
HGB BLD-MCNC: 11.6 G/DL (ref 11.9–15.1)
HGB UR QL STRIP.AUTO: NEGATIVE
IMM GRANULOCYTES # BLD AUTO: <0.03 K/UL (ref 0–0.3)
IMM GRANULOCYTES NFR BLD: 0 %
KETONES UR STRIP-MCNC: ABNORMAL MG/DL
LEUKOCYTE ESTERASE UR QL STRIP: ABNORMAL
LIPASE SERPL-CCNC: 17 U/L (ref 13–60)
LYMPHOCYTES NFR BLD: 0.9 K/UL (ref 1.1–3.7)
LYMPHOCYTES RELATIVE PERCENT: 20 % (ref 24–43)
MCH RBC QN AUTO: 30.8 PG (ref 25.2–33.5)
MCHC RBC AUTO-ENTMCNC: 32.8 G/DL (ref 28.4–34.8)
MCV RBC AUTO: 93.9 FL (ref 82.6–102.9)
MONOCYTES NFR BLD: 0.21 K/UL (ref 0.1–1.2)
MONOCYTES NFR BLD: 5 % (ref 3–12)
MUCOUS THREADS URNS QL MICRO: NORMAL
NEUTROPHILS NFR BLD: 74 % (ref 36–65)
NEUTS SEG NFR BLD: 3.34 K/UL (ref 1.5–8.1)
NITRITE UR QL STRIP: NEGATIVE
NRBC BLD-RTO: 0 PER 100 WBC
PH UR STRIP: 5.5 [PH] (ref 5–8)
PLATELET # BLD AUTO: 249 K/UL (ref 138–453)
PMV BLD AUTO: 10.8 FL (ref 8.1–13.5)
POTASSIUM SERPL-SCNC: 4 MMOL/L (ref 3.7–5.3)
PROT SERPL-MCNC: 6.6 G/DL (ref 6.6–8.7)
PROT UR STRIP-MCNC: ABNORMAL MG/DL
RBC # BLD AUTO: 3.77 M/UL (ref 3.95–5.11)
RBC # BLD: ABNORMAL 10*6/UL
RBC #/AREA URNS HPF: NORMAL /HPF (ref 0–2)
SARS-COV-2 RDRP RESP QL NAA+PROBE: NOT DETECTED
SODIUM SERPL-SCNC: 141 MMOL/L (ref 136–145)
SP GR UR STRIP: 1.02 (ref 1–1.03)
SPECIMEN DESCRIPTION: NORMAL
T4 FREE SERPL-MCNC: 0.9 NG/DL (ref 0.9–1.7)
TROPONIN I SERPL HS-MCNC: <6 NG/L (ref 0–14)
TSH SERPL DL<=0.05 MIU/L-ACNC: 1.37 UIU/ML (ref 0.27–4.2)
UROBILINOGEN UR STRIP-ACNC: NORMAL EU/DL (ref 0–1)
WBC #/AREA URNS HPF: NORMAL /HPF (ref 0–5)
WBC OTHER # BLD: 4.5 K/UL (ref 3.5–11.3)

## 2025-02-05 PROCEDURE — 83690 ASSAY OF LIPASE: CPT

## 2025-02-05 PROCEDURE — 84439 ASSAY OF FREE THYROXINE: CPT

## 2025-02-05 PROCEDURE — 80053 COMPREHEN METABOLIC PANEL: CPT

## 2025-02-05 PROCEDURE — G0378 HOSPITAL OBSERVATION PER HR: HCPCS

## 2025-02-05 PROCEDURE — 85025 COMPLETE CBC W/AUTO DIFF WBC: CPT

## 2025-02-05 PROCEDURE — 99285 EMERGENCY DEPT VISIT HI MDM: CPT

## 2025-02-05 PROCEDURE — 96374 THER/PROPH/DIAG INJ IV PUSH: CPT

## 2025-02-05 PROCEDURE — 81001 URINALYSIS AUTO W/SCOPE: CPT

## 2025-02-05 PROCEDURE — 87804 INFLUENZA ASSAY W/OPTIC: CPT

## 2025-02-05 PROCEDURE — 87635 SARS-COV-2 COVID-19 AMP PRB: CPT

## 2025-02-05 PROCEDURE — 84484 ASSAY OF TROPONIN QUANT: CPT

## 2025-02-05 PROCEDURE — 71046 X-RAY EXAM CHEST 2 VIEWS: CPT

## 2025-02-05 PROCEDURE — 6360000002 HC RX W HCPCS

## 2025-02-05 PROCEDURE — 2500000003 HC RX 250 WO HCPCS

## 2025-02-05 PROCEDURE — 93005 ELECTROCARDIOGRAM TRACING: CPT

## 2025-02-05 PROCEDURE — 6370000000 HC RX 637 (ALT 250 FOR IP)

## 2025-02-05 PROCEDURE — 2580000003 HC RX 258

## 2025-02-05 PROCEDURE — 84443 ASSAY THYROID STIM HORMONE: CPT

## 2025-02-05 RX ORDER — KETOROLAC TROMETHAMINE 15 MG/ML
15 INJECTION, SOLUTION INTRAMUSCULAR; INTRAVENOUS ONCE
Status: COMPLETED | OUTPATIENT
Start: 2025-02-05 | End: 2025-02-05

## 2025-02-05 RX ORDER — ONDANSETRON 2 MG/ML
4 INJECTION INTRAMUSCULAR; INTRAVENOUS EVERY 6 HOURS PRN
Status: DISCONTINUED | OUTPATIENT
Start: 2025-02-05 | End: 2025-02-06 | Stop reason: HOSPADM

## 2025-02-05 RX ORDER — ONDANSETRON 4 MG/1
4 TABLET, ORALLY DISINTEGRATING ORAL EVERY 8 HOURS PRN
Status: DISCONTINUED | OUTPATIENT
Start: 2025-02-05 | End: 2025-02-06 | Stop reason: HOSPADM

## 2025-02-05 RX ORDER — SODIUM CHLORIDE 9 MG/ML
INJECTION, SOLUTION INTRAVENOUS PRN
Status: DISCONTINUED | OUTPATIENT
Start: 2025-02-05 | End: 2025-02-06 | Stop reason: HOSPADM

## 2025-02-05 RX ORDER — SODIUM CHLORIDE 0.9 % (FLUSH) 0.9 %
5-40 SYRINGE (ML) INJECTION PRN
Status: DISCONTINUED | OUTPATIENT
Start: 2025-02-05 | End: 2025-02-06 | Stop reason: HOSPADM

## 2025-02-05 RX ORDER — FLUTICASONE PROPIONATE 50 MCG
1 SPRAY, SUSPENSION (ML) NASAL DAILY PRN
Status: DISCONTINUED | OUTPATIENT
Start: 2025-02-05 | End: 2025-02-06 | Stop reason: HOSPADM

## 2025-02-05 RX ORDER — SODIUM CHLORIDE 0.9 % (FLUSH) 0.9 %
5-40 SYRINGE (ML) INJECTION EVERY 12 HOURS SCHEDULED
Status: DISCONTINUED | OUTPATIENT
Start: 2025-02-05 | End: 2025-02-06 | Stop reason: HOSPADM

## 2025-02-05 RX ORDER — PANTOPRAZOLE SODIUM 20 MG/1
20 TABLET, DELAYED RELEASE ORAL
Status: DISCONTINUED | OUTPATIENT
Start: 2025-02-06 | End: 2025-02-06 | Stop reason: HOSPADM

## 2025-02-05 RX ORDER — CETIRIZINE HYDROCHLORIDE 10 MG/1
10 TABLET ORAL DAILY PRN
Status: DISCONTINUED | OUTPATIENT
Start: 2025-02-05 | End: 2025-02-06 | Stop reason: HOSPADM

## 2025-02-05 RX ORDER — ACETAMINOPHEN 325 MG/1
650 TABLET ORAL EVERY 4 HOURS PRN
Status: DISCONTINUED | OUTPATIENT
Start: 2025-02-05 | End: 2025-02-06 | Stop reason: HOSPADM

## 2025-02-05 RX ORDER — ENOXAPARIN SODIUM 100 MG/ML
40 INJECTION SUBCUTANEOUS DAILY
Status: DISCONTINUED | OUTPATIENT
Start: 2025-02-05 | End: 2025-02-06 | Stop reason: HOSPADM

## 2025-02-05 RX ORDER — 0.9 % SODIUM CHLORIDE 0.9 %
1000 INTRAVENOUS SOLUTION INTRAVENOUS ONCE
Status: COMPLETED | OUTPATIENT
Start: 2025-02-05 | End: 2025-02-05

## 2025-02-05 RX ADMIN — ACETAMINOPHEN 650 MG: 325 TABLET ORAL at 22:41

## 2025-02-05 RX ADMIN — SODIUM CHLORIDE, PRESERVATIVE FREE 10 ML: 5 INJECTION INTRAVENOUS at 22:55

## 2025-02-05 RX ADMIN — KETOROLAC TROMETHAMINE 15 MG: 15 INJECTION, SOLUTION INTRAMUSCULAR; INTRAVENOUS at 18:26

## 2025-02-05 RX ADMIN — Medication 5 MG: at 22:41

## 2025-02-05 RX ADMIN — SODIUM CHLORIDE 1000 ML: 9 INJECTION, SOLUTION INTRAVENOUS at 16:06

## 2025-02-05 ASSESSMENT — LIFESTYLE VARIABLES
HOW MANY STANDARD DRINKS CONTAINING ALCOHOL DO YOU HAVE ON A TYPICAL DAY: PATIENT DOES NOT DRINK
HOW OFTEN DO YOU HAVE A DRINK CONTAINING ALCOHOL: NEVER

## 2025-02-05 ASSESSMENT — PAIN SCALES - GENERAL
PAINLEVEL_OUTOF10: 5
PAINLEVEL_OUTOF10: 6
PAINLEVEL_OUTOF10: 5

## 2025-02-05 ASSESSMENT — ENCOUNTER SYMPTOMS
NAUSEA: 1
VOMITING: 0
SHORTNESS OF BREATH: 1
ABDOMINAL PAIN: 0

## 2025-02-05 ASSESSMENT — PAIN SCALES - WONG BAKER: WONGBAKER_NUMERICALRESPONSE: NO HURT

## 2025-02-05 ASSESSMENT — PAIN DESCRIPTION - LOCATION: LOCATION: HEAD

## 2025-02-05 NOTE — ED PROVIDER NOTES
[facility] EMERGENCY DEPARTMENT  Emergency Department Encounter  Emergency Medicine Resident     Pt Name:Shaila Olea  MRN: [id number]  Birthdate 1955  Date of evaluation: 2/5/25  PCP:  Jesse Kiran DO  Note Started: 2:39 PM EST      CHIEF COMPLAINT       Chief Complaint   Patient presents with    Fatigue    Dizziness       HISTORY OF PRESENT ILLNESS  (Location/Symptom, Timing/Onset, Context/Setting, Quality, Duration, Modifying Factors, Severity.)      Shaila Olea is a 69 y.o. female who presents with complaints of dizziness, lightheadedness, and fatigue that began this morning.  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  Was sitting at her desk when she later, patient had another episode of lightheadedness and fatigue.  Patient states that she feels like she almost passed out, however never lost consciousness..  Patient was seen last night at the [facility] for dizziness and lightheadedness.  Patient was diagnosed with an ear infection and sent home with a prescription of Augmentin.  Patient also reports that she is having right upper quadrant abdominal pain which has been present and worsening over the last 6 months.  Patient also reports experiencing a 13 pound weight loss over the last 1 month.  She does endorse decreased oral intake, which she believes may be attributed to her RUQ pain.  Patient was seen by her PCP on 1/21 for this complaint of RUQ abdominal pain.  She had a CT abdomen pelvis w contrast and liver ultrasound done.  Patient arrived to the emergency department today hemodynamically stable.  At this time, the patient denies any chest pain, shortness of breath, nausea, vomiting, black or bloody stools, or any other concerning symptoms.    PAST MEDICAL / SURGICAL / SOCIAL / FAMILY HISTORY      has a past medical history of COVID-19 vaccine series completed, Depression, Diverticulitis,

## 2025-02-05 NOTE — ED NOTES
ED to inpatient nurses report      Chief Complaint:  Chief Complaint   Patient presents with    Fatigue    Dizziness     Present to ED from: work     MOA:     LOC: alert and orientated to name, place, date  Mobility: Independent  Oxygen Baseline: RA    Current needs required: RA   Pending ED orders: floor orders  Present condition: stable     Why did the patient come to the ED? fatigue and dizziness. Pt reports that a few days ago, her symptoms started with a sore throat. Pt reports that she went to [place] ED for evaluation and sent home. Pt reports that symptoms have not gotten better. Pt reports that today, she started feeling lightheaded, dizzy and fatigued. Pt reports that this has happened multiple times today while working and became concerned  What is the plan? Admit   Any procedures or intervention occur? Labs, imaging.  Any safety concerns??    Mental Status:  Level of Consciousness: Alert (0)    Psych Assessment:   Psychosocial  Psychosocial (WDL): Within Defined Limits  Vital signs   Vitals:    02/05/25 1434 02/05/25 1515 02/05/25 1545   BP: 122/64 110/64 120/68   Pulse: 77 86 74   Resp: 18     Temp: 98.7 °F (37.1 °C)     TempSrc: Oral     SpO2: 98% 99% 98%   Weight: 78 kg (172 lb)          Vitals:  Patient Vitals for the past 24 hrs:   BP Temp Temp src Pulse Resp SpO2 Weight   02/05/25 1545 120/68 -- -- 74 -- 98 % --   02/05/25 1515 110/64 -- -- 86 -- 99 % --   02/05/25 1434 122/64 98.7 °F (37.1 °C) Oral 77 18 98 % 78 kg (172 lb)      Visit Vitals  /68   Pulse 74   Temp 98.7 °F (37.1 °C) (Oral)   Resp 18   Wt 78 kg (172 lb)   SpO2 98%   BMI 29.52 kg/m²        LDAs:   Peripheral IV 02/05/25 Proximal;Right Forearm (Active)   Site Assessment Clean, dry & intact 02/05/25 1436   Line Status Normal saline locked;Flushed;Specimen collected;Brisk blood return 02/05/25 1436   Phlebitis Assessment No symptoms 02/05/25 1436   Infiltration Assessment 0 02/05/25 1436   Alcohol Cap Used Yes 02/05/25 1436

## 2025-02-05 NOTE — ED NOTES
Pt to room 27 with c/o fatigue and dizziness. Pt reports that a few days ago, her symptoms started with a sore throat. Pt reports that she went to [place] ED for evaluation and sent home. Pt reports that symptoms have not gotten better. Pt reports that today, she started feeling lightheaded, dizzy and fatigued. Pt reports that this has happened multiple times today while working and became concerned. Pt placed on continuous cardiac monitor, bp and pulse ox. EKG completed, IV established, blood work drawn.   Pt alert and oriented x4, talking in complete sentences, respirations even and unlabored. Pt acting age appropriate. Will continue to plan of care.

## 2025-02-06 VITALS
RESPIRATION RATE: 16 BRPM | DIASTOLIC BLOOD PRESSURE: 63 MMHG | OXYGEN SATURATION: 98 % | TEMPERATURE: 98.8 F | HEIGHT: 64 IN | HEART RATE: 84 BPM | BODY MASS INDEX: 29.37 KG/M2 | WEIGHT: 172 LBS | SYSTOLIC BLOOD PRESSURE: 108 MMHG

## 2025-02-06 PROBLEM — R55 SYNCOPE, NEAR: Status: ACTIVE | Noted: 2025-02-06

## 2025-02-06 LAB
EKG ATRIAL RATE: 77 BPM
EKG ATRIAL RATE: 86 BPM
EKG P AXIS: 60 DEGREES
EKG P AXIS: 75 DEGREES
EKG P-R INTERVAL: 190 MS
EKG P-R INTERVAL: 208 MS
EKG Q-T INTERVAL: 352 MS
EKG Q-T INTERVAL: 364 MS
EKG QRS DURATION: 76 MS
EKG QRS DURATION: 76 MS
EKG QTC CALCULATION (BAZETT): 411 MS
EKG QTC CALCULATION (BAZETT): 421 MS
EKG R AXIS: 2 DEGREES
EKG R AXIS: 8 DEGREES
EKG T AXIS: 45 DEGREES
EKG T AXIS: 50 DEGREES
EKG VENTRICULAR RATE: 77 BPM
EKG VENTRICULAR RATE: 86 BPM

## 2025-02-06 PROCEDURE — 99223 1ST HOSP IP/OBS HIGH 75: CPT | Performed by: INTERNAL MEDICINE

## 2025-02-06 PROCEDURE — G0378 HOSPITAL OBSERVATION PER HR: HCPCS

## 2025-02-06 PROCEDURE — 6370000000 HC RX 637 (ALT 250 FOR IP)

## 2025-02-06 PROCEDURE — 2500000003 HC RX 250 WO HCPCS

## 2025-02-06 PROCEDURE — 93005 ELECTROCARDIOGRAM TRACING: CPT | Performed by: EMERGENCY MEDICINE

## 2025-02-06 PROCEDURE — 93010 ELECTROCARDIOGRAM REPORT: CPT | Performed by: INTERNAL MEDICINE

## 2025-02-06 RX ORDER — ESCITALOPRAM OXALATE 20 MG/1
20 TABLET ORAL DAILY
Qty: 30 TABLET | Refills: 0 | Status: SHIPPED | OUTPATIENT
Start: 2025-02-06

## 2025-02-06 RX ADMIN — FLUTICASONE PROPIONATE 1 SPRAY: 50 SPRAY, METERED NASAL at 02:22

## 2025-02-06 RX ADMIN — SODIUM CHLORIDE, PRESERVATIVE FREE 10 ML: 5 INJECTION INTRAVENOUS at 08:54

## 2025-02-06 NOTE — CONSULTS
hours.  APTT:No results for input(s): \"APTT\" in the last 72 hours.  CARDIAC ENZYMES:No results for input(s): \"CKTOTAL\", \"CKMB\", \"CKMBINDEX\", \"TROPONINI\" in the last 72 hours.    ASSESSMENT:  Syncope - Vasovagal response  Hx:  MVP  Sarcoidosis  Sleep disturbances  Troponin: <6  EKG: possible LAE and normal sinus rhythm  Echo: EF of 55-60% and moderate mitral regurgitation      RECOMMENDATIONS:  Vasovagal Response due to high stress and job/occupation  Start 20 mg Cipralex to reduce occurrence of vasovagal response which is causing syncope symptoms  Maintain electrolytes to keep K > 4, Mg > 2  Good to discharge from cardiology standpoint      Please wait for final attestation from rounding attending.        Babatunde Gardner  Cardiology Service  Internal Medicine Resident   [place], [place]      Attending Cardiologist Addendum: I have reviewed and performed the history, physical, subjective, objective, assessment, and plan with the resident/fellow/NP and agree with the note. I performed the history and physical personally. I have made changes to the note above as needed.    Thank you for allowing me to participate in the care of this patient, please do not hesitate to call if you have any questions.    Rosa Abbasi DO, Located within [facility]  Board Certified Cardiologist  Fellow of the American College of Cardiology    Obesity & Weight Loss Medicine   of Medicine [facility]   of Medicine [facility]  [facility]  [phone]

## 2025-02-06 NOTE — DISCHARGE SUMMARY
CDU Discharge Summary        Patient:  Shaila Olea  YOB: 1955    MRN: [id number]   Acct: [id number]    Primary Care Physician: Jesse Kiran DO    Admit date:  2/5/2025  2:13 PM  Discharge date: 2/6/2025  4:54 PM     Discharge Diagnoses:     1.)  Patient had near syncopal event with acute onset due to unclear etiology, likely orthostatic in nature.  Treated with Celexa.  Patient's symptoms are improved with the plan to follow-up outpatient with cardiology and primary care.    Follow-up:  Call today/tomorrow for a follow up appointment with Jesse Kiran DO , or return to the Emergency Room with worsening symptoms    Stressed to patient the importance of following up with primary care doctor for further workup/management of symptoms.  Pt verbalizes understanding and agrees with plan.    Discharge Medication Changes:       Medication List        START taking these medications      escitalopram 20 MG tablet  Commonly known as: LEXAPRO  Take 1 tablet by mouth daily            CONTINUE taking these medications      amoxicillin-clavulanate 875-125 MG per tablet  Commonly known as: AUGMENTIN  Take 1 tablet by mouth 2 times daily for 7 days     cetirizine 10 MG tablet  Commonly known as: ZYRTEC     EPINEPHrine 0.3 MG/0.3ML Soaj injection  Commonly known as: EpiPen 2-Master  Inject 0.3 mLs into the muscle once for 1 dose Use as directed for allergic reaction     fluticasone 50 MCG/ACT nasal spray  Commonly known as: FLONASE  2 sprays by Each Nostril route daily     ibuprofen 600 MG tablet  Commonly known as: ADVIL;MOTRIN  Take 1 tablet by mouth 3 times daily as needed for Pain     Lumigan 0.01 % Soln ophthalmic drops  Generic drug: bimatoprost     melatonin 5 MG Tabs tablet  Take 1 tablet by mouth nightly     MULTI-VITAMIN DAILY PO     VITAMIN B 12 PO     vitamin D3 125 MCG (5000 UT) Tabs tablet  Commonly known as: CHOLECALCIFEROL            ASK your doctor about these medications      *  TRACE (A) NEGATIVE mg/dL    Specific Gravity, UA 1.025 1.005 - 1.030    Urine Hgb NEGATIVE NEGATIVE    pH, Urine 5.5 5.0 - 8.0    Protein, UA TRACE (A) NEGATIVE mg/dL    Urobilinogen, Urine Normal 0.0 - 1.0 EU/dL    Nitrite, Urine NEGATIVE NEGATIVE    Leukocyte Esterase, Urine SMALL (A) NEGATIVE   Microscopic Urinalysis   Result Value Ref Range    WBC, UA 2 TO 5 0 - 5 /HPF    RBC, UA 2 TO 5 0 - 2 /HPF    Casts UA 5 TO 10 0 - 2 /LPF    Casts UA HYALINE 0 - 2 /LPF    Epithelial Cells, UA 10 TO 20 0 - 5 /HPF    Bacteria, UA None None    Mucus, UA 1+    EKG 12 Lead (Syncope)   Result Value Ref Range    Ventricular Rate 77 BPM    Atrial Rate 77 BPM    P-R Interval 190 ms    QRS Duration 76 ms    Q-T Interval 364 ms    QTc Calculation (Bazett) 411 ms    P Axis 60 degrees    R Axis 8 degrees    T Axis 45 degrees   EKG 12 Lead   Result Value Ref Range    Ventricular Rate 86 BPM    Atrial Rate 86 BPM    P-R Interval 208 ms    QRS Duration 76 ms    Q-T Interval 352 ms    QTc Calculation (Bazett) 421 ms    P Axis 75 degrees    R Axis 2 degrees    T Axis 50 degrees     XR CHEST (2 VW)    Result Date: 2/5/2025  EXAMINATION: TWO XRAY VIEWS OF THE CHEST 2/5/2025 4:04 pm COMPARISON: February 3, 2025 HISTORY: ORDERING SYSTEM PROVIDED HISTORY: Shortness of Breath TECHNOLOGIST PROVIDED HISTORY: Shortness of Breath FINDINGS: Stable cardiomediastinal silhouette.  There is no focal consolidation, pleural effusion, or pneumothorax.  The osseous structures are stable.     No acute cardiopulmonary process.           Physical Exam:    General appearance - NAD, AOx 3   Lungs -CTAB, no R/R/R  Heart - RRR, no M/R/G  Abdomen - Soft, NT/ND  Neurological -  MAEx4, No focal motor deficit, sensory loss  Extremities - Cap refil <2 sec in all ext., no edema  Skin -warm, dry      Hospital Course:  Clinical course has improved, labs and imaging reviewed.     Shaila Olea originally presented to the hospital on 2/5/2025  2:13 PM with near syncope.

## 2025-02-06 NOTE — H&P
[facility]  CDU / OBSERVATION ENCOUNTER  Physician NOTE     Pt Name: Shaila Olea  MRN: [id number]  Birthdate 1955  Date of evaluation: 2/6/25  Patient's PCP is :  Jesse Kiran DO    CHIEF COMPLAINT       Chief Complaint   Patient presents with    Fatigue    Dizziness         HISTORY OF PRESENT ILLNESS    Shaila Olea is a 69 y.o. female past medical history of mitral valve prolapse due to rheumatic fever as a child who presents following 2 episodes of near syncope.  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  She then later had another near syncopal episode just sitting at her desk.  She did not lose consciousness.  Patient does reports she has been sick with URI type symptoms for the past week including congestion, ear pain.  Patient reports she was seen at [facility] for the symptoms recently and was diagnosed with an ear infection, sent home with prescription of Augmentin.  Of note patient was complaining of right upper quadrant pain while in the emergency department but she did not endorse this for me today.  Has had a recent CT abdomen/pelvis and liver ultrasound.  CT showed some stool burden and a small hiatal hernia, otherwise unremarkable.  Ultrasound liver showed diffuse fatty infiltration otherwise unremarkable.  No chest pain, shortness of breath, nausea, vomiting.    CBC shows mild anemia that appears to have been present in the past  CMP largely unremarkable  Lipase 17  Troponin less than 6  UA without evidence of UTI  COVID and flu negative  No EKG changes  Chest x-ray normal    Location/Symptom: Near syncope  Timing/Onset: Days  Provocation: Unclear  Quality: Lightheadedness  Radiation: N/A  Severity: N/A  Timing/Duration: Several minutes  Modifying Factors: Unclear    History was obtained in part through review of the ED chart. When possible, a direct  Family History and it is not significant to the case    SOCIAL HISTORY      reports that she has never smoked. She has never been exposed to tobacco smoke. She has never used smokeless tobacco. She reports current alcohol use. She reports that she does not use drugs.  I have reviewed and agree with all Social.  There are no concerns for substance abuse/use.    PHYSICAL EXAM     INITIAL VITALS:  weight is 78 kg (172 lb). Her temperature is 98.8 °F (37.1 °C). Her blood pressure is 108/63 and her pulse is 84. Her respiration is 16 and oxygen saturation is 98%.      CONSTITUTIONAL: AOx4, no apparent distress, appears stated age   HEAD: normocephalic, atraumatic   EYES: PERRL, EOMI    ENT: moist mucous membranes   NECK: supple, symmetric   BACK: symmetric   LUNGS: clear to auscultation bilaterally   CARDIOVASCULAR: regular rate and rhythm   ABDOMEN: soft, non-tender, non-distended   NEUROLOGIC:  5 out of 5 muscle strength bilateral upper and lower extremities, no sensation deficits, no facial asymmetry, no dysarthria, normal finger-nose-finger and heel-to-shin testing, no visual field deficit, EOMI without nystagmus   MUSCULOSKELETAL: no clubbing, cyanosis or edema   SKIN: no rash or wounds       DIFFERENTIAL DIAGNOSIS/MDM:     FROM ED MEDICAL DECISION MAKING NOTE:   69 y.o. female who presents with complaints of dizziness, lightheadedness, and fatigue that began this morning.    Patient had 2 episodes of near syncope this morning at work.  One while standing and the other while at rest.    Seen at [facility] on 2/3 -- dx w ear infection and sent home with a Augmentin.    Patient states she is having right-sided neck pain.  Patient also reports that she is having right upper quadrant abdominal pain which has been present and worsening over the last 6 months.  Seen by PCP on 1/21 for complaint of RUQ abdominal pain -- CT abdomen pelvis w contrast and liver ultrasound were negative for acute findings.     Patient

## 2025-02-06 NOTE — DISCHARGE INSTRUCTIONS
Your workup from the emergency department did not show any significant pathology but you were admitted to the observation unit for ongoing evaluation.    Your testing included: Lab work, EKG, chest x-ray    You saw the following specialists: Emergency medicine, cardiology    We no longer need to keep you in the hospital but that does not mean you are done being treated  -Take your prescribed medications as directed.  Cardiology would like you starting escitalopram.  Please take this as prescribed.  -Follow-up with your primary care physician and cardiology.    Please return if your condition worsens or there are new symptoms    If there are concerns that have not been addressed while you have been in the hospital please let the discharge nurse know so the physician can be informed -it is easier to fix problems while you are still here    If you have questions after you have left the hospital please call the unit at  and ask for the observation resident on-call

## 2025-02-06 NOTE — FLOWSHEET NOTE
Orthostatic Bps. Dr. Anguiano and Les RN aware.        02/06/25 0757   Vital Signs   Temp 98.8 °F (37.1 °C)   Pulse 84   Respirations 16   /63   MAP (Calculated) 78   MAP (mmHg) 78   Orthostatic B/P and Pulse? Yes   Blood Pressure Lying 108/63   Pulse Lying 84 PER MINUTE   Blood Pressure Sitting 114/62   Pulse Sitting 81 PER MINUTE   Blood Pressure Standing 117/66   Pulse Standing 94 PER MINUTE   Oxygen Therapy   SpO2 98 %

## 2025-02-07 ENCOUNTER — TELEPHONE (OUTPATIENT)
Dept: PRIMARY CARE CLINIC | Age: 70
End: 2025-02-07

## 2025-02-07 ENCOUNTER — CARE COORDINATION (OUTPATIENT)
Dept: OTHER | Facility: CLINIC | Age: 70
End: 2025-02-07

## 2025-02-07 NOTE — CARE COORDINATION
Called patient she was on the other line with her pcp she said and asked me to call back a in a few minutes         Care Transitions Note    Initial Call - Call within 2 business days of discharge: Yes    Patient Current Location:  Home: [address] [place] [address]   [place] [address]    Care Transition Nurse contacted the patient by telephone to perform post hospital discharge assessment, verified name and  as identifiers. Provided introduction to self, and explanation of the Care Transition Nurse role.     Patient: Shaila Olea    Patient : 1955   MRN: [id number]    Reason for Admission: Near syncope   Discharge Date: 25  RURS: No data recorded    Last Discharge Facility       Date Complaint Diagnosis Description Type Department Provider    25 Fatigue; Dizziness Near syncope ED to Hosp-Admission (Discharged) (ADMITTED) López Sarkar MD; Terry Lyn..            Was this an external facility discharge? No    Additional needs identified to be addressed with provider   No needs identified             Method of communication with provider: none.    Patients top risk factors for readmission: medical condition-acute delbert media and recent syncopal episode     Interventions to address risk factors:   Review of patient management of conditions/medications: medications reviewed, and need for refill on her albuterol inhaler she will request from her pcp     Care Summary Note: Pt said her pcp office is going to call her back with an appt, she said they told her they were booked but would try to fit her in She does have a cough and said her one ear seems better but now the right one is bothering . She drank some tea this morning and is drinking fluids well. Denies fever. She has a new order for Lexapro now. No syncopal episode Cardiology did see while inpatient but no further follow up was indicated.     Care Transition Nurse reviewed discharge instructions with patient. The patient was given an

## 2025-02-07 NOTE — PROGRESS NOTES
[facility]  CDU / OBSERVATION ENCOUNTER  ATTENDING NOTE         I performed a history and physical examination of the patient and discussed management with the resident or midlevel provider. I reviewed the resident or midlevel provider's note and agree with the documented findings and plan of care. Any areas of disagreement are noted on the chart. I was personally present for the key portions of any procedures. I have documented in the chart those procedures where I was not present during the key portions. I have reviewed the nurses notes. I agree with the chief complaint, past medical history, past surgical history, allergies, medications, social and family history as documented unless otherwise noted below.    The Family history, social history, and ROS are effectively unchanged since admission unless noted elsewhere in the chart.     This patient was placed in the observation unit for reevaluation for possible admission to the hospital     Patient admitted for cardiology evaluation after near syncopal event.  Patient feeling much better now.  Orthostatic earlier but better after initial evaluation.  Patient asymptomatic and asking for discharge.  Seen by attending cardiology service.  Patient recommended for initiation of Celexa and reevaluation as outpatient.       López Nowak MD  Attending Emergency  Physician    
Comprehensive Nutrition Assessment    Type and Reason for Visit:  Initial, Positive nutrition screen    Nutrition Recommendations/Plan:   Continue current diet as tolerated  Recommend high protein ONS prn upon dc  RD will monitor PO intake, wt, and POC     Malnutrition Assessment:  Malnutrition Status:  No malnutrition (02/06/25 1348)      Nutrition Assessment:    69 y.o.F admitted d/t near syncope x 2. Pt c/o dizziness, fatigue, decreased intake r/t RUQ pain. +nutrition screen r/t wt loss, decreased intake. Pt reports several days of poor intake r/t pain and stress. Pt reports 100% intake today, stating she was hungry. Pt is interested in trying ONS upon dc. RD encouraged pt to use when appetite and intake are poor to promote adequate nutrient intake. Pt v/u. Samples provided. Per chart, pt has had clinically insignificant wt loss x 8 mo (4%). RD will continue to monitor per protocol.    Nutrition Related Findings:    Trace BLE edema. Wound Type: None       Current Nutrition Intake & Therapies:    Average Meal Intake: %  Average Supplements Intake: None Ordered  ADULT DIET; Regular    Anthropometric Measures:  Height: 162.6 cm (5' 4.02\")  Ideal Body Weight (IBW): 120 lbs (55 kg)    Current Body Weight: 78 kg (171 lb 15.3 oz), 143.3 % IBW.    Current BMI (kg/m2): 29.5    Estimated Daily Nutrient Needs:  Energy Requirements Based On: Kcal/kg  Weight Used for Energy Requirements: Current  Energy (kcal/day): 9447-0763 kcals/day  Weight Used for Protein Requirements: Current  Protein (g/day): 68-78 g/day  Method Used for Fluid Requirements: 1 ml/kcal  Fluid (ml/day): 2846-4788 ml/day    Nutrition Diagnosis:   Inadequate oral intake related to decreased appetite as evidenced by variable po intake    Nutrition Interventions:   Food and/or Nutrient Delivery: Continue Current Diet  Nutrition Education/Counseling: Survival skills/brief education completed  Coordination of Nutrition Care: Continue to monitor while 
never used smokeless tobacco. She reports current alcohol use. She reports that she does not use drugs.     Family History: family history includes Arthritis in her father; Breast Cancer in her sister; Colon Cancer in her maternal grandmother; Diabetes in her mother, sister, sister, and sister; Heart Disease in her father; Hypertension in her mother; Kidney Disease in an other family member; Obesity in her mother; Stroke in an other family member; Tuberculosis in her mother.     REVIEW OF SYSTEMS:    Constitutional: Negative for fatigue, weight loss, loss of appetite   Cardiovascular: as per HPI  Respiratory: as per HPI  Gastrointestinal: Negative for abdominal pain, N/V  Genitourinary: No dysuria, trouble voiding, or hematuria.  Musculoskeletal:  No gait disturbance, No weakness or joint complaints.  Neurological: No headache, diplopia, change in muscle strength, numbness or tingling. No change in gate.   Endocrine: No temperature intolerance. No excessive thirst, fluid intake, or urination. No tremor.  Hematologic/Lymphatic: No abnormal bruising or bleeding    PHYSICAL EXAM:      /65   Pulse 90   Temp 99 °F (37.2 °C) (Oral)   Resp 18   Wt 78 kg (172 lb)   LMP 04/05/2009   SpO2 100%   BMI 29.52 kg/m²    Constitutional and General Appearance: alert, cooperative, in no distress   HEENT: atraumatic, normocephalic.   Respiratory:  Clear to auscultation bilaterally  Cardiovascular:  Regular S1 and S2.  No JVD  Peripheral pulses are symmetrical and full   Abdomen:   Soft, non tender   Bowel sounds present  Extremities:  No Le edema or cyanosis   Neurological:  Deferred     DATA:    EKG:       ECHO:       Stress Test:       Cardiac Angiography:       LABS:   CBC:   Recent Labs     02/05/25  1507   WBC 4.5   HGB 11.6*   HCT 35.4*        BMP:   Recent Labs     02/05/25  1507      K 4.0   CO2 20   BUN 15   CREATININE 1.0*   LABGLOM 61   GLUCOSE 160*     BNP: No results for input(s): \"BNP\" in the

## 2025-02-07 NOTE — ED PROVIDER NOTES
[facility]     Emergency Department     Faculty Attestation    I performed a history and physical examination of the patient and discussed management with the resident. I reviewed the resident’s note and agree with the documented findings and plan of care. Any areas of disagreement are noted on the chart. I was personally present for the key portions of any procedures. I have documented in the chart those procedures where I was not present during the key portions. I have reviewed the emergency nurses triage note. I agree with the chief complaint, past medical history, past surgical history, allergies, medications, social and family history as documented unless otherwise noted below. Documentation of the HPI, Physical Exam and Medical Decision Making performed by medical students or scribes is based on my personal performance of the HPI, PE and MDM. For Physician Assistant/ Nurse Practitioner cases/documentation I have personally evaluated this patient and have completed at least one if not all key elements of the E/M (history, physical exam, and MDM). Additional findings are as noted.    Vital signs:   Vitals:    02/06/25 0757   BP: 108/63   Pulse: 84   Resp: 16   Temp: 98.8 °F (37.1 °C)   SpO2: 98%                Haydee Lyn M.D,  Attending Emergency  Physician            Haydee Lyn MD  02/07/25 7228

## 2025-02-07 NOTE — TELEPHONE ENCOUNTER
Pt called into office to schedule a hospital follow-up.    Pt requested to be seen on 02/11 but writer explained to patient that her provider's schedule is fully booked for that day and was offered 02/13 but pt  said its too far out and insisted she needs to be seen sooner because she is been to the ER twice now and don't want to go back again.     Please advise  Thanks

## 2025-02-10 NOTE — TELEPHONE ENCOUNTER
Pt called into office to correct her dates on the FMLA form that was sent to the office.  She wants it to say she is off from 2/3-2/13 only and not a whole month.

## 2025-02-11 NOTE — TELEPHONE ENCOUNTER
Patient stopped into office stating she spoke with [employer] and was informed that the [place] paperwork was not complete. Writer spoke with MA- she spoke with patient and advised of what was still needing filled out.

## 2025-02-14 ENCOUNTER — CARE COORDINATION (OUTPATIENT)
Dept: OTHER | Facility: CLINIC | Age: 70
End: 2025-02-14

## 2025-02-14 NOTE — CARE COORDINATION
Future Appointments         Provider Specialty Dept Phone    2/26/2025 2:00 PM Angelo Holbrook MD Gastroenterology [phone]            Patient has agreed to contact primary care provider and/or specialist for any further questions, concerns, or needs.    Mel Rm RN

## 2025-04-02 ENCOUNTER — OFFICE VISIT (OUTPATIENT)
Dept: FAMILY MEDICINE CLINIC | Age: 70
End: 2025-04-02
Payer: COMMERCIAL

## 2025-04-02 VITALS
HEART RATE: 61 BPM | DIASTOLIC BLOOD PRESSURE: 54 MMHG | SYSTOLIC BLOOD PRESSURE: 152 MMHG | OXYGEN SATURATION: 100 % | TEMPERATURE: 97.2 F

## 2025-04-02 DIAGNOSIS — M54.50 CHRONIC LEFT-SIDED LOW BACK PAIN WITHOUT SCIATICA: Primary | ICD-10-CM

## 2025-04-02 DIAGNOSIS — G89.29 CHRONIC LEFT-SIDED LOW BACK PAIN WITHOUT SCIATICA: Primary | ICD-10-CM

## 2025-04-02 PROCEDURE — 1123F ACP DISCUSS/DSCN MKR DOCD: CPT

## 2025-04-02 PROCEDURE — 99213 OFFICE O/P EST LOW 20 MIN: CPT

## 2025-04-02 RX ORDER — LIDOCAINE 50 MG/G
1 PATCH TOPICAL DAILY
Qty: 10 PATCH | Refills: 0 | Status: SHIPPED | OUTPATIENT
Start: 2025-04-02 | End: 2025-04-12

## 2025-04-02 RX ORDER — METHYLPREDNISOLONE 4 MG/1
TABLET ORAL
Qty: 1 KIT | Refills: 0 | Status: SHIPPED | OUTPATIENT
Start: 2025-04-02 | End: 2025-04-08

## 2025-04-02 RX ORDER — TIZANIDINE 2 MG/1
2 TABLET ORAL 3 TIMES DAILY PRN
Qty: 30 TABLET | Refills: 0 | Status: SHIPPED | OUTPATIENT
Start: 2025-04-02 | End: 2025-04-12

## 2025-04-02 ASSESSMENT — ENCOUNTER SYMPTOMS
EYE PAIN: 0
BOWEL INCONTINENCE: 0
EYE REDNESS: 0
BACK PAIN: 1
EYE ITCHING: 0
COLOR CHANGE: 0

## 2025-04-02 NOTE — PROGRESS NOTES
[facility] PHYSICIANS [facility], [facility] WALK-IN FAMILY MEDICINE  [address]  [address]  [facility] [address]  Dept: [phone]  Dept Fax: [phone]    Shaila Olea is a 69 y.o. female who presents to the urgent care today for her medical conditions/complaints as notedbelow.  Shaila Olea is c/o of Back Pain (Onset back pain for months. Otc ibuprofen)      HPI:     Patient presents to the Walk In Clinic for evaluation of chronic back pain, onset 2 to 3 months, NKI.     Patient Care Team:  Jesse Kiran DO as PCP - General (Family Medicine)  Jesse Kiran DO as PCP - Empaneled Provider  Ermelinda Vivas MD as Consulting Physician (Infectious Diseases)      Back Pain  This is a chronic problem. The current episode started more than 1 month ago (x 2 to 3 months). The problem occurs constantly. The problem has been gradually worsening since onset. The pain is present in the lumbar spine and gluteal. Quality: sharp while getting up and down. The pain does not radiate. The pain is moderate. The pain is The same all the time. The symptoms are aggravated by standing. Pertinent negatives include no bladder incontinence, bowel incontinence, chest pain, dysuria, fever, headaches, leg pain, numbness, paresis, paresthesias, pelvic pain, perianal numbness, tingling, weakness or weight loss. She has tried NSAIDs for the symptoms. The treatment provided no relief.       Past Medical History:   Diagnosis Date    COVID-19 vaccine series completed     Depression 07/2023    mother passed away in June /  asked for divorce    Diverticulitis     with GI bleeding, states was seen at [facility] , they wanted to transfuse her but she left    Diverticulosis 2009    Dysphagia     GERD (gastroesophageal reflux disease)     Glaucoma 2011    H/O mitral valve prolapse     diagnoses at age 8 ; Saw Cardiology 5/12/2023 for clearance for dental work, had echo 6/2/2023    Herpes

## 2025-04-10 ENCOUNTER — OFFICE VISIT (OUTPATIENT)
Dept: ORTHOPEDIC SURGERY | Age: 70
End: 2025-04-10
Payer: COMMERCIAL

## 2025-04-10 VITALS — HEIGHT: 64 IN | BODY MASS INDEX: 29.37 KG/M2 | RESPIRATION RATE: 14 BRPM | WEIGHT: 172 LBS

## 2025-04-10 DIAGNOSIS — M43.10 ACQUIRED SPONDYLOLISTHESIS: ICD-10-CM

## 2025-04-10 DIAGNOSIS — M54.50 ACUTE LEFT-SIDED LOW BACK PAIN WITHOUT SCIATICA: Primary | ICD-10-CM

## 2025-04-10 PROCEDURE — 99203 OFFICE O/P NEW LOW 30 MIN: CPT | Performed by: ORTHOPAEDIC SURGERY

## 2025-04-10 PROCEDURE — 1123F ACP DISCUSS/DSCN MKR DOCD: CPT | Performed by: ORTHOPAEDIC SURGERY
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
## 2025-07-08 ENCOUNTER — APPOINTMENT (OUTPATIENT)
Dept: GENERAL RADIOLOGY | Age: 70
End: 2025-07-08
Payer: COMMERCIAL

## 2025-07-08 ENCOUNTER — HOSPITAL ENCOUNTER (EMERGENCY)
Age: 70
Discharge: HOME | End: 2025-07-09
Attending: EMERGENCY MEDICINE
Payer: COMMERCIAL

## 2025-07-08 VITALS
TEMPERATURE: 98.6 F | RESPIRATION RATE: 16 BRPM | SYSTOLIC BLOOD PRESSURE: 160 MMHG | OXYGEN SATURATION: 100 % | DIASTOLIC BLOOD PRESSURE: 72 MMHG | HEART RATE: 57 BPM

## 2025-07-08 DIAGNOSIS — S70.11XA HEMATOMA OF RIGHT THIGH, INITIAL ENCOUNTER: Primary | ICD-10-CM

## 2025-07-08 DIAGNOSIS — S89.92XA INJURY OF LEFT SHIN, INITIAL ENCOUNTER: ICD-10-CM

## 2025-07-08 PROCEDURE — 99283 EMERGENCY DEPT VISIT LOW MDM: CPT

## 2025-07-08 PROCEDURE — 73590 X-RAY EXAM OF LOWER LEG: CPT

## 2025-07-08 ASSESSMENT — PAIN DESCRIPTION - ORIENTATION: ORIENTATION: LEFT

## 2025-07-08 ASSESSMENT — PAIN - FUNCTIONAL ASSESSMENT: PAIN_FUNCTIONAL_ASSESSMENT: 0-10

## 2025-07-08 ASSESSMENT — PAIN DESCRIPTION - LOCATION: LOCATION: OTHER (COMMENT)

## 2025-07-08 ASSESSMENT — PAIN SCALES - GENERAL: PAINLEVEL_OUTOF10: 7

## 2025-07-09 NOTE — ED PROVIDER NOTES
[facility] EMERGENCY DEPARTMENT  Emergency Department  Emergency Medicine Resident Turn-Over     Note Started: 1:42 AM EDT    Care of Shaila Olea was assumed from Dr. Grace and is being seen for Fall  .  The patient's initial evaluation and plan have been discussed with the prior provider who initially evaluated the patient.     EMERGENCY DEPARTMENT COURSE / MEDICAL DECISION MAKING:       MEDICATIONS GIVEN:  No orders of the defined types were placed in this encounter.      LABS / RADIOLOGY:     Labs Reviewed - No data to display    No results found.    RECENT VITALS:     Temp: 98.6 °F (37 °C),  Pulse: 57, Respirations: 16, BP: (!) 160/72, SpO2: 100 %    This patient is a 69 y.o. Female with trip and fall 1 week ago, hematoma to right thigh that is approving, left shin contusion.     ED Course as of 07/09/25 0200   Wed Jul 09, 2025   0143 Care of patient signed out to overnight resident pending x-ray of tib-fib [JF]   0151 XR TIBIA FIBULA LEFT (2 VIEWS)  IMPRESSION:  Mild edema or cellulitis in the soft tissues throughout the lower leg with no  acute bony abnormality.   [TF]      ED Course User Index  [JF] Melissa Grace DO  [TF] Brian Clements MD   Follow up XRAY and discharge    X-ray without any acute fracture or dislocation just noted for soft tissue swelling    OUTSTANDING TASKS / RECOMMENDATIONS:    Follow up x-ray  Likely discharge     FINAL IMPRESSION:     1. Hematoma of right thigh, initial encounter    2. Injury of left shin, initial encounter        DISPOSITION:         DISPOSITION:  [x]  Discharge   []  Transfer -    []  Admission -     []  Against Medical Advice   []  Eloped   FOLLOW-UP: Jesse Kiran DO  [address] [id number]  [phone]    Schedule an appointment as soon as possible for a visit   As needed     DISCHARGE MEDICATIONS: New Prescriptions    No medications on file           Brian Clements MD  Emergency Medicine Resident  [facility]

## 2025-07-09 NOTE — ED PROVIDER NOTES
[facility] EMERGENCY DEPARTMENT  Emergency Department Encounter  Emergency Medicine Resident     Pt Name:Shaila Olea  MRN: [id number]  Birthdate 1955  Date of evaluation: 25  PCP:  Jesse Kiran DO  Note Started: 11:03 PM EDT      CHIEF COMPLAINT       Chief Complaint   Patient presents with    Fall       HISTORY OF PRESENT ILLNESS  (Location/Symptom, Timing/Onset, Context/Setting, Quality, Duration, Modifying Factors, Severity.)      Shaila Olea is a 69 y.o. female who presents with right thigh and left shin pain following a fall that occurred about a week ago.  Patient reports that she was walking up her steps when she lost her footing and tripped, hitting her shins on the concrete step and landing on her right side.  She has been taking Tylenol Motrin at home without much improvement in pain.  She notes a large knot to her right thigh.  She is concerned about a blood clot as a family member recently  of one.  No fever, chills, nausea, vomiting, abdominal pain, chest pain, shortness of breath.    PAST MEDICAL / SURGICAL / SOCIAL / FAMILY HISTORY      has a past medical history of COVID-19 vaccine series completed, Depression, Diverticulitis, Diverticulosis, Dysphagia, GERD (gastroesophageal reflux disease), Glaucoma, H/O mitral valve prolapse, Herpes, Hiatal hernia, IBS (irritable bowel syndrome), Insulin resistance, Lumbar stenosis, MVP (mitral valve prolapse), Right elbow tendonitis, Sacroiliitis, Sarcoidosis, Sleep disturbance, Snores, Under care of team, Under care of team, Vitamin D deficiency, Wears glasses, and Wellness examination.     has a past surgical history that includes Tonsillectomy and adenoidectomy (); Ovary surgery (Left); Colonoscopy; lymph node biopsy (Right, 2010); Total abdominal hysterectomy w/ bilateral salpingoophorectomy (2009); Foot neuroma surgery (Left); pelvic laparoscopy; Esophagogastroduodenoscopy (04/10/2013);  including chest pain and shortness of breath, worsening swelling in 1 leg over the other.  She verbalized understanding.  Will obtain imaging of left tibia for her comfort but ultimately plan for discharge    Amount and/or Complexity of Data Reviewed  Radiology: ordered. Decision-making details documented in ED Course.      EMERGENCY DEPARTMENT COURSE:    ED Course as of 07/09/25 0238   Wed Jul 09, 2025   0143 Care of patient signed out to overnight resident pending x-ray of tib-fib [JF]   0151 XR TIBIA FIBULA LEFT (2 VIEWS)  IMPRESSION:  Mild edema or cellulitis in the soft tissues throughout the lower leg with no  acute bony abnormality.   [TF]      ED Course User Index  [JF] Melissa Barboza DO  [TF] Brian Clements MD       PROCEDURES:    CONSULTS:  None    CRITICAL CARE:  There was significant risk of life threatening deterioration of patient's condition requiring my direct management. Critical care time 0 minutes, excluding any documented procedures.    FINAL IMPRESSION      1. Hematoma of right thigh, initial encounter    2. Injury of left shin, initial encounter          DISPOSITION / PLAN     DISPOSITION Decision To Discharge 07/09/2025 01:55:52 AM   DISPOSITION CONDITION Stable           PATIENT REFERRED TO:  Jesse Kiran DO  [address] [address]  [phone]    Schedule an appointment as soon as possible for a visit   As needed      DISCHARGE MEDICATIONS:  Discharge Medication List as of 7/9/2025  1:55 AM          Melissa Barboza DO  Emergency Medicine Resident    (Please note that portions of thisnote were completed with a voice recognition program.  Efforts were made to edit the dictations but occasionally words are mis-transcribed.)

## 2025-07-09 NOTE — ED TRIAGE NOTES
Fall at home 1 week ago   Pain 7/10   R thigh   L lower leg   Bruise like   \"Hard knot\"  Hx multiple surgeries, hx torn ligaments- some weakness on left hand

## 2025-07-09 NOTE — ED PROVIDER NOTES
[facility]     Emergency Department     Faculty Note/ Attestation      Pt Name: Shaila Olea                                       MRN: [id number]  Birthdate 1955  Date of evaluation: 7/8/2025    Patients PCP:    Jesse Kiran DO    Note Started: 11:16 PM EDT      Attestation  I performed a history and physical examination of the patient and discussed management with the resident. I reviewed the resident’s note and agree with the documented findings and plan of care. Any areas of disagreement are noted on the chart. I was personally present for the key portions of any procedures. I have documented in the chart those procedures where I was not present during the key portions. I have reviewed the emergency nurses triage note. I agree with the chief complaint, past medical history, past surgical history, allergies, medications, social and family history as documented unless otherwise noted below.    For Physician Assistant/ Nurse Practitioner cases/documentation I have personally evaluated this patient and have completed at least one if not all key elements of the E/M (history, physical exam, and MDM). Additional findings are as noted.      Initial Screens:        [place] Coma Scale  Eye Opening: Spontaneous  Best Verbal Response: Oriented  Best Motor Response: Obeys commands  [place] Coma Scale Score: 15    Vitals:    Vitals:    07/08/25 2107 07/08/25 2251   BP: 128/78 (!) 169/86   Pulse: 68 57   Resp: 18 16   Temp: 98.6 °F (37 °C)    SpO2: 100% 100%       CHIEF COMPLAINT       Chief Complaint   Patient presents with    Fall             DIAGNOSTIC RESULTS             RADIOLOGY:   No orders to display         LABS:  Labs Reviewed - No data to display      EMERGENCY DEPARTMENT COURSE:     -------------------------  BP: (!) 169/86, Temp: 98.6 °F (37 °C), Pulse: 57, Respirations: 16      Comments    Trip 1 week ago, fall, mechanical  Struck shins and R thigh  Bruising to lateral

## 2025-07-09 NOTE — DISCHARGE INSTRUCTIONS
You were seen in the ER today for right thigh and the left shin pain following a fall.  Right thigh is a contusion/hematoma, recommend warm compresses, elevation, compression like with an Ace wrap.  As for your left shin, this is also a contusion/bruise.  Recommend ice, elevation.  Continue taking Tylenol, Motrin at home for pain.  Low suspicion for blood clot.  You need to follow-up with your doctor for reevaluation.  Return to the ER if shortness of breath, chest pain, significant swelling in one leg compared to the other, or any other concerns.

## 2025-07-10 ENCOUNTER — CARE COORDINATION (OUTPATIENT)
Dept: OTHER | Facility: CLINIC | Age: 70
End: 2025-07-10

## 2025-07-10 NOTE — CARE COORDINATION
Ambulatory Care Coordination Note     7/10/2025 11:40 AM     Patient outreach attempt by this ACM today to offer care management services. ACM was unable to reach the patient by telephone today;   voicemail full and unable to leave a message.      ACM: Gladys Choi LPN     Care Summary Note: Patient was seen in the ER due to complaints of a fall 1 week prior with continued pain to both legs. Work up in the Er with xrays did not show any fx or dislocation. Patient dx with contusion and hematoma to right thigh and contusion to left shin. Advised RICE and OTC medications. She was instructed to follow up with PCP for continued pain.     PCP/Specialist follow up:       Follow Up:   Plan for next ACM outreach in approximately 1-2 days  to complete:  second- outreach attempt to offer care management services.

## 2025-07-11 ENCOUNTER — CARE COORDINATION (OUTPATIENT)
Dept: OTHER | Facility: CLINIC | Age: 70
End: 2025-07-11

## 2025-07-11 NOTE — CARE COORDINATION
Ambulatory Care Coordination Note     2025 1:25 PM     Patient Current Location:  [place]     This patient was received as a referral from Population health report .    ACM contacted the patient by telephone. Verified name and  with patient as identifiers. Provided introduction to self, and explanation of the ACM role.   Patient declined care management services at this time.          ACM: Gladys Choi LPN     Challenges to be reviewed by the provider   Additional needs identified to be addressed with provider No                 Method of communication with provider: none.    Utilization: Initial Call - N/A    Care Summary Note:  Patient was seen in the ER due to complaints of a fall 1 week prior with continued pain to both legs. Work up in the Er with xrays did not show any fx or dislocation. Patient dx with contusion and hematoma to right thigh and contusion to left shin. Advised RICE and OTC medications. She was instructed to follow up with PCP for continued pain.     ACM spoke with patient who reports she is doing much better. States pain is improved and she is able to ambulate and stand without difficulty. She denies and concerns or questions and does not identify any ACM needs, and politely declined ACM services.       PCP/Specialist follow up: Patient to call for follow up as needed.      Follow Up:   No further Ambulatory Care Management follow-up scheduled at this time.  Patient  has Ambulatory Care Manager's contact information for any further questions, concerns or needs.

## 2025-07-23 ENCOUNTER — OFFICE VISIT (OUTPATIENT)
Dept: ORTHOPEDIC SURGERY | Age: 70
End: 2025-07-23
Payer: COMMERCIAL

## 2025-07-23 VITALS — RESPIRATION RATE: 14 BRPM | WEIGHT: 173 LBS | HEIGHT: 64 IN | BODY MASS INDEX: 29.53 KG/M2

## 2025-07-23 DIAGNOSIS — M79.605 LEFT LEG PAIN: Primary | ICD-10-CM

## 2025-07-23 PROCEDURE — 99203 OFFICE O/P NEW LOW 30 MIN: CPT | Performed by: ORTHOPAEDIC SURGERY

## 2025-07-23 PROCEDURE — 1123F ACP DISCUSS/DSCN MKR DOCD: CPT | Performed by: ORTHOPAEDIC SURGERY

## 2025-07-23 RX ORDER — DICLOFENAC SODIUM 75 MG/1
75 TABLET, DELAYED RELEASE ORAL 2 TIMES DAILY WITH MEALS
Qty: 28 TABLET | Refills: 0 | Status: SHIPPED | OUTPATIENT
Start: 2025-07-23 | End: 2025-08-06

## 2025-07-23 NOTE — PROGRESS NOTES
ORTHOPEDIC PATIENT EVALUATION      HPI / Chief Complaint  Shaila Olea is a 69 y.o. female who presents for evaluation of her left leg.  On 7/1/2025 she states that she was carrying some items up into her home and attempted to take a step up to the front door of her home when she tripped and fell striking her left leg against the edge of the step.  She had immediate pain but did not go to the emergency department until 7/8/2025 as a result of pain and swelling.  X-rays were obtained at the time and were negative.  She states that the swelling has since shown some improvement but she still has pain and residual swelling and so presents today for further evaluation and treatment.  Her pain is primarily localized to the anterior aspect of her lower leg along her shin.  No associated numbness or tingling.    Past Medical History  Shaila  has a past medical history of COVID-19 vaccine series completed, Depression, Diverticulitis, Diverticulosis, Dysphagia, GERD (gastroesophageal reflux disease), Glaucoma, H/O mitral valve prolapse, Herpes, Hiatal hernia, IBS (irritable bowel syndrome), Insulin resistance, Lumbar stenosis, MVP (mitral valve prolapse), Right elbow tendonitis, Sacroiliitis, Sarcoidosis, Sleep disturbance, Snores, Under care of team, Under care of team, Vitamin D deficiency, Wears glasses, and Wellness examination.    Past Surgical History  Shaila  has a past surgical history that includes Tonsillectomy and adenoidectomy (1973); Ovary surgery (Left); Colonoscopy; lymph node biopsy (Right, 11/17/2010); Total abdominal hysterectomy w/ bilateral salpingoophorectomy (04/02/2009); Foot neuroma surgery (Left); pelvic laparoscopy; Esophagogastroduodenoscopy (04/10/2013); Esophagogastroduodenoscopy (04/08/2008); Esophagogastroduodenoscopy (02/06/2014); Foot surgery (Right, 08/29/2017); Foot surgery (Right, 06/19/2018); Colonoscopy (09/01/2023); Esophagogastroduodenoscopy (09/01/2023); Colonoscopy (N/A,

## (undated) DEVICE — ELECTRODE PT RET AD L9FT HI MOIST COND ADH HYDRGEL CORDED

## (undated) DEVICE — FORCEPS BX L240CM WRK CHN 2.8MM STD CAP W/ NDL MIC MESH

## (undated) DEVICE — DEFENDO AIR WATER SUCTION AND BIOPSY VALVE KIT FOR  OLYMPUS: Brand: DEFENDO AIR/WATER/SUCTION AND BIOPSY VALVE

## (undated) DEVICE — ENDO KIT W/SYRINGE: Brand: MEDLINE INDUSTRIES, INC.

## (undated) DEVICE — ESOPHAGEAL BALLOON DILATATION CATHETER: Brand: CRE FIXED WIRE

## (undated) DEVICE — SNARE ENDOSCP M W27MMXL240CM SHTH DIA2.4MM OVL FLX DISP

## (undated) DEVICE — SINGLE-USE BIOPSY FORCEPS: Brand: RADIAL JAW 4

## (undated) DEVICE — TRAP SPEC RETRV CLR PLAS POLYP IN LN SUCT QUIK CTCH

## (undated) DEVICE — SYRINGE INFL 60ML DISP ALLIANCE II

## (undated) DEVICE — BITEBLOCK 54FR W/ DENT RIM BLOX